# Patient Record
Sex: MALE | Race: WHITE | NOT HISPANIC OR LATINO | Employment: OTHER | URBAN - METROPOLITAN AREA
[De-identification: names, ages, dates, MRNs, and addresses within clinical notes are randomized per-mention and may not be internally consistent; named-entity substitution may affect disease eponyms.]

---

## 2017-03-21 RX ORDER — CARVEDILOL 3.12 MG/1
TABLET ORAL
Qty: 45 TABLET | Refills: 11 | Status: SHIPPED | OUTPATIENT
Start: 2017-03-21 | End: 2018-05-07 | Stop reason: SDUPTHER

## 2017-04-20 RX ORDER — HYDROCORTISONE ACETATE 25 MG/1
SUPPOSITORY RECTAL
Qty: 20 SUPPOSITORY | Refills: 0 | Status: SHIPPED | OUTPATIENT
Start: 2017-04-20 | End: 2017-10-19

## 2017-05-22 RX ORDER — AMLODIPINE BESYLATE 5 MG/1
TABLET ORAL
Qty: 90 TABLET | Refills: 0 | Status: SHIPPED | OUTPATIENT
Start: 2017-05-22 | End: 2017-08-20 | Stop reason: SDUPTHER

## 2017-08-20 DIAGNOSIS — E78.5 HLD (HYPERLIPIDEMIA): ICD-10-CM

## 2017-08-20 RX ORDER — ATORVASTATIN CALCIUM 10 MG/1
TABLET, FILM COATED ORAL
Qty: 45 TABLET | Refills: 3 | Status: SHIPPED | OUTPATIENT
Start: 2017-08-20 | End: 2018-02-28 | Stop reason: SDUPTHER

## 2017-08-22 ENCOUNTER — OFFICE VISIT (OUTPATIENT)
Dept: FAMILY MEDICINE | Facility: CLINIC | Age: 77
End: 2017-08-22
Payer: MEDICARE

## 2017-08-22 VITALS
SYSTOLIC BLOOD PRESSURE: 130 MMHG | RESPIRATION RATE: 18 BRPM | HEIGHT: 69 IN | WEIGHT: 182.75 LBS | TEMPERATURE: 98 F | HEART RATE: 54 BPM | BODY MASS INDEX: 27.07 KG/M2 | DIASTOLIC BLOOD PRESSURE: 72 MMHG | OXYGEN SATURATION: 95 %

## 2017-08-22 DIAGNOSIS — Z12.5 PROSTATE CANCER SCREENING: ICD-10-CM

## 2017-08-22 DIAGNOSIS — Z00.00 PREVENTATIVE HEALTH CARE: ICD-10-CM

## 2017-08-22 DIAGNOSIS — K21.9 GASTROESOPHAGEAL REFLUX DISEASE, ESOPHAGITIS PRESENCE NOT SPECIFIED: ICD-10-CM

## 2017-08-22 DIAGNOSIS — I10 ESSENTIAL HYPERTENSION: Chronic | ICD-10-CM

## 2017-08-22 DIAGNOSIS — E78.5 HYPERLIPIDEMIA, UNSPECIFIED HYPERLIPIDEMIA TYPE: Primary | ICD-10-CM

## 2017-08-22 DIAGNOSIS — I25.810 CORONARY ARTERY DISEASE INVOLVING CORONARY BYPASS GRAFT OF NATIVE HEART WITHOUT ANGINA PECTORIS: Chronic | ICD-10-CM

## 2017-08-22 DIAGNOSIS — S86.811A STRAIN OF CALF MUSCLE, RIGHT, INITIAL ENCOUNTER: ICD-10-CM

## 2017-08-22 PROCEDURE — G0009 ADMIN PNEUMOCOCCAL VACCINE: HCPCS | Mod: PBBFAC,PO

## 2017-08-22 PROCEDURE — 99213 OFFICE O/P EST LOW 20 MIN: CPT | Mod: PBBFAC,PO | Performed by: FAMILY MEDICINE

## 2017-08-22 PROCEDURE — 1159F MED LIST DOCD IN RCRD: CPT | Mod: ,,, | Performed by: FAMILY MEDICINE

## 2017-08-22 PROCEDURE — 3075F SYST BP GE 130 - 139MM HG: CPT | Mod: ,,, | Performed by: FAMILY MEDICINE

## 2017-08-22 PROCEDURE — 99214 OFFICE O/P EST MOD 30 MIN: CPT | Mod: S$PBB,,, | Performed by: FAMILY MEDICINE

## 2017-08-22 PROCEDURE — 90732 PPSV23 VACC 2 YRS+ SUBQ/IM: CPT | Mod: PBBFAC,PO

## 2017-08-22 PROCEDURE — 1126F AMNT PAIN NOTED NONE PRSNT: CPT | Mod: ,,, | Performed by: FAMILY MEDICINE

## 2017-08-22 PROCEDURE — 99999 PR PBB SHADOW E&M-EST. PATIENT-LVL III: CPT | Mod: PBBFAC,,, | Performed by: FAMILY MEDICINE

## 2017-08-22 PROCEDURE — 3078F DIAST BP <80 MM HG: CPT | Mod: ,,, | Performed by: FAMILY MEDICINE

## 2017-08-22 RX ORDER — ATORVASTATIN CALCIUM 10 MG/1
TABLET, FILM COATED ORAL
Qty: 45 TABLET | Refills: 3 | Status: CANCELLED | OUTPATIENT
Start: 2017-08-22

## 2017-08-22 RX ORDER — EZETIMIBE 10 MG/1
10 TABLET ORAL DAILY
Qty: 90 TABLET | Refills: 3 | Status: CANCELLED | OUTPATIENT
Start: 2017-08-22

## 2017-08-22 RX ORDER — AMLODIPINE BESYLATE 5 MG/1
TABLET ORAL
Qty: 90 TABLET | Refills: 4 | Status: SHIPPED | OUTPATIENT
Start: 2017-08-22 | End: 2018-08-27 | Stop reason: SDUPTHER

## 2017-08-22 RX ORDER — AMLODIPINE BESYLATE 5 MG/1
TABLET ORAL
Qty: 90 TABLET | Refills: 4 | Status: CANCELLED | OUTPATIENT
Start: 2017-08-22

## 2017-08-22 NOTE — PROGRESS NOTES
Subjective:       Patient ID: Sean Main is a 76 y.o. male.    Chief Complaint: right leg pain    Here for annual exam.  CAD s/p CABG - following with cardiology  HTN - tolerating amlodipine 5mg daily and Coreg 3.125mg 1/2 BID  HLD - tolerating Zetia 10, Lipitor 10mg 1/2 tablet daily  GERD/hiatal hernia - Protonix 40mg PRN    C/o some right lateral calf pain and spasms for the past 2 months. Stretching has helped some. No noted injury.    Past Medical History:    CAD (coronary artery disease)                                 GERD (gastroesophageal reflux disease)                        Hernia of unspecified site of abdominal cavity*                 Comment:hiatal hernia    Diverticulosis                                                HTN (hypertension)                                            HLD (hyperlipidemia)                                          Past Surgical History:    CORONARY ARTERY BYPASS GRAFT                     4/4/2012        Comment:6 vessels    UPPER GASTROINTESTINAL ENDOSCOPY                 2011            Comment:hiatal hernia    COLONOSCOPY                                      2008            Comment:diverticulosis    CHOLECYSTECTOMY                                                LIVER BIOPSY                                                   Allergies:   -- No Known Drug Allergies    -- Pravastatin     --  myalgias    Social History    Marital Status:                         Social History Main Topics    Smoking Status: Never Smoker                      Smokeless Status: Never Used                        Alcohol Use: No              Drug Use: No                Current Outpatient Prescriptions on File Prior to Visit:  acetaminophen (TYLENOL) 325 MG tablet, Take 325 mg by mouth every 6 (six) hours as needed.  , Disp: , Rfl:   amlodipine (NORVASC) 5 MG tablet, Take 1 tablet (5 mg total) by mouth every evening., Disp: 90 tablet, Rfl: 6  aspirin (ECOTRIN) 81 MG EC tablet, Take 81  mg by mouth once daily., Disp: , Rfl:   atorvastatin (LIPITOR) 10 MG tablet, Take 0.5 tablets (5 mg total) by mouth once daily., Disp: 90 tablet, Rfl: 6  carvedilol (COREG) 3.125 MG tablet, Take 1 tablet (3.125 mg total) by mouth 2 (two) times daily., Disp: 180 tablet, Rfl: 5  ezetimibe (ZETIA) 10 mg tablet, Take 1 tablet (10 mg total) by mouth once daily., Disp: 30 tablet, Rfl: 12  meclizine (ANTIVERT) 25 mg tablet, Take 1 tablet (25 mg total) by mouth 3 (three) times daily as needed., Disp: 30 tablet, Rfl: 5  multivitamin,tx-iron-minerals (THERAPEUTIC MULTIVITAMIN-MINERALS) Tab, Take 1 tablet by mouth once daily., Disp: , Rfl:   niacin 50 MG Tab, Take 100 mg by mouth every evening., Disp: , Rfl:   pantoprazole (PROTONIX) 40 MG tablet, TAKE ONE TABLET BY MOUTH EVERY DAY, Disp: 30 tablet, Rfl: 11  saw palmetto 500 MG capsule, Take 500 mg by mouth 2 (two) times daily., Disp: , Rfl:   hydrocortisone (ANUSOL-HC) 25 mg suppository, Place 1 suppository (25 mg total) rectally 2 (two) times daily., Disp: 20 suppository, Rfl: 5    No current facility-administered medications on file prior to visit.        Review of patient's family history indicates:    Cirrhosis                      Neg Hx                    Liver cancer                   Neg Hx                    Liver disease                  Neg Hx                    Colon cancer                   Neg Hx                        Hypertension   Pertinent negatives include no chest pain, headaches, neck pain, palpitations or shortness of breath.     Review of Systems   Constitutional: Negative for appetite change, fatigue, fever and unexpected weight change.   HENT: Negative for congestion, ear discharge, ear pain, nosebleeds, postnasal drip, rhinorrhea, sinus pressure, sore throat and trouble swallowing.    Eyes: Negative for pain, discharge, redness and visual disturbance.   Respiratory: Negative for apnea, cough, chest tightness, shortness of breath and wheezing.   "  Cardiovascular: Negative for chest pain, palpitations and leg swelling.   Gastrointestinal: Negative for abdominal distention, abdominal pain, blood in stool, constipation, diarrhea, nausea and vomiting.   Genitourinary: Negative for difficulty urinating, dysuria, flank pain, frequency, hematuria, penile swelling (genital pain or swelling), scrotal swelling, testicular pain and urgency.   Musculoskeletal: Negative for back pain, gait problem, joint swelling, myalgias and neck pain.   Skin: Negative for color change, rash and wound.   Neurological: Negative for dizziness, tremors, seizures, syncope, speech difficulty, weakness, light-headedness, numbness and headaches.   Hematological: Negative for adenopathy. Does not bruise/bleed easily.   Psychiatric/Behavioral: Negative for behavioral problems, confusion, hallucinations and suicidal ideas.       Objective:       /72 (BP Location: Left arm, Patient Position: Sitting, BP Method: Medium (Manual))   Pulse (!) 54   Temp 98.3 °F (36.8 °C)   Resp 18   Ht 5' 9" (1.753 m)   Wt 82.9 kg (182 lb 12.2 oz)   SpO2 95%   BMI 26.99 kg/m²     Physical Exam   Constitutional: He is oriented to person, place, and time. He appears well-developed and well-nourished. He is active. No distress.   HENT:   Head: Normocephalic and atraumatic.   Right Ear: External ear normal.   Left Ear: External ear normal.   Mouth/Throat: Uvula is midline, oropharynx is clear and moist and mucous membranes are normal. No oropharyngeal exudate.   Eyes: Conjunctivae, EOM and lids are normal. Pupils are equal, round, and reactive to light.   Neck: Normal range of motion, full passive range of motion without pain and phonation normal. Neck supple. No thyroid mass and no thyromegaly present.   Cardiovascular: Normal rate, regular rhythm, normal heart sounds and intact distal pulses.  Exam reveals no gallop and no friction rub.    No murmur heard.  Pulmonary/Chest: Effort normal and breath sounds " normal. No respiratory distress. He has no wheezes. He has no rales.   Abdominal: Soft. Bowel sounds are normal. He exhibits no distension and no mass. There is no tenderness. There is no rebound and no guarding.   Musculoskeletal: Normal range of motion.        Legs:  Lymphadenopathy:     He has no cervical adenopathy.   Neurological: He is alert and oriented to person, place, and time. No cranial nerve deficit. Coordination normal.   Skin: Skin is warm and dry.   Psychiatric: He has a normal mood and affect. His speech is normal and behavior is normal. Judgment and thought content normal.       Results for orders placed or performed in visit on 08/03/16   Lipid panel   Result Value Ref Range    Cholesterol 164 120 - 199 mg/dL    Triglycerides 201 (H) 30 - 150 mg/dL    HDL 48 40 - 75 mg/dL    LDL Cholesterol 75.8 63.0 - 159.0 mg/dL    HDL/Chol Ratio 29.3 20.0 - 50.0 %    Total Cholesterol/HDL Ratio 3.4 2.0 - 5.0    Non-HDL Cholesterol 116 mg/dL   Comprehensive metabolic panel   Result Value Ref Range    Sodium 139 136 - 145 mmol/L    Potassium 4.8 3.5 - 5.1 mmol/L    Chloride 106 95 - 110 mmol/L    CO2 24 23 - 29 mmol/L    Glucose 106 70 - 110 mg/dL    BUN, Bld 14 8 - 23 mg/dL    Creatinine 1.1 0.5 - 1.4 mg/dL    Calcium 9.8 8.7 - 10.5 mg/dL    Total Protein 7.3 6.0 - 8.4 g/dL    Albumin 4.0 3.5 - 5.2 g/dL    Total Bilirubin 0.9 0.1 - 1.0 mg/dL    Alkaline Phosphatase 94 55 - 135 U/L    AST 33 10 - 40 U/L    ALT 40 10 - 44 U/L    Anion Gap 9 8 - 16 mmol/L    eGFR if African American >60.0 >60 mL/min/1.73 m^2    eGFR if non African American >60.0 >60 mL/min/1.73 m^2   CK   Result Value Ref Range     20 - 200 U/L   TSH   Result Value Ref Range    TSH 1.241 0.400 - 4.000 uIU/mL   Comprehensive metabolic panel   Result Value Ref Range    Sodium 139 136 - 145 mmol/L    Potassium 4.8 3.5 - 5.1 mmol/L    Chloride 106 95 - 110 mmol/L    CO2 24 23 - 29 mmol/L    Glucose 106 70 - 110 mg/dL    BUN, Bld 14 8 - 23 mg/dL     Creatinine 1.1 0.5 - 1.4 mg/dL    Calcium 9.8 8.7 - 10.5 mg/dL    Total Protein 7.3 6.0 - 8.4 g/dL    Albumin 4.0 3.5 - 5.2 g/dL    Total Bilirubin 0.9 0.1 - 1.0 mg/dL    Alkaline Phosphatase 94 55 - 135 U/L    AST 33 10 - 40 U/L    ALT 40 10 - 44 U/L    Anion Gap 9 8 - 16 mmol/L    eGFR if African American >60.0 >60 mL/min/1.73 m^2    eGFR if non African American >60.0 >60 mL/min/1.73 m^2   Lipid panel   Result Value Ref Range    Cholesterol 164 120 - 199 mg/dL    Triglycerides 201 (H) 30 - 150 mg/dL    HDL 48 40 - 75 mg/dL    LDL Cholesterol 75.8 63.0 - 159.0 mg/dL    HDL/Chol Ratio 29.3 20.0 - 50.0 %    Total Cholesterol/HDL Ratio 3.4 2.0 - 5.0    Non-HDL Cholesterol 116 mg/dL   CBC auto differential   Result Value Ref Range    WBC 7.95 3.90 - 12.70 K/uL    RBC 5.46 4.60 - 6.20 M/uL    Hemoglobin 16.2 14.0 - 18.0 g/dL    Hematocrit 48.6 40.0 - 54.0 %    MCV 89 82 - 98 fL    MCH 29.7 27.0 - 31.0 pg    MCHC 33.3 32.0 - 36.0 %    RDW 12.7 11.5 - 14.5 %    Platelets 213 150 - 350 K/uL    MPV 11.2 9.2 - 12.9 fL    Gran # 5.2 1.8 - 7.7 K/uL    Lymph # 2.0 1.0 - 4.8 K/uL    Mono # 0.6 0.3 - 1.0 K/uL    Eos # 0.1 0.0 - 0.5 K/uL    Baso # 0.02 0.00 - 0.20 K/uL    Gran% 65.9 38.0 - 73.0 %    Lymph% 24.9 18.0 - 48.0 %    Mono% 7.4 4.0 - 15.0 %    Eosinophil% 1.4 0.0 - 8.0 %    Basophil% 0.3 0.0 - 1.9 %    Differential Method Automated      Assessment:       1. Hyperlipidemia, unspecified hyperlipidemia type    2. Prostate cancer screening    3. Preventative health care    4. Strain of calf muscle, right, initial encounter    5. Gastroesophageal reflux disease, esophagitis presence not specified    6. Essential hypertension    7. Coronary artery disease involving coronary bypass graft of native heart without angina pectoris        Plan:       Hyperlipidemia, unspecified hyperlipidemia type    Prostate cancer screening  -     PSA, Screening; Future; Expected date: 08/22/2017    Preventative health care  -     (In Office  Administered) Pneumococcal Polysaccharide Vaccine (23 Valent) (SQ/IM)    Strain of calf muscle, right, initial encounter    Gastroesophageal reflux disease, esophagitis presence not specified    Essential hypertension    Coronary artery disease involving coronary bypass graft of native heart without angina pectoris    Other orders  -     Cancel: ezetimibe (ZETIA) 10 mg tablet; Take 1 tablet (10 mg total) by mouth once daily.  Dispense: 90 tablet; Refill: 3  -     Cancel: amlodipine (NORVASC) 5 MG tablet;   Dispense: 90 tablet; Refill: 4  -     Cancel: atorvastatin (LIPITOR) 10 MG tablet;   Dispense: 45 tablet; Refill: 3          Eccentric right calf stretching discussed  Add PSA to upcoming cardiology labs  Counseled on regular exercise, maintenance of a healthy weight, balanced diet rich in fruits/vegetables and lean protein, and avoidance of unhealthy habits like smoking and excessive alcohol intake.  Continue present meds  F/u 1 year or as needed

## 2017-08-28 RX ORDER — EZETIMIBE 10 MG/1
TABLET ORAL
Qty: 30 TABLET | Refills: 11 | Status: SHIPPED | OUTPATIENT
Start: 2017-08-28 | End: 2018-09-03 | Stop reason: SDUPTHER

## 2017-09-28 ENCOUNTER — LAB VISIT (OUTPATIENT)
Dept: LAB | Facility: HOSPITAL | Age: 77
End: 2017-09-28
Attending: INTERNAL MEDICINE
Payer: MEDICARE

## 2017-09-28 DIAGNOSIS — E78.5 DYSLIPIDEMIA: Chronic | ICD-10-CM

## 2017-09-28 DIAGNOSIS — I10 ESSENTIAL HYPERTENSION: Chronic | ICD-10-CM

## 2017-09-28 DIAGNOSIS — Z95.1 S/P CABG (CORONARY ARTERY BYPASS GRAFT): ICD-10-CM

## 2017-09-28 DIAGNOSIS — Z12.5 PROSTATE CANCER SCREENING: ICD-10-CM

## 2017-09-28 DIAGNOSIS — R00.1 BRADYCARDIA: Chronic | ICD-10-CM

## 2017-09-28 DIAGNOSIS — I25.810 CORONARY ARTERY DISEASE INVOLVING CORONARY BYPASS GRAFT OF NATIVE HEART WITHOUT ANGINA PECTORIS: Chronic | ICD-10-CM

## 2017-09-28 DIAGNOSIS — R55 SYNCOPE AND COLLAPSE: ICD-10-CM

## 2017-09-28 DIAGNOSIS — I49.3 PVC (PREMATURE VENTRICULAR CONTRACTION): ICD-10-CM

## 2017-09-28 LAB
ALBUMIN SERPL BCP-MCNC: 3.9 G/DL
ALP SERPL-CCNC: 84 U/L
ALT SERPL W/O P-5'-P-CCNC: 33 U/L
ANION GAP SERPL CALC-SCNC: 7 MMOL/L
AST SERPL-CCNC: 34 U/L
BASOPHILS # BLD AUTO: 0.01 K/UL
BASOPHILS NFR BLD: 0.2 %
BILIRUB SERPL-MCNC: 0.7 MG/DL
BUN SERPL-MCNC: 17 MG/DL
CALCIUM SERPL-MCNC: 9.8 MG/DL
CHLORIDE SERPL-SCNC: 108 MMOL/L
CHOLEST SERPL-MCNC: 151 MG/DL
CHOLEST/HDLC SERPL: 3.3 {RATIO}
CO2 SERPL-SCNC: 29 MMOL/L
COMPLEXED PSA SERPL-MCNC: 1.4 NG/ML
CREAT SERPL-MCNC: 1.1 MG/DL
DIFFERENTIAL METHOD: NORMAL
EOSINOPHIL # BLD AUTO: 0.1 K/UL
EOSINOPHIL NFR BLD: 1.6 %
ERYTHROCYTE [DISTWIDTH] IN BLOOD BY AUTOMATED COUNT: 13 %
EST. GFR  (AFRICAN AMERICAN): >60 ML/MIN/1.73 M^2
EST. GFR  (NON AFRICAN AMERICAN): >60 ML/MIN/1.73 M^2
GLUCOSE SERPL-MCNC: 108 MG/DL
HCT VFR BLD AUTO: 49.1 %
HDLC SERPL-MCNC: 46 MG/DL
HDLC SERPL: 30.5 %
HGB BLD-MCNC: 16.3 G/DL
LDLC SERPL CALC-MCNC: 75 MG/DL
LYMPHOCYTES # BLD AUTO: 2 K/UL
LYMPHOCYTES NFR BLD: 35.7 %
MCH RBC QN AUTO: 30.1 PG
MCHC RBC AUTO-ENTMCNC: 33.2 G/DL
MCV RBC AUTO: 91 FL
MONOCYTES # BLD AUTO: 0.5 K/UL
MONOCYTES NFR BLD: 8.1 %
NEUTROPHILS # BLD AUTO: 3.1 K/UL
NEUTROPHILS NFR BLD: 54.2 %
NONHDLC SERPL-MCNC: 105 MG/DL
PLATELET # BLD AUTO: 166 K/UL
PMV BLD AUTO: 12.1 FL
POTASSIUM SERPL-SCNC: 5.5 MMOL/L
PROT SERPL-MCNC: 7.5 G/DL
RBC # BLD AUTO: 5.42 M/UL
SODIUM SERPL-SCNC: 144 MMOL/L
TRIGL SERPL-MCNC: 150 MG/DL
WBC # BLD AUTO: 5.68 K/UL

## 2017-09-28 PROCEDURE — 84153 ASSAY OF PSA TOTAL: CPT

## 2017-09-28 PROCEDURE — 80061 LIPID PANEL: CPT

## 2017-09-28 PROCEDURE — 80053 COMPREHEN METABOLIC PANEL: CPT

## 2017-09-28 PROCEDURE — 85025 COMPLETE CBC W/AUTO DIFF WBC: CPT

## 2017-09-28 PROCEDURE — 36415 COLL VENOUS BLD VENIPUNCTURE: CPT | Mod: PO

## 2017-09-29 ENCOUNTER — TELEPHONE (OUTPATIENT)
Dept: CARDIOLOGY | Facility: CLINIC | Age: 77
End: 2017-09-29

## 2017-09-29 DIAGNOSIS — I15.9 SECONDARY HYPERTENSION: ICD-10-CM

## 2017-09-29 DIAGNOSIS — I25.10 CORONARY ARTERY DISEASE, ANGINA PRESENCE UNSPECIFIED, UNSPECIFIED VESSEL OR LESION TYPE, UNSPECIFIED WHETHER NATIVE OR TRANSPLANTED HEART: Primary | ICD-10-CM

## 2017-09-29 NOTE — TELEPHONE ENCOUNTER
----- Message from Gregory Meyers MD sent at 9/28/2017  8:01 PM CDT -----  Please call the patient regarding his abnormal result. All labs are better except K repeat it next tues. No bananas

## 2017-09-29 NOTE — TELEPHONE ENCOUNTER
Spoke to patient, informed him of lab results and directions as per Dr. Meyers (abnormal labs, all labs are better except K repeat it next tues. No bananas). Schedule cmp labs for next Tuesday, pt. Accepted appointment and verbalized understanding.

## 2017-10-03 ENCOUNTER — LAB VISIT (OUTPATIENT)
Dept: LAB | Facility: HOSPITAL | Age: 77
End: 2017-10-03
Attending: INTERNAL MEDICINE
Payer: MEDICARE

## 2017-10-03 DIAGNOSIS — I15.9 SECONDARY HYPERTENSION: ICD-10-CM

## 2017-10-03 DIAGNOSIS — I25.10 CORONARY ARTERY DISEASE, ANGINA PRESENCE UNSPECIFIED, UNSPECIFIED VESSEL OR LESION TYPE, UNSPECIFIED WHETHER NATIVE OR TRANSPLANTED HEART: ICD-10-CM

## 2017-10-03 LAB
ALBUMIN SERPL BCP-MCNC: 4 G/DL
ALP SERPL-CCNC: 81 U/L
ALT SERPL W/O P-5'-P-CCNC: 30 U/L
ANION GAP SERPL CALC-SCNC: 7 MMOL/L
AST SERPL-CCNC: 30 U/L
BILIRUB SERPL-MCNC: 0.9 MG/DL
BUN SERPL-MCNC: 16 MG/DL
CALCIUM SERPL-MCNC: 9.7 MG/DL
CHLORIDE SERPL-SCNC: 107 MMOL/L
CO2 SERPL-SCNC: 27 MMOL/L
CREAT SERPL-MCNC: 1 MG/DL
EST. GFR  (AFRICAN AMERICAN): >60 ML/MIN/1.73 M^2
EST. GFR  (NON AFRICAN AMERICAN): >60 ML/MIN/1.73 M^2
GLUCOSE SERPL-MCNC: 101 MG/DL
POTASSIUM SERPL-SCNC: 4.9 MMOL/L
PROT SERPL-MCNC: 7.5 G/DL
SODIUM SERPL-SCNC: 141 MMOL/L

## 2017-10-03 PROCEDURE — 80053 COMPREHEN METABOLIC PANEL: CPT

## 2017-10-03 PROCEDURE — 36415 COLL VENOUS BLD VENIPUNCTURE: CPT | Mod: PO

## 2017-10-19 ENCOUNTER — TELEPHONE (OUTPATIENT)
Dept: PODIATRY | Facility: CLINIC | Age: 77
End: 2017-10-19

## 2017-10-19 ENCOUNTER — TELEPHONE (OUTPATIENT)
Dept: CARDIOLOGY | Facility: CLINIC | Age: 77
End: 2017-10-19

## 2017-10-19 ENCOUNTER — HOSPITAL ENCOUNTER (OUTPATIENT)
Dept: RADIOLOGY | Facility: HOSPITAL | Age: 77
Discharge: HOME | End: 2017-10-19
Attending: INTERNAL MEDICINE
Payer: MEDICARE

## 2017-10-19 ENCOUNTER — OFFICE VISIT (OUTPATIENT)
Dept: CARDIOLOGY | Facility: CLINIC | Age: 77
End: 2017-10-19
Payer: MEDICARE

## 2017-10-19 VITALS
SYSTOLIC BLOOD PRESSURE: 139 MMHG | DIASTOLIC BLOOD PRESSURE: 69 MMHG | HEIGHT: 69 IN | BODY MASS INDEX: 27.39 KG/M2 | HEART RATE: 58 BPM | WEIGHT: 184.94 LBS

## 2017-10-19 DIAGNOSIS — Z95.1 S/P CABG (CORONARY ARTERY BYPASS GRAFT): Primary | ICD-10-CM

## 2017-10-19 DIAGNOSIS — I10 ESSENTIAL HYPERTENSION: Chronic | ICD-10-CM

## 2017-10-19 DIAGNOSIS — Z95.1 S/P CABG (CORONARY ARTERY BYPASS GRAFT): ICD-10-CM

## 2017-10-19 DIAGNOSIS — I25.10 CORONARY ARTERY DISEASE INVOLVING NATIVE CORONARY ARTERY OF NATIVE HEART WITHOUT ANGINA PECTORIS: Chronic | ICD-10-CM

## 2017-10-19 DIAGNOSIS — I49.3 PVC (PREMATURE VENTRICULAR CONTRACTION): ICD-10-CM

## 2017-10-19 DIAGNOSIS — R55 SYNCOPE AND COLLAPSE: ICD-10-CM

## 2017-10-19 DIAGNOSIS — R00.1 BRADYCARDIA: Chronic | ICD-10-CM

## 2017-10-19 DIAGNOSIS — E78.5 DYSLIPIDEMIA: Chronic | ICD-10-CM

## 2017-10-19 PROCEDURE — 99999 PR PBB SHADOW E&M-EST. PATIENT-LVL IV: CPT | Mod: PBBFAC,,, | Performed by: INTERNAL MEDICINE

## 2017-10-19 PROCEDURE — 99214 OFFICE O/P EST MOD 30 MIN: CPT | Mod: S$PBB,,, | Performed by: INTERNAL MEDICINE

## 2017-10-19 PROCEDURE — 73630 X-RAY EXAM OF FOOT: CPT | Mod: TC,PO,LT

## 2017-10-19 PROCEDURE — 99214 OFFICE O/P EST MOD 30 MIN: CPT | Mod: PBBFAC,PO,25 | Performed by: INTERNAL MEDICINE

## 2017-10-19 PROCEDURE — 73630 X-RAY EXAM OF FOOT: CPT | Mod: 26,LT,, | Performed by: RADIOLOGY

## 2017-10-19 NOTE — PROGRESS NOTES
Subjective:    Patient ID:  Sean Main is a 76 y.o. male who presents for follow-up of Coronary Artery Disease (1 yr f/u-labs)      HPI  Here for f/u CABG/bradycardia/syncope. Patients states is doing well no chest pain, SOB or change in exertional tolerence.   Patient is exercising regularly with out symptoms.  Patient denies palpitations, syncope, presyncope, lightheadedness or dizziness.      Review of Systems   Constitution: Negative for malaise/fatigue.   Eyes: Negative for blurred vision.   Cardiovascular: Negative for chest pain, claudication, cyanosis, dyspnea on exertion, irregular heartbeat, leg swelling, near-syncope, orthopnea, palpitations, paroxysmal nocturnal dyspnea and syncope.   Respiratory: Negative for cough and shortness of breath.    Hematologic/Lymphatic: Does not bruise/bleed easily.   Musculoskeletal: Negative for back pain, falls, joint pain, muscle cramps, muscle weakness and myalgias.   Gastrointestinal: Negative for abdominal pain, change in bowel habit, nausea and vomiting.   Genitourinary: Negative for urgency.   Neurological: Negative for dizziness, focal weakness and light-headedness.        Objective:    Physical Exam   Constitutional: He is oriented to person, place, and time. He appears well-developed and well-nourished. He is cooperative.   HENT:   Head: Normocephalic and atraumatic.   Eyes: Conjunctivae are normal. Right eye exhibits no exudate. Left eye exhibits no exudate.   Neck: Normal range of motion. Neck supple. Normal carotid pulses and no JVD present. Carotid bruit is not present. No thyromegaly present.   Cardiovascular: Normal rate, regular rhythm, normal heart sounds and intact distal pulses.    Pulses:       Carotid pulses are 2+ on the right side, and 2+ on the left side.       Radial pulses are 2+ on the right side, and 2+ on the left side.        Dorsalis pedis pulses are 2+ on the right side, and 2+ on the left side.        Posterior tibial pulses are 2+  on the right side, and 2+ on the left side.   Well healed midline sternal incision.      Pulmonary/Chest: Effort normal and breath sounds normal.   Abdominal: Soft. Bowel sounds are normal.   Musculoskeletal: Normal range of motion. He exhibits no edema.   Neurological: He is alert and oriented to person, place, and time. Gait normal.   Skin: Skin is warm, dry and intact. No cyanosis. Nails show no clubbing.   Psychiatric: He has a normal mood and affect. His speech is normal and behavior is normal. Judgment and thought content normal. Cognition and memory are normal.             ..    Chemistry        Component Value Date/Time     10/03/2017 0834    K 4.9 10/03/2017 0834     10/03/2017 0834    CO2 27 10/03/2017 0834    BUN 16 10/03/2017 0834    CREATININE 1.0 10/03/2017 0834     10/03/2017 0834        Component Value Date/Time    CALCIUM 9.7 10/03/2017 0834    ALKPHOS 81 10/03/2017 0834    AST 30 10/03/2017 0834    ALT 30 10/03/2017 0834    BILITOT 0.9 10/03/2017 0834    ESTGFRAFRICA >60.0 10/03/2017 0834    EGFRNONAA >60.0 10/03/2017 0834            ..  Lab Results   Component Value Date    CHOL 151 09/28/2017    CHOL 164 08/03/2016    CHOL 164 08/03/2016     Lab Results   Component Value Date    HDL 46 09/28/2017    HDL 48 08/03/2016    HDL 48 08/03/2016     Lab Results   Component Value Date    LDLCALC 75.0 09/28/2017    LDLCALC 75.8 08/03/2016    LDLCALC 75.8 08/03/2016     Lab Results   Component Value Date    TRIG 150 09/28/2017    TRIG 201 (H) 08/03/2016    TRIG 201 (H) 08/03/2016     Lab Results   Component Value Date    CHOLHDL 30.5 09/28/2017    CHOLHDL 29.3 08/03/2016    CHOLHDL 29.3 08/03/2016     ..  Lab Results   Component Value Date    WBC 5.68 09/28/2017    HGB 16.3 09/28/2017    HCT 49.1 09/28/2017    MCV 91 09/28/2017     09/28/2017       Test(s) Reviewed  I have reviewed the following in detail:  [] Stress test   [] Angiography   [x] Echocardiogram   [x] Labs   [] Other:        Assessment:         ICD-10-CM ICD-9-CM   1. S/P CABG (coronary artery bypass graft) Z95.1 V45.81   2. PVC (premature ventricular contraction) I49.3 427.69   3. Bradycardia R00.1 427.89   4. Essential hypertension I10 401.9   5. Dyslipidemia E78.5 272.4   6. Coronary artery disease involving native coronary artery of native heart without angina pectoris I25.10 414.01   7. Syncope and collapse R55 780.2     Problem List Items Addressed This Visit     Bradycardia (Chronic)    CAD (coronary artery disease) (Chronic)    Dyslipidemia (Chronic)    Essential hypertension (Chronic)    PVC (premature ventricular contraction)    Overview     6/2015 [id number] PVC         S/P CABG (coronary artery bypass graft) - Primary    Overview     4/12 CABG x6 L-LAD, VG- D2, D3, VG OM1, VG- PDA/PLB         Syncope and collapse      Other Visit Diagnoses    None.          Plan:           Return to clinic 1 year   Low level/low impact aerobic exercise 5x's/wk. Heart healthy diet and risk factor modification.    See labs and med orders.  Refer to podiatry for left heel pain  ILR if has sx's or syncope

## 2017-10-20 ENCOUNTER — OFFICE VISIT (OUTPATIENT)
Dept: PODIATRY | Facility: CLINIC | Age: 77
End: 2017-10-20
Payer: MEDICARE

## 2017-10-20 VITALS — WEIGHT: 184.5 LBS | HEIGHT: 69 IN | BODY MASS INDEX: 27.33 KG/M2

## 2017-10-20 DIAGNOSIS — M21.6X9 EQUINUS DEFORMITY OF FOOT: ICD-10-CM

## 2017-10-20 DIAGNOSIS — M72.2 PLANTAR FASCIITIS: Primary | ICD-10-CM

## 2017-10-20 PROCEDURE — 99999 PR PBB SHADOW E&M-EST. PATIENT-LVL III: CPT | Mod: PBBFAC,,, | Performed by: PODIATRIST

## 2017-10-20 PROCEDURE — 99213 OFFICE O/P EST LOW 20 MIN: CPT | Mod: PBBFAC,PO | Performed by: PODIATRIST

## 2017-10-20 PROCEDURE — 99204 OFFICE O/P NEW MOD 45 MIN: CPT | Mod: S$PBB,,, | Performed by: PODIATRIST

## 2017-10-20 RX ORDER — METHYLPREDNISOLONE 4 MG/1
TABLET ORAL
Qty: 1 PACKAGE | Refills: 0 | Status: SHIPPED | OUTPATIENT
Start: 2017-10-20 | End: 2017-11-10

## 2017-10-20 NOTE — PATIENT INSTRUCTIONS
- Perform stretching exercises, see handout for details, to address tightness of calf muscles.      - Recommend wearing supportive shoes or orthopedic sandals only.  Avoid barefoot walking, flip flops, and Crocs, as this will exacerbate current symptoms.      - Recommend wearing a pair of OTC insoles.  Given both verbal and written information regarding this.    - Recommend icing the affected heel a minimum of 20 minutes daily.    - Recommend taking either Tylenol or Ibuprofen to address pain.     - Avoid high impact activities such as squatting, stooping, and running as these activities will exacerbate symptoms.      - May consider applying a topical analgesic (Biofreeze) to help with pain symptoms.

## 2017-10-20 NOTE — PROGRESS NOTES
"Subjective:      Patient ID: Sean Main is a 76 y.o. male.    Chief Complaint: Heel Pain (left)  Patient presents to clinic with the chief complaint of Lt. Heel pain with an onset x 2 months.  Relates previously seeing his PCP for spasms of the Rt. Calf.  Was instructed to begin performing stretching exercises, and states that one particular exercise seemed to ignite his current issue.  States symptoms are exacerbated with the first steps of the morning and are alleviated with rest.  Describes pain similar to "walking on a stone".  Currently rates pain as a 9/10.  Has not attempted to self treat nor has he experienced trauma to the involved foot.  States that his feet usually feel better while wearing his [employer] shoes.  Denies any additional pedal complaints.  Past Medical History:   Diagnosis Date    CAD (coronary artery disease)     DDD (degenerative disc disease), lumbar     Diverticulosis     GERD (gastroesophageal reflux disease)     Hemorrhoid     Hernia of unspecified site of abdominal cavity without mention of obstruction or gangrene     hiatal hernia    HLD (hyperlipidemia)     HTN (hypertension)        Past Surgical History:   Procedure Laterality Date    BASAL CELL CARCINOMA EXCISION  08/01/2016    left nasal    CHOLECYSTECTOMY      COLONOSCOPY  2008    diverticulosis    CORONARY ARTERY BYPASS GRAFT  4/4/2012    6 vessels    LIVER BIOPSY      UPPER GASTROINTESTINAL ENDOSCOPY  2011    hiatal hernia       Family History   Problem Relation Age of Onset    Cirrhosis Neg Hx     Liver cancer Neg Hx     Liver disease Neg Hx     Colon cancer Neg Hx        Social History     Social History    Marital status:      Spouse name: N/A    Number of children: N/A    Years of education: N/A     Social History Main Topics    Smoking status: Never Smoker    Smokeless tobacco: Never Used    Alcohol use No    Drug use: No    Sexual activity: Not Asked     Other Topics Concern    " None     Social History Narrative    None       Current Outpatient Prescriptions   Medication Sig Dispense Refill    amlodipine (NORVASC) 5 MG tablet TAKE ONE TABLET BY MOUTH ONCE DAILY IN THE EVENING 90 tablet 4    aspirin (ECOTRIN) 81 MG EC tablet Take 81 mg by mouth once daily.      atorvastatin (LIPITOR) 10 MG tablet TAKE ONE-HALF TABLET BY MOUTH ONCE DAILY 45 tablet 3    carvedilol (COREG) 3.125 MG tablet TAKE ONE-HALF TABLET BY MOUTH TWICE DAILY WITH MEALS 45 tablet 11    ezetimibe (ZETIA) 10 mg tablet Take one tablets by mouth once daily 30 tablet 11    multivitamin,tx-iron-minerals (THERAPEUTIC MULTIVITAMIN-MINERALS) Tab Take 1 tablet by mouth once daily.      niacin 50 MG Tab Take 100 mg by mouth every evening.      pantoprazole (PROTONIX) 40 MG tablet TAKE ONE TABLET BY MOUTH ONCE DAILY 30 tablet 11    saw palmetto 500 MG capsule Take 500 mg by mouth 2 (two) times daily.      acetaminophen (TYLENOL) 325 MG tablet Take 325 mg by mouth every 6 (six) hours as needed.        amoxicillin (AMOXIL) 500 MG capsule       hydrocodone-acetaminophen 5-325mg (NORCO) 5-325 mg per tablet Take 1 tablet by mouth every 6 (six) hours as needed for Pain.      hydrocodone-acetaminophen 7.5-325mg (NORCO) 7.5-325 mg per tablet       methylPREDNISolone (MEDROL DOSEPACK) 4 mg tablet use as directed 1 Package 0     No current facility-administered medications for this visit.        Review of patient's allergies indicates:   Allergen Reactions    Lipitor [atorvastatin]      Myalgia      Pravastatin      myalgias         Review of Systems   Constitution: Negative for chills and fever.   Cardiovascular: Negative for claudication and leg swelling.   Skin: Negative for color change, dry skin and nail changes.   Musculoskeletal: Positive for arthritis and myalgias. Negative for falls, joint swelling and muscle weakness.   Neurological: Negative for numbness and paresthesias.   Psychiatric/Behavioral: Negative for altered  mental status.           Objective:      Physical Exam   Constitutional: He is oriented to person, place, and time. He appears well-developed and well-nourished. No distress.   Cardiovascular:   Pulses:       Dorsalis pedis pulses are 2+ on the right side, and 2+ on the left side.        Posterior tibial pulses are 2+ on the right side, and 2+ on the left side.   CFT <3 seconds bilateral.  Pedal hair growth present bilateral.  Varicosities noted bilateral.  No bilateral lower extremity edema.  Toes are warm to touch bilateral.     Musculoskeletal: He exhibits tenderness. He exhibits no edema.   Muscle strength 5/5 in all muscle groups bilateral.  No tenderness nor crepitation with ROM of foot/ankle joints bilateral.  Pain with palpation of the Lt. Medial calcaneal tubercle.  (-) heel squeeze test on the Lt.  Bilateral pes planus foot type.  Bilateral gastrocnemius equinus.  Bilateral hallux abducto valgus.  Bilateral semi-reducible contracture of toes 2-5.   Neurological: He is alert and oriented to person, place, and time. He has normal strength. No sensory deficit.   Light touch intact bilateral.     Skin: Skin is warm, dry and intact. Capillary refill takes less than 2 seconds. No abrasion, no bruising, no burn, no ecchymosis, no laceration, no lesion, no petechiae and no rash noted. He is not diaphoretic. No erythema. No pallor.   Pedal skin has normal turgor, temperature, and texture bilateral.  Mycotic changes to several of the toenails.  Examination of the skin reveals no evidence of significant maceration, rashes, open lesions, suspicious appearing nevi or other concerning lesions.              Assessment:       Encounter Diagnoses   Name Primary?    Plantar fasciitis Yes    Equinus deformity of foot          Plan:       Sean was seen today for heel pain.    Diagnoses and all orders for this visit:    Plantar fasciitis  -     methylPREDNISolone (MEDROL DOSEPACK) 4 mg tablet; use as directed    Equinus  deformity of foot      I counseled the patient on his conditions, their implications and medical management.    - Advised to perform stretching exercises daily to address equinus.    - Recommend wearing supportive shoes or orthopedic sandals only.  Discussed avoidance of barefoot walking, flip flops, and Crocs, as this will exacerbate current symptoms.      - Recommend wearing a pair of OTC insoles.  Given both verbal and written information regarding this.    - Recommend icing the affected heel a minimum of 20 minutes daily.    - Discussed avoidance of high impact activities such as squatting, stooping, and running as these activities will exacerbate symptoms.      - May consider applying a topical analgesic (Aspercream or Salonpas) to help with pain symptoms.     - RTC prn or sooner if symptoms fail to resolve.    Ollie Amin DPM

## 2017-11-19 DIAGNOSIS — K21.9 GASTROESOPHAGEAL REFLUX DISEASE, ESOPHAGITIS PRESENCE NOT SPECIFIED: ICD-10-CM

## 2017-11-19 RX ORDER — PANTOPRAZOLE SODIUM 40 MG/1
TABLET, DELAYED RELEASE ORAL
Qty: 30 TABLET | Refills: 11 | Status: SHIPPED | OUTPATIENT
Start: 2017-11-19 | End: 2018-12-11 | Stop reason: SDUPTHER

## 2018-02-25 DIAGNOSIS — E78.5 HLD (HYPERLIPIDEMIA): ICD-10-CM

## 2018-02-26 RX ORDER — ATORVASTATIN CALCIUM 20 MG/1
TABLET, FILM COATED ORAL
Qty: 30 TABLET | Refills: 11 | Status: SHIPPED | OUTPATIENT
Start: 2018-02-26 | End: 2018-02-28

## 2018-02-27 ENCOUNTER — TELEPHONE (OUTPATIENT)
Dept: CARDIOLOGY | Facility: CLINIC | Age: 78
End: 2018-02-27

## 2018-02-27 NOTE — TELEPHONE ENCOUNTER
----- Message from Adelita Grady sent at 2/27/2018  1:51 PM CST -----  Contact patient concerning correct dosage of medication Atorvastatin, at [phone].    Thank you

## 2018-02-27 NOTE — TELEPHONE ENCOUNTER
Spoke to patient, informed him to contact PCP office to get directions for Lipitor change. Patient verbalized.

## 2018-02-28 ENCOUNTER — TELEPHONE (OUTPATIENT)
Dept: FAMILY MEDICINE | Facility: CLINIC | Age: 78
End: 2018-02-28

## 2018-02-28 DIAGNOSIS — E78.5 HYPERLIPIDEMIA, UNSPECIFIED HYPERLIPIDEMIA TYPE: ICD-10-CM

## 2018-02-28 RX ORDER — ATORVASTATIN CALCIUM 10 MG/1
TABLET, FILM COATED ORAL
Qty: 45 TABLET | Refills: 3 | Status: SHIPPED | OUTPATIENT
Start: 2018-02-28 | End: 2019-03-25 | Stop reason: SDUPTHER

## 2018-02-28 NOTE — TELEPHONE ENCOUNTER
Patient had renewed prescription of lipitor and the sig has changed.     atorvastatin (LIPITOR) 10 MG tablet 45 tablet 3 8/20/2017  No   Sig: TAKE ONE-HALF TABLET BY MOUTH ONCE DAILY     atorvastatin (LIPITOR) 20 MG tablet 30 tablet 11 2/26/2018  No   Sig: TAKE ONE TABLET BY MOUTH ONCE DAILY

## 2018-02-28 NOTE — TELEPHONE ENCOUNTER
----- Message from Edie Fisher sent at 2/28/2018  8:00 AM CST -----  Contact: Patient  Sean, patient [phone] calling for clarification on atorvastatin (LIPITOR) 20 MG tablet, patient states he just renewed the prescription and the directions before told him to take half a tablet, and the new directions say take one tablet. Please advise. Thanks.

## 2018-02-28 NOTE — TELEPHONE ENCOUNTER
It should be Lipior 10mg 1/2 tablet daily. I have sent in the correct Rx.  Whoever sent it to me for refill likely selected an old prescription that was errantly filled.

## 2018-05-07 RX ORDER — CARVEDILOL 3.12 MG/1
TABLET ORAL
Qty: 45 TABLET | Refills: 11 | Status: SHIPPED | OUTPATIENT
Start: 2018-05-07 | End: 2019-06-22 | Stop reason: SDUPTHER

## 2018-06-22 ENCOUNTER — TELEPHONE (OUTPATIENT)
Dept: VASCULAR SURGERY | Facility: CLINIC | Age: 78
End: 2018-06-22

## 2018-06-22 NOTE — TELEPHONE ENCOUNTER
----- Message from Fracisco Kelley sent at 6/22/2018 10:52 AM CDT -----  Contact: Sean Main            Name of Who is Calling: Sean Main      What is the request in detail: Patient has an infection in his tooth and wants to know if this will affect his upcoming surgery      Can the clinic reply by [facility]: No      What Number to Call Back if not in [facility]: [phone]

## 2018-07-16 ENCOUNTER — HOSPITAL ENCOUNTER (OUTPATIENT)
Dept: RADIOLOGY | Facility: HOSPITAL | Age: 78
Discharge: HOME | End: 2018-07-16
Attending: NURSE PRACTITIONER
Payer: MEDICARE

## 2018-07-16 ENCOUNTER — TELEPHONE (OUTPATIENT)
Dept: FAMILY MEDICINE | Facility: CLINIC | Age: 78
End: 2018-07-16

## 2018-07-16 ENCOUNTER — OFFICE VISIT (OUTPATIENT)
Dept: FAMILY MEDICINE | Facility: CLINIC | Age: 78
End: 2018-07-16
Payer: MEDICARE

## 2018-07-16 VITALS
HEIGHT: 69 IN | SYSTOLIC BLOOD PRESSURE: 118 MMHG | HEART RATE: 50 BPM | DIASTOLIC BLOOD PRESSURE: 78 MMHG | BODY MASS INDEX: 27.04 KG/M2 | OXYGEN SATURATION: 97 % | TEMPERATURE: 99 F | RESPIRATION RATE: 18 BRPM | WEIGHT: 182.56 LBS

## 2018-07-16 DIAGNOSIS — G89.29 CHRONIC BILATERAL LOW BACK PAIN WITHOUT SCIATICA: ICD-10-CM

## 2018-07-16 DIAGNOSIS — I25.10 CORONARY ARTERY DISEASE INVOLVING NATIVE CORONARY ARTERY OF NATIVE HEART WITHOUT ANGINA PECTORIS: Chronic | ICD-10-CM

## 2018-07-16 DIAGNOSIS — M79.10 MYALGIA: ICD-10-CM

## 2018-07-16 DIAGNOSIS — Z87.81 HISTORY OF COMPRESSION FRACTURE OF SPINE: ICD-10-CM

## 2018-07-16 DIAGNOSIS — I10 ESSENTIAL HYPERTENSION: Chronic | ICD-10-CM

## 2018-07-16 DIAGNOSIS — G89.29 CHRONIC BILATERAL LOW BACK PAIN WITHOUT SCIATICA: Primary | ICD-10-CM

## 2018-07-16 DIAGNOSIS — M54.50 CHRONIC BILATERAL LOW BACK PAIN WITHOUT SCIATICA: ICD-10-CM

## 2018-07-16 DIAGNOSIS — M85.80 OSTEOPENIA, UNSPECIFIED LOCATION: Primary | ICD-10-CM

## 2018-07-16 DIAGNOSIS — M89.9 DISORDER OF BONE: ICD-10-CM

## 2018-07-16 DIAGNOSIS — M54.50 CHRONIC BILATERAL LOW BACK PAIN WITHOUT SCIATICA: Primary | ICD-10-CM

## 2018-07-16 PROCEDURE — 72110 X-RAY EXAM L-2 SPINE 4/>VWS: CPT | Mod: TC,FY,PO

## 2018-07-16 PROCEDURE — 99215 OFFICE O/P EST HI 40 MIN: CPT | Mod: PBBFAC,25,PO | Performed by: NURSE PRACTITIONER

## 2018-07-16 PROCEDURE — 72070 X-RAY EXAM THORAC SPINE 2VWS: CPT | Mod: 26,,, | Performed by: RADIOLOGY

## 2018-07-16 PROCEDURE — 99999 PR PBB SHADOW E&M-EST. PATIENT-LVL V: CPT | Mod: PBBFAC,,, | Performed by: NURSE PRACTITIONER

## 2018-07-16 PROCEDURE — 72110 X-RAY EXAM L-2 SPINE 4/>VWS: CPT | Mod: 26,,, | Performed by: RADIOLOGY

## 2018-07-16 PROCEDURE — 72070 X-RAY EXAM THORAC SPINE 2VWS: CPT | Mod: TC,FY,PO

## 2018-07-16 PROCEDURE — 99214 OFFICE O/P EST MOD 30 MIN: CPT | Mod: S$PBB,,, | Performed by: NURSE PRACTITIONER

## 2018-07-16 NOTE — TELEPHONE ENCOUNTER
Please call patient and tell him that he does have osteopenia in his bones in his back and this showed a fracture of one of his vertebraes in the past - Nothing acute right now but he needs bone scan. 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

## 2018-07-16 NOTE — PROGRESS NOTES
Subjective:       Patient ID: Sean Main is a 77 y.o. male.    Chief Complaint: Back Pain (getting worse)    Here today with chronic back pain - has not seen anyone with this- he feels that it has gotten worse and has bothered him more lately   This is the first time I am seeing him in clinic       Back Pain   This is a new problem. The current episode started more than 1 month ago (says back pain since he was 19 - was rolling wheelbarrow - and saw chiropracotr ). The problem occurs daily. The problem has been gradually worsening since onset. The pain is present in the lumbar spine. The pain does not radiate. The pain is at a severity of 6/10. The symptoms are aggravated by bending and position. Stiffness is present all day. Pertinent negatives include no abdominal pain, bladder incontinence, bowel incontinence, chest pain, dysuria, fever, headaches, leg pain, numbness, paresis, paresthesias, pelvic pain, perianal numbness, tingling, weakness or weight loss. Risk factors: exercising 3 days week  He has tried nothing for the symptoms. The treatment provided no relief.     Vitals:    07/16/18 1110   BP: 118/78   Pulse: (!) 50   Resp: 18   Temp: 98.6 °F (37 °C)     Review of Systems   Constitutional: Negative for fatigue, fever and weight loss.   HENT: Negative.    Eyes: Negative.    Respiratory: Negative for cough and shortness of breath.    Cardiovascular: Negative for chest pain.   Gastrointestinal: Negative for abdominal pain, bowel incontinence, diarrhea and nausea.   Endocrine: Negative.    Genitourinary: Negative for bladder incontinence, dysuria, hematuria and pelvic pain.   Musculoskeletal: Positive for back pain.   Skin: Negative for color change and rash.   Allergic/Immunologic: Negative.    Neurological: Negative.  Negative for tingling, weakness, numbness, headaches and paresthesias.   Hematological: Negative.        Past Medical History:   Diagnosis Date    CAD (coronary artery disease)     DDD  (degenerative disc disease), lumbar     Diverticulosis     GERD (gastroesophageal reflux disease)     Hemorrhoid     Hernia of unspecified site of abdominal cavity without mention of obstruction or gangrene     hiatal hernia    HLD (hyperlipidemia)     HTN (hypertension)      Objective:      Physical Exam   Constitutional: He is oriented to person, place, and time. He appears well-developed and well-nourished.   HENT:   Head: Normocephalic and atraumatic.   Mouth/Throat: Oropharynx is clear and moist.   Eyes: Conjunctivae and EOM are normal. Pupils are equal, round, and reactive to light.   Neck: Normal range of motion. Neck supple.   Cardiovascular: Normal rate, regular rhythm, normal heart sounds and intact distal pulses.    Pulmonary/Chest: Effort normal and breath sounds normal.   Abdominal: Soft. Bowel sounds are normal.   Musculoskeletal:        Thoracic back: He exhibits tenderness, pain and spasm.        Lumbar back: He exhibits normal range of motion, no tenderness, no pain and no spasm.        Back:    Neurological: He is alert and oriented to person, place, and time.   Skin: Skin is warm and dry.   Psychiatric: He has a normal mood and affect. His behavior is normal.   Nursing note and vitals reviewed.      Assessment:       1. Chronic bilateral low back pain without sciatica    2. Myalgia    3. Essential hypertension    4. Coronary artery disease involving native coronary artery of native heart without angina pectoris        Plan:       Chronic bilateral low back pain without sciatica  -     X-Ray Lumbar Spine Complete 5 View; Future; Expected date: 07/16/2018  -     Ambulatory Referral to Physical/Occupational Therapy    Myalgia  -     X-Ray Lumbar Spine Complete 5 View; Future; Expected date: 07/16/2018  -     X-Ray Thoracic Spine AP Lateral; Future; Expected date: 07/16/2018  -     Ambulatory Referral to Physical/Occupational Therapy    Essential hypertension  Stable     Coronary artery disease  involving native coronary artery of native heart without angina pectoris  Stable - seeing cards       Fu if not better   Discussed would need therapy to help

## 2018-07-17 ENCOUNTER — TELEPHONE (OUTPATIENT)
Dept: FAMILY MEDICINE | Facility: CLINIC | Age: 78
End: 2018-07-17

## 2018-07-17 ENCOUNTER — HOSPITAL ENCOUNTER (OUTPATIENT)
Dept: RADIOLOGY | Facility: HOSPITAL | Age: 78
Discharge: HOME | End: 2018-07-17
Attending: NURSE PRACTITIONER
Payer: MEDICARE

## 2018-07-17 DIAGNOSIS — Z87.81 HISTORY OF COMPRESSION FRACTURE OF SPINE: ICD-10-CM

## 2018-07-17 DIAGNOSIS — M89.9 DISORDER OF BONE: ICD-10-CM

## 2018-07-17 DIAGNOSIS — M85.80 OSTEOPENIA, UNSPECIFIED LOCATION: ICD-10-CM

## 2018-07-17 PROCEDURE — 77080 DXA BONE DENSITY AXIAL: CPT | Mod: 26,,, | Performed by: RADIOLOGY

## 2018-07-17 PROCEDURE — 77080 DXA BONE DENSITY AXIAL: CPT | Mod: TC,PO

## 2018-07-17 RX ORDER — ERGOCALCIFEROL 1.25 MG/1
50000 CAPSULE ORAL
Qty: 8 CAPSULE | Refills: 0 | Status: SHIPPED | OUTPATIENT
Start: 2018-07-17 | End: 2018-09-05 | Stop reason: SDUPTHER

## 2018-07-17 NOTE — TELEPHONE ENCOUNTER
Please call and let him know that his bone density does show some osteopenia   I want him to start on vit d that I sent to the pharmacy - 06914 unit tablet 1 time a week for 8 weeks - then he should FU with Gill in 2-3 months which will be his normal fu anyway

## 2018-07-25 ENCOUNTER — TELEPHONE (OUTPATIENT)
Dept: FAMILY MEDICINE | Facility: CLINIC | Age: 78
End: 2018-07-25

## 2018-07-25 RX ORDER — TIZANIDINE 4 MG/1
4 TABLET ORAL EVERY 8 HOURS PRN
Qty: 30 TABLET | Refills: 0 | Status: SHIPPED | OUTPATIENT
Start: 2018-07-25 | End: 2018-08-04

## 2018-07-25 NOTE — TELEPHONE ENCOUNTER
Spoke to patient and informed him that a muscle relaxant had been sent to his pharmacy. Pt verbalized understanding.

## 2018-07-25 NOTE — TELEPHONE ENCOUNTER
----- Message from Zara Luna sent at 7/24/2018  3:58 PM CDT -----  Contact: self  Type: Needs Medical Advice    Who Called:  self  Symptoms (please be specific):  Severe lower back pain  How long has patient had these symptoms:  chronic  Pharmacy name and phone #:  [facility]  Best Call Back Number: [phone]  Additional Information: patient is in pain and would like pain medication until he can get in with Sujatha Mcleod. He can hardly walk. Please call patient. Thanks!     [facility] [address] - [address] [facility] [address]  [address]  [facility] [address]  Phone: [phone] Fax: [phone]

## 2018-07-25 NOTE — TELEPHONE ENCOUNTER
Patient calling for something for pain. Patient has  an apt with Sujatha Mcleod on 7/31/18 but pain has gotten worse saw Lesa BERNAL she offered something for pain patient states denied wasn't that bad but has gotten worse please advise.

## 2018-07-31 ENCOUNTER — OFFICE VISIT (OUTPATIENT)
Dept: SPINE | Facility: CLINIC | Age: 78
End: 2018-07-31
Payer: MEDICARE

## 2018-07-31 VITALS
HEART RATE: 54 BPM | WEIGHT: 183.19 LBS | DIASTOLIC BLOOD PRESSURE: 64 MMHG | HEIGHT: 69 IN | BODY MASS INDEX: 27.13 KG/M2 | SYSTOLIC BLOOD PRESSURE: 140 MMHG

## 2018-07-31 DIAGNOSIS — M43.16 SPONDYLOLISTHESIS OF LUMBAR REGION: Primary | ICD-10-CM

## 2018-07-31 DIAGNOSIS — M54.50 CHRONIC RIGHT-SIDED LOW BACK PAIN WITHOUT SCIATICA: ICD-10-CM

## 2018-07-31 DIAGNOSIS — M47.816 LUMBAR SPONDYLOSIS: ICD-10-CM

## 2018-07-31 DIAGNOSIS — G89.29 CHRONIC RIGHT-SIDED LOW BACK PAIN WITHOUT SCIATICA: ICD-10-CM

## 2018-07-31 PROCEDURE — 99999 PR PBB SHADOW E&M-EST. PATIENT-LVL III: CPT | Mod: PBBFAC,,, | Performed by: PHYSICIAN ASSISTANT

## 2018-07-31 PROCEDURE — 99203 OFFICE O/P NEW LOW 30 MIN: CPT | Mod: S$PBB,,, | Performed by: PHYSICIAN ASSISTANT

## 2018-07-31 PROCEDURE — 99213 OFFICE O/P EST LOW 20 MIN: CPT | Mod: PBBFAC,PN | Performed by: PHYSICIAN ASSISTANT

## 2018-07-31 NOTE — LETTER
July 31, 2018      [facility]  [address] [facility] [address]           [facility]  [address] [facility] [address]  [facility] [address]  Phone: [phone]  Fax: [phone]          Patient: Sean Main   MR Number: [id number]   YOB: 1940   Date of Visit: 7/31/2018       Dear [facility]:    Thank you for referring Sean Main to me for evaluation. Attached you will find relevant portions of my assessment and plan of care.    If you have questions, please do not hesitate to call me. I look forward to following Sean Main along with you.    Sincerely,    Sujatha Mcleod PA-C    Enclosure  CC:  No Recipients    If you would like to receive this communication electronically, please contact [email or web address] or [phone] to request more information on [employer] access.    For providers and/or their staff who would like to refer a patient to [facility], please contact us through our one-stop-shop provider referral line, [facility], at [phone].    If you feel you have received this communication in error or would no longer like to receive these types of communications, please e-mail [email or web address]

## 2018-07-31 NOTE — PROGRESS NOTES
Neurosurgery History & Physical    Patient ID: Sean Main is a 77 y.o. male.    Chief Complaint   Patient presents with    Low-back Pain     Has had back pain all his life but it would come and go. In the last 2 months has gotten worse and in the last 2 weeks he has been unable to get around due to pain. Pain is in the right side of the low back. Pain medication makes the pain tolerable. Walking makes pain worse.       Review of Systems   Constitutional: Negative for activity change, chills, fatigue and unexpected weight change.   HENT: Negative for hearing loss, tinnitus, trouble swallowing and voice change.    Eyes: Negative for visual disturbance.   Respiratory: Negative for apnea, chest tightness and shortness of breath.    Cardiovascular: Negative for chest pain and palpitations.   Gastrointestinal: Negative for abdominal pain, constipation, diarrhea, nausea and vomiting.   Genitourinary: Negative for difficulty urinating, dysuria and frequency.   Musculoskeletal: Positive for back pain. Negative for gait problem, neck pain and neck stiffness.   Skin: Negative for wound.   Neurological: Negative for dizziness, tremors, seizures, facial asymmetry, speech difficulty, weakness, light-headedness, numbness and headaches.   Psychiatric/Behavioral: Negative for confusion and decreased concentration.       Past Medical History:   Diagnosis Date    CAD (coronary artery disease)     DDD (degenerative disc disease), lumbar     Diverticulosis     GERD (gastroesophageal reflux disease)     Hemorrhoid     Hernia of unspecified site of abdominal cavity without mention of obstruction or gangrene     hiatal hernia    HLD (hyperlipidemia)     HTN (hypertension)      Social History     Social History    Marital status:      Spouse name: N/A    Number of children: N/A    Years of education: N/A     Occupational History    Not on file.     Social History Main Topics    Smoking status: Never Smoker     Smokeless tobacco: Never Used    Alcohol use No    Drug use: No    Sexual activity: Not on file     Other Topics Concern    Not on file     Social History Narrative    No narrative on file     Family History   Problem Relation Age of Onset    Cirrhosis Neg Hx     Liver cancer Neg Hx     Liver disease Neg Hx     Colon cancer Neg Hx      Review of patient's allergies indicates:   Allergen Reactions    Lipitor [atorvastatin]      Myalgia      Pravastatin      myalgias       Current Outpatient Prescriptions:     acetaminophen (TYLENOL) 325 MG tablet, Take 325 mg by mouth every 6 (six) hours as needed.  , Disp: , Rfl:     amlodipine (NORVASC) 5 MG tablet, TAKE ONE TABLET BY MOUTH ONCE DAILY IN THE EVENING, Disp: 90 tablet, Rfl: 4    aspirin (ECOTRIN) 81 MG EC tablet, Take 81 mg by mouth once daily., Disp: , Rfl:     atorvastatin (LIPITOR) 10 MG tablet, TAKE ONE-HALF TABLET BY MOUTH ONCE DAILY, Disp: 45 tablet, Rfl: 3    carvedilol (COREG) 3.125 MG tablet, TAKE ONE-HALF TABLET BY MOUTH TWICE DAILY WITH MEALS, Disp: 45 tablet, Rfl: 11    ergocalciferol (ERGOCALCIFEROL) 50,000 unit Cap, Take 1 capsule (50,000 Units total) by mouth every 7 days., Disp: 8 capsule, Rfl: 0    ezetimibe (ZETIA) 10 mg tablet, Take one tablets by mouth once daily, Disp: 30 tablet, Rfl: 11    multivitamin,tx-iron-minerals (THERAPEUTIC MULTIVITAMIN-MINERALS) Tab, Take 1 tablet by mouth once daily., Disp: , Rfl:     niacin 50 MG Tab, Take 100 mg by mouth every evening., Disp: , Rfl:     pantoprazole (PROTONIX) 40 MG tablet, TAKE ONE TABLET BY MOUTH ONCE DAILY, Disp: 30 tablet, Rfl: 11    saw palmetto 500 MG capsule, Take 500 mg by mouth 2 (two) times daily., Disp: , Rfl:     tiZANidine (ZANAFLEX) 4 MG tablet, Take 1 tablet (4 mg total) by mouth every 8 (eight) hours as needed (Back pain)., Disp: 30 tablet, Rfl: 0    Vitals:    07/31/18 1324   BP: (!) 140/64   BP Location: Left arm   Patient Position: Sitting   BP Method: Medium  "(Manual)   Pulse: (!) 54   Weight: 83.1 kg (183 lb 3.2 oz)   Height: 5' 9" (1.753 m)       Physical Exam   Constitutional: He is oriented to person, place, and time. He appears well-developed and well-nourished.   HENT:   Head: Normocephalic and atraumatic.   Eyes: Pupils are equal, round, and reactive to light.   Neck: Normal range of motion. Neck supple.   Cardiovascular: Normal rate.    Pulmonary/Chest: Effort normal.   Abdominal: He exhibits no distension.   Musculoskeletal: Normal range of motion. He exhibits no edema.   Neurological: He is alert and oriented to person, place, and time. He has a normal Finger-Nose-Finger Test, a normal Heel to Shin Test, a normal Romberg Test and a normal Tandem Gait Test. Gait normal.   Reflex Scores:       Tricep reflexes are 2+ on the right side and 2+ on the left side.       Bicep reflexes are 2+ on the right side and 2+ on the left side.       Brachioradialis reflexes are 2+ on the right side and 2+ on the left side.       Patellar reflexes are 2+ on the right side and 2+ on the left side.       Achilles reflexes are 2+ on the right side and 2+ on the left side.  Skin: Skin is warm and dry.   Psychiatric: He has a normal mood and affect. His speech is normal and behavior is normal. Judgment and thought content normal.   Nursing note and vitals reviewed.      Neurologic Exam     Mental Status   Oriented to person, place, and time.   Oriented to person.   Oriented to place.   Oriented to time.   Follows 3 step commands.   Attention: normal. Concentration: normal.   Speech: speech is normal   Level of consciousness: alert  Knowledge: consistent with education.   Able to name object. Able to read. Able to repeat. Able to write. Normal comprehension.     Cranial Nerves     CN II   Visual acuity: normal  Right visual field deficit: none  Left visual field deficit: none     CN III, IV, VI   Pupils are equal, round, and reactive to light.  Right pupil: Size: 3 mm. Shape: regular. " Reactivity: brisk. Consensual response: intact.   Left pupil: Size: 3 mm. Shape: regular. Reactivity: brisk. Consensual response: intact.   CN III: no CN III palsy  CN VI: no CN VI palsy  Nystagmus: none   Diplopia: none  Ophthalmoparesis: none  Conjugate gaze: present    CN V   Right facial sensation deficit: none  Left facial sensation deficit: none    CN VII   Right facial weakness: none  Left facial weakness: none    CN VIII   Hearing: intact    CN IX, X   CN IX normal.   CN X normal.     CN XI   Right sternocleidomastoid strength: normal  Left sternocleidomastoid strength: normal  Right trapezius strength: normal  Left trapezius strength: normal    CN XII   Fasciculations: absent  Tongue deviation: none    Motor Exam   Muscle bulk: normal  Overall muscle tone: normal  Right arm pronator drift: absent  Left arm pronator drift: absent    Strength   Right neck flexion: 5/5  Left neck flexion: 5/5  Right neck extension: 5/5  Left neck extension: 5/5  Right deltoid: 5/5  Left deltoid: 5/5  Right biceps: 5/5  Left biceps: 5/5  Right triceps: 5/5  Left triceps: 5/5  Right wrist flexion: 5/5  Left wrist flexion: 5/5  Right wrist extension: 5/5  Left wrist extension: 5/5  Right interossei: 5/5  Left interossei: 5/5  Right abdominals: 5/5  Left abdominals: 5/5  Right iliopsoas: 5/5  Left iliopsoas: 5/5  Right quadriceps: 5/5  Left quadriceps: 5/5  Right hamstrin/5  Left hamstrin/5  Right glutei: 5/5  Left glutei: 5/5  Right anterior tibial: 5/5  Left anterior tibial: 5/5  Right posterior tibial: 5/5  Left posterior tibial: 5/5  Right peroneal: 5/5  Left peroneal: 5/5  Right gastroc: 5/5  Left gastroc: 5/5    Sensory Exam   Right arm light touch: normal  Left arm light touch: normal  Right leg light touch: normal  Left leg light touch: normal  Right arm vibration: normal  Left arm vibration: normal  Right arm pinprick: normal  Left arm pinprick: normal    Gait, Coordination, and Reflexes     Gait  Gait:  normal    Coordination   Romberg: negative  Finger to nose coordination: normal  Heel to shin coordination: normal  Tandem walking coordination: normal    Tremor   Resting tremor: absent  Intention tremor: absent  Action tremor: absent    Reflexes   Right brachioradialis: 2+  Left brachioradialis: 2+  Right biceps: 2+  Left biceps: 2+  Right triceps: 2+  Left triceps: 2+  Right patellar: 2+  Left patellar: 2+  Right achilles: 2+  Left achilles: 2+  Right Lemus: absent  Left Lemus: absent  Right ankle clonus: absent  Left ankle clonus: absent      Provider dictation:  77 year old male with history of hypertension and chronic back pain is referred by Lesa Mari for evaluation of back pain.  He recalls an injury at the age of 19 that was initially treated with chiropractic care.  He has continued to have intermittent lower back pain since then.  Pain has become constant over the last 2 months and increased in severity 2 weeks ago.  He has pain in the right lower back without radicular pain/ numbness/ tingling into the lower extremities.  Pain increases with walking.  Tizanidine has been helping to control pain.  He has not had PT or RUBÉN.  Oswestry score: 31%.  PHQ:  1.    He is neurologically intact on exam.    I have reviewed an xray of the lumbar spine from 7-16-18 demonstrating a remote T12 anterior wedge compression deformity with 50% height loss and associated thoracic kyphosis.  There are multilevel degenerative changes with Grade I retrolisthesis of L3 on L4, facet arthropathy and disk space narrowing.    In light of degenerative changes, L3 retrolisthesis on L4 and long standing pain, I recommend further assessment with lumbar MRI and flexion/ extension xrays to assess stability at L3/4.  Follow up in clinic after imaging is complete.    Visit Diagnosis:  Spondylolisthesis of lumbar region    Lumbar spondylosis    Chronic right-sided low back pain without sciatica        Total time spent counseling  greater than fifty percent of total visit time.  Counseling included discussion regarding imaging findings, diagnosis possibilities, treatment options, risks and benefits.   The patient had many questions regarding the options and long-term effects.

## 2018-08-09 ENCOUNTER — HOSPITAL ENCOUNTER (OUTPATIENT)
Dept: RADIOLOGY | Facility: HOSPITAL | Age: 78
Discharge: HOME | End: 2018-08-09
Attending: PHYSICIAN ASSISTANT
Payer: MEDICARE

## 2018-08-09 DIAGNOSIS — M43.16 SPONDYLOLISTHESIS OF LUMBAR REGION: ICD-10-CM

## 2018-08-09 DIAGNOSIS — M47.816 LUMBAR SPONDYLOSIS: ICD-10-CM

## 2018-08-09 DIAGNOSIS — G89.29 CHRONIC RIGHT-SIDED LOW BACK PAIN WITHOUT SCIATICA: ICD-10-CM

## 2018-08-09 DIAGNOSIS — M54.50 CHRONIC RIGHT-SIDED LOW BACK PAIN WITHOUT SCIATICA: ICD-10-CM

## 2018-08-09 PROCEDURE — 72100 X-RAY EXAM L-S SPINE 2/3 VWS: CPT | Mod: TC,FY,PO

## 2018-08-09 PROCEDURE — 72100 X-RAY EXAM L-S SPINE 2/3 VWS: CPT | Mod: 26,,, | Performed by: RADIOLOGY

## 2018-08-09 PROCEDURE — 72148 MRI LUMBAR SPINE W/O DYE: CPT | Mod: TC,PO

## 2018-08-09 PROCEDURE — 72148 MRI LUMBAR SPINE W/O DYE: CPT | Mod: 26,,, | Performed by: RADIOLOGY

## 2018-08-10 ENCOUNTER — OFFICE VISIT (OUTPATIENT)
Dept: SPINE | Facility: CLINIC | Age: 78
End: 2018-08-10
Payer: MEDICARE

## 2018-08-10 VITALS
BODY MASS INDEX: 27.13 KG/M2 | DIASTOLIC BLOOD PRESSURE: 70 MMHG | WEIGHT: 183.19 LBS | HEART RATE: 51 BPM | HEIGHT: 69 IN | SYSTOLIC BLOOD PRESSURE: 133 MMHG

## 2018-08-10 DIAGNOSIS — M54.50 CHRONIC RIGHT-SIDED LOW BACK PAIN WITHOUT SCIATICA: ICD-10-CM

## 2018-08-10 DIAGNOSIS — M43.16 SPONDYLOLISTHESIS OF LUMBAR REGION: Primary | ICD-10-CM

## 2018-08-10 DIAGNOSIS — G89.29 CHRONIC RIGHT-SIDED LOW BACK PAIN WITHOUT SCIATICA: ICD-10-CM

## 2018-08-10 DIAGNOSIS — M47.816 LUMBAR SPONDYLOSIS: ICD-10-CM

## 2018-08-10 PROCEDURE — 99213 OFFICE O/P EST LOW 20 MIN: CPT | Mod: PBBFAC,PN | Performed by: PHYSICIAN ASSISTANT

## 2018-08-10 PROCEDURE — 99999 PR PBB SHADOW E&M-EST. PATIENT-LVL III: CPT | Mod: PBBFAC,,, | Performed by: PHYSICIAN ASSISTANT

## 2018-08-10 PROCEDURE — 99214 OFFICE O/P EST MOD 30 MIN: CPT | Mod: S$PBB,,, | Performed by: PHYSICIAN ASSISTANT

## 2018-08-10 NOTE — PROGRESS NOTES
Neurosurgery History & Physical    Patient ID: Sean Main is a 77 y.o. male.    Chief Complaint   Patient presents with    Follow-up     X-ray and MRI results       Review of Systems   Constitutional: Negative for activity change, chills, fatigue and unexpected weight change.   HENT: Negative for hearing loss, tinnitus, trouble swallowing and voice change.    Eyes: Negative for visual disturbance.   Respiratory: Negative for apnea, chest tightness and shortness of breath.    Cardiovascular: Negative for chest pain and palpitations.   Gastrointestinal: Negative for abdominal pain, constipation, diarrhea, nausea and vomiting.   Genitourinary: Negative for difficulty urinating, dysuria and frequency.   Musculoskeletal: Positive for back pain. Negative for gait problem, neck pain and neck stiffness.   Skin: Negative for wound.   Neurological: Negative for dizziness, tremors, seizures, facial asymmetry, speech difficulty, weakness, light-headedness, numbness and headaches.   Psychiatric/Behavioral: Negative for confusion and decreased concentration.       Past Medical History:   Diagnosis Date    CAD (coronary artery disease)     DDD (degenerative disc disease), lumbar     Diverticulosis     GERD (gastroesophageal reflux disease)     Hemorrhoid     Hernia of unspecified site of abdominal cavity without mention of obstruction or gangrene     hiatal hernia    HLD (hyperlipidemia)     HTN (hypertension)      Social History     Social History    Marital status:      Spouse name: N/A    Number of children: N/A    Years of education: N/A     Occupational History    Not on file.     Social History Main Topics    Smoking status: Never Smoker    Smokeless tobacco: Never Used    Alcohol use No    Drug use: No    Sexual activity: Not on file     Other Topics Concern    Not on file     Social History Narrative    No narrative on file     Family History   Problem Relation Age of Onset    Cirrhosis  "Neg Hx     Liver cancer Neg Hx     Liver disease Neg Hx     Colon cancer Neg Hx      Review of patient's allergies indicates:   Allergen Reactions    Lipitor [atorvastatin]      Myalgia      Pravastatin      myalgias       Current Outpatient Prescriptions:     acetaminophen (TYLENOL) 325 MG tablet, Take 325 mg by mouth every 6 (six) hours as needed.  , Disp: , Rfl:     amlodipine (NORVASC) 5 MG tablet, TAKE ONE TABLET BY MOUTH ONCE DAILY IN THE EVENING, Disp: 90 tablet, Rfl: 4    aspirin (ECOTRIN) 81 MG EC tablet, Take 81 mg by mouth once daily., Disp: , Rfl:     atorvastatin (LIPITOR) 10 MG tablet, TAKE ONE-HALF TABLET BY MOUTH ONCE DAILY, Disp: 45 tablet, Rfl: 3    carvedilol (COREG) 3.125 MG tablet, TAKE ONE-HALF TABLET BY MOUTH TWICE DAILY WITH MEALS, Disp: 45 tablet, Rfl: 11    ergocalciferol (ERGOCALCIFEROL) 50,000 unit Cap, Take 1 capsule (50,000 Units total) by mouth every 7 days., Disp: 8 capsule, Rfl: 0    ezetimibe (ZETIA) 10 mg tablet, Take one tablets by mouth once daily, Disp: 30 tablet, Rfl: 11    multivitamin,tx-iron-minerals (THERAPEUTIC MULTIVITAMIN-MINERALS) Tab, Take 1 tablet by mouth once daily., Disp: , Rfl:     niacin 50 MG Tab, Take 100 mg by mouth every evening., Disp: , Rfl:     pantoprazole (PROTONIX) 40 MG tablet, TAKE ONE TABLET BY MOUTH ONCE DAILY, Disp: 30 tablet, Rfl: 11    saw palmetto 500 MG capsule, Take 500 mg by mouth 2 (two) times daily., Disp: , Rfl:     Vitals:    08/10/18 0920   BP: 133/70   BP Location: Left arm   Patient Position: Sitting   BP Method: Medium (Automatic)   Pulse: (!) 51   Weight: 83.1 kg (183 lb 3.2 oz)   Height: 5' 9" (1.753 m)       Physical Exam   Constitutional: He is oriented to person, place, and time. He appears well-developed and well-nourished.   HENT:   Head: Normocephalic and atraumatic.   Eyes: Pupils are equal, round, and reactive to light.   Neck: Normal range of motion. Neck supple.   Cardiovascular: Normal rate.  "   Pulmonary/Chest: Effort normal.   Abdominal: He exhibits no distension.   Musculoskeletal: Normal range of motion. He exhibits no edema.   Neurological: He is alert and oriented to person, place, and time. He has a normal Finger-Nose-Finger Test, a normal Heel to Shin Test, a normal Romberg Test and a normal Tandem Gait Test. Gait normal.   Reflex Scores:       Tricep reflexes are 2+ on the right side and 2+ on the left side.       Bicep reflexes are 2+ on the right side and 2+ on the left side.       Brachioradialis reflexes are 2+ on the right side and 2+ on the left side.       Patellar reflexes are 2+ on the right side and 2+ on the left side.       Achilles reflexes are 2+ on the right side and 2+ on the left side.  Skin: Skin is warm and dry.   Psychiatric: He has a normal mood and affect. His speech is normal and behavior is normal. Judgment and thought content normal.   Nursing note and vitals reviewed.      Neurologic Exam     Mental Status   Oriented to person, place, and time.   Oriented to person.   Oriented to place.   Oriented to time.   Follows 3 step commands.   Attention: normal. Concentration: normal.   Speech: speech is normal   Level of consciousness: alert  Knowledge: consistent with education.   Able to name object. Able to read. Able to repeat. Able to write. Normal comprehension.     Cranial Nerves     CN II   Visual acuity: normal  Right visual field deficit: none  Left visual field deficit: none     CN III, IV, VI   Pupils are equal, round, and reactive to light.  Right pupil: Size: 3 mm. Shape: regular. Reactivity: brisk. Consensual response: intact.   Left pupil: Size: 3 mm. Shape: regular. Reactivity: brisk. Consensual response: intact.   CN III: no CN III palsy  CN VI: no CN VI palsy  Nystagmus: none   Diplopia: none  Ophthalmoparesis: none  Conjugate gaze: present    CN V   Right facial sensation deficit: none  Left facial sensation deficit: none    CN VII   Right facial weakness:  none  Left facial weakness: none    CN VIII   Hearing: intact    CN IX, X   CN IX normal.   CN X normal.     CN XI   Right sternocleidomastoid strength: normal  Left sternocleidomastoid strength: normal  Right trapezius strength: normal  Left trapezius strength: normal    CN XII   Fasciculations: absent  Tongue deviation: none    Motor Exam   Muscle bulk: normal  Overall muscle tone: normal  Right arm pronator drift: absent  Left arm pronator drift: absent    Strength   Right neck flexion: 5/5  Left neck flexion: 5/5  Right neck extension: 5/5  Left neck extension: 5/5  Right deltoid: 5/5  Left deltoid: 5/5  Right biceps: 5/5  Left biceps: 5/5  Right triceps: 5/5  Left triceps: 5/5  Right wrist flexion: 5/5  Left wrist flexion: 5/5  Right wrist extension: 5/5  Left wrist extension: 5/5  Right interossei: 5/5  Left interossei: 5/5  Right abdominals: 5/5  Left abdominals: 5/5  Right iliopsoas: 5/5  Left iliopsoas: 5/5  Right quadriceps: 5/5  Left quadriceps: 5/5  Right hamstrin/5  Left hamstrin/5  Right glutei: 5/5  Left glutei: 5/5  Right anterior tibial: 5/5  Left anterior tibial: 5/5  Right posterior tibial: 5/5  Left posterior tibial: 5/5  Right peroneal: 5/5  Left peroneal: 5/5  Right gastroc: 5/5  Left gastroc: 5/5    Sensory Exam   Right arm light touch: normal  Left arm light touch: normal  Right leg light touch: normal  Left leg light touch: normal  Right arm vibration: normal  Left arm vibration: normal  Right arm pinprick: normal  Left arm pinprick: normal    Gait, Coordination, and Reflexes     Gait  Gait: normal    Coordination   Romberg: negative  Finger to nose coordination: normal  Heel to shin coordination: normal  Tandem walking coordination: normal    Tremor   Resting tremor: absent  Intention tremor: absent  Action tremor: absent    Reflexes   Right brachioradialis: 2+  Left brachioradialis: 2+  Right biceps: 2+  Left biceps: 2+  Right triceps: 2+  Left triceps: 2+  Right patellar: 2+  Left  patellar: 2+  Right achilles: 2+  Left achilles: 2+  Right Lemus: absent  Left Lemus: absent  Right ankle clonus: absent  Left ankle clonus: absent      Provider dictation:  77 year old male with history of hypertension and chronic back pain is here for follow up of right sided lower back pain after undergoing an MRI and xrays of the lumbar spine.  He recalls an injury at the age of 19 that was initially treated with chiropractic care.  He has continued to have intermittent lower back pain since then.  At the time of his last visit, he had 2 months of right lower back without radicular pain/ numbness/ tingling into the lower extremities.  Sinde then pain has improved and he currently only feels pain with certain movements.  Tizanidine has been helping to control pain.  He has not had PT or RUBÉN.  Oswestry score: 31%.  PHQ:  1.    He is neurologically intact on exam.    I have reviewed an xray of the lumbar spine from 7-16-18 demonstrating a remote T12 anterior wedge compression deformity with 50% height loss and associated thoracic kyphosis.  There are multilevel degenerative changes with Grade I retrolisthesis of L3 on L4, facet arthropathy and disk space narrowing.  Flexion/ extension images reveal no significant movement.    I have reviewed an MRI lumbar spine demonstrating multi level degenerative changes including facet arthropathy and disk/ osteophyte complexes with mild-moderate degrees of foraminal narrowing from L3/4 to L5/S1.  There is L3 retrolisthesis on L4 with mild right greater than left facet effusions.    Lower back pain without radiculopathy is likely due to L3/4 retrolisthesis with spondylosis and facet effusions.  We discussed the results and natural progression expected in the future.  With no significant limiting pain at this time, he will proceed with home exercise.  If pain recurs and does not improve in the future, further options include PT, RUBÉN and surgical intervention.  Follow up as  needed.    Visit Diagnosis:  Spondylolisthesis of lumbar region    Lumbar spondylosis    Chronic right-sided low back pain without sciatica        Total time spent counseling greater than fifty percent of total visit time.  Counseling included discussion regarding imaging findings, diagnosis possibilities, treatment options, risks and benefits.   The patient had many questions regarding the options and long-term effects.

## 2018-08-28 RX ORDER — AMLODIPINE BESYLATE 5 MG/1
TABLET ORAL
Qty: 90 TABLET | Refills: 4 | Status: SHIPPED | OUTPATIENT
Start: 2018-08-28 | End: 2019-12-02 | Stop reason: SDUPTHER

## 2018-09-04 RX ORDER — EZETIMIBE 10 MG/1
TABLET ORAL
Qty: 30 TABLET | Refills: 11 | Status: SHIPPED | OUTPATIENT
Start: 2018-09-04 | End: 2019-09-12 | Stop reason: SDUPTHER

## 2018-09-06 RX ORDER — ERGOCALCIFEROL 1.25 MG/1
CAPSULE ORAL
Qty: 8 CAPSULE | Refills: 0 | Status: SHIPPED | OUTPATIENT
Start: 2018-09-06 | End: 2020-01-27

## 2018-09-17 ENCOUNTER — OFFICE VISIT (OUTPATIENT)
Dept: FAMILY MEDICINE | Facility: CLINIC | Age: 78
End: 2018-09-17
Payer: MEDICARE

## 2018-09-17 VITALS
SYSTOLIC BLOOD PRESSURE: 132 MMHG | OXYGEN SATURATION: 96 % | WEIGHT: 185.44 LBS | HEIGHT: 69 IN | DIASTOLIC BLOOD PRESSURE: 68 MMHG | BODY MASS INDEX: 27.46 KG/M2 | HEART RATE: 52 BPM | TEMPERATURE: 98 F

## 2018-09-17 DIAGNOSIS — I25.10 CORONARY ARTERY DISEASE INVOLVING NATIVE CORONARY ARTERY OF NATIVE HEART WITHOUT ANGINA PECTORIS: Chronic | ICD-10-CM

## 2018-09-17 DIAGNOSIS — I10 ESSENTIAL HYPERTENSION: Chronic | ICD-10-CM

## 2018-09-17 DIAGNOSIS — Z12.5 PROSTATE CANCER SCREENING: ICD-10-CM

## 2018-09-17 DIAGNOSIS — E78.5 DYSLIPIDEMIA: Chronic | ICD-10-CM

## 2018-09-17 DIAGNOSIS — E55.9 VITAMIN D DEFICIENCY: Primary | ICD-10-CM

## 2018-09-17 PROCEDURE — 99214 OFFICE O/P EST MOD 30 MIN: CPT | Mod: PBBFAC,PO | Performed by: FAMILY MEDICINE

## 2018-09-17 PROCEDURE — 99214 OFFICE O/P EST MOD 30 MIN: CPT | Mod: S$PBB,,, | Performed by: FAMILY MEDICINE

## 2018-09-17 PROCEDURE — 99999 PR PBB SHADOW E&M-EST. PATIENT-LVL IV: CPT | Mod: PBBFAC,,, | Performed by: FAMILY MEDICINE

## 2018-09-17 NOTE — PROGRESS NOTES
Subjective:       Patient ID: Sean Main is a 77 y.o. male.    Chief Complaint: Follow-up Hyperlidemia    Here for annual exam and to f/u on osteopenia.  Started on vitamin D 50,000 IU weekly 8 weeks ago  CAD s/p CABG - following with cardiology  HTN - tolerating amlodipine 5mg daily and Coreg 3.125mg 1/2 BID' occ skipped beat at night  HLD - tolerating Zetia 10, Lipitor 10mg 1/2 tablet daily  GERD/hiatal hernia - Protonix 40mg PRN    Past Medical History:    CAD (coronary artery disease)                                 GERD (gastroesophageal reflux disease)                        Hernia of unspecified site of abdominal cavity*                 Comment:hiatal hernia    Diverticulosis                                                HTN (hypertension)                                            HLD (hyperlipidemia)                                          Past Surgical History:    CORONARY ARTERY BYPASS GRAFT                     4/4/2012        Comment:6 vessels    UPPER GASTROINTESTINAL ENDOSCOPY                 2011            Comment:hiatal hernia    COLONOSCOPY                                      2008            Comment:diverticulosis    CHOLECYSTECTOMY                                                LIVER BIOPSY                                                   Allergies:   -- No Known Drug Allergies    -- Pravastatin     --  myalgias    Social History    Marital Status:                         Social History Main Topics    Smoking Status: Never Smoker                      Smokeless Status: Never Used                        Alcohol Use: No              Drug Use: No                Current Outpatient Medications on File Prior to Visit:  amLODIPine (NORVASC) 5 MG tablet, TAKE ONE TABLET BY MOUTH ONCE DAILY IN THE EVENING, Disp: 90 tablet, Rfl: 4  aspirin (ECOTRIN) 81 MG EC tablet, Take 81 mg by mouth once daily., Disp: , Rfl:   atorvastatin (LIPITOR) 10 MG tablet, TAKE ONE-HALF TABLET BY MOUTH ONCE  DAILY, Disp: 45 tablet, Rfl: 3  carvedilol (COREG) 3.125 MG tablet, TAKE ONE-HALF TABLET BY MOUTH TWICE DAILY WITH MEALS, Disp: 45 tablet, Rfl: 11  ezetimibe (ZETIA) 10 mg tablet, TAKE ONE TABLET BY MOUTH ONCE DAILY, Disp: 30 tablet, Rfl: 11  multivitamin,tx-iron-minerals (THERAPEUTIC MULTIVITAMIN-MINERALS) Tab, Take 1 tablet by mouth once daily., Disp: , Rfl:   niacin 50 MG Tab, Take 100 mg by mouth every evening., Disp: , Rfl:   pantoprazole (PROTONIX) 40 MG tablet, TAKE ONE TABLET BY MOUTH ONCE DAILY, Disp: 30 tablet, Rfl: 11  saw palmetto 500 MG capsule, Take 500 mg by mouth 2 (two) times daily., Disp: , Rfl:   VITAMIN D2 50,000 unit capsule, TAKE 1 CAPSULE BY MOUTH ONCE A WEEK, Disp: 8 capsule, Rfl: 0  (DISCONTINUED) acetaminophen (TYLENOL) 325 MG tablet, Take 325 mg by mouth every 6 (six) hours as needed.  , Disp: , Rfl:     No current facility-administered medications on file prior to visit.             Review of patient's family history indicates:    Cirrhosis                      Neg Hx                    Liver cancer                   Neg Hx                    Liver disease                  Neg Hx                    Colon cancer                   Neg Hx                        Hypertension   Pertinent negatives include no chest pain, headaches, neck pain, palpitations or shortness of breath.     Review of Systems   Constitutional: Negative for appetite change, fatigue, fever and unexpected weight change.   HENT: Negative for congestion, ear discharge, ear pain, nosebleeds, postnasal drip, rhinorrhea, sinus pressure, sore throat and trouble swallowing.    Eyes: Negative for pain, discharge, redness and visual disturbance.   Respiratory: Negative for apnea, cough, chest tightness, shortness of breath and wheezing.    Cardiovascular: Negative for chest pain, palpitations and leg swelling.   Gastrointestinal: Negative for abdominal distention, abdominal pain, blood in stool, constipation, diarrhea, nausea and  "vomiting.   Genitourinary: Negative for difficulty urinating, dysuria, flank pain, frequency, hematuria, penile swelling (genital pain or swelling), scrotal swelling, testicular pain and urgency.   Musculoskeletal: Negative for back pain, gait problem, joint swelling, myalgias and neck pain.   Skin: Negative for color change, rash and wound.   Neurological: Negative for dizziness, tremors, seizures, syncope, speech difficulty, weakness, light-headedness, numbness and headaches.   Hematological: Negative for adenopathy. Does not bruise/bleed easily.   Psychiatric/Behavioral: Negative for behavioral problems, confusion, hallucinations and suicidal ideas.       Objective:       /68   Pulse (!) 52   Temp 98.2 °F (36.8 °C) (Oral)   Ht 5' 9" (1.753 m)   Wt 84.1 kg (185 lb 6.5 oz)   SpO2 96%   BMI 27.38 kg/m²     Physical Exam   Constitutional: He is oriented to person, place, and time. He appears well-developed and well-nourished. He is active. No distress.   HENT:   Head: Normocephalic and atraumatic.   Right Ear: External ear normal.   Left Ear: External ear normal.   Mouth/Throat: Uvula is midline, oropharynx is clear and moist and mucous membranes are normal. No oropharyngeal exudate.   Eyes: Conjunctivae, EOM and lids are normal. Pupils are equal, round, and reactive to light.   Neck: Normal range of motion, full passive range of motion without pain and phonation normal. Neck supple. No thyroid mass and no thyromegaly present.   Cardiovascular: Normal rate, regular rhythm, normal heart sounds and intact distal pulses. Exam reveals no gallop and no friction rub.   No murmur heard.  Pulmonary/Chest: Effort normal and breath sounds normal. No respiratory distress. He has no wheezes. He has no rales.   Abdominal: Soft. Bowel sounds are normal. He exhibits no distension and no mass. There is no tenderness. There is no rebound and no guarding.   Musculoskeletal: Normal range of motion.   Lymphadenopathy:     He " has no cervical adenopathy.   Neurological: He is alert and oriented to person, place, and time. No cranial nerve deficit. Coordination normal.   Skin: Skin is warm and dry.   Psychiatric: He has a normal mood and affect. His speech is normal and behavior is normal. Judgment and thought content normal.       Results for orders placed or performed in visit on 10/03/17   Comprehensive metabolic panel   Result Value Ref Range    Sodium 141 136 - 145 mmol/L    Potassium 4.9 3.5 - 5.1 mmol/L    Chloride 107 95 - 110 mmol/L    CO2 27 23 - 29 mmol/L    Glucose 101 70 - 110 mg/dL    BUN, Bld 16 8 - 23 mg/dL    Creatinine 1.0 0.5 - 1.4 mg/dL    Calcium 9.7 8.7 - 10.5 mg/dL    Total Protein 7.5 6.0 - 8.4 g/dL    Albumin 4.0 3.5 - 5.2 g/dL    Total Bilirubin 0.9 0.1 - 1.0 mg/dL    Alkaline Phosphatase 81 55 - 135 U/L    AST 30 10 - 40 U/L    ALT 30 10 - 44 U/L    Anion Gap 7 (L) 8 - 16 mmol/L    eGFR if African American >60.0 >60 mL/min/1.73 m^2    eGFR if non African American >60.0 >60 mL/min/1.73 m^2     Assessment:       1. Vitamin D deficiency    2. Prostate cancer screening    3. Essential hypertension    4. Dyslipidemia    5. Coronary artery disease involving native coronary artery of native heart without angina pectoris        Plan:       Vitamin D deficiency  -     Vitamin D; Future; Expected date: 09/17/2018    Prostate cancer screening  -     PSA, Screening; Future; Expected date: 09/17/2018    Essential hypertension    Dyslipidemia    Coronary artery disease involving native coronary artery of native heart without angina pectoris            Continue vitamin D  Will call with results and advice on whether to continue vitamin D  Counseled on regular exercise, maintenance of a healthy weight, balanced diet rich in fruits/vegetables and lean protein, and avoidance of unhealthy habits like smoking and excessive alcohol intake.  Continue present meds  F/u 1 year or as needed

## 2018-10-16 ENCOUNTER — LAB VISIT (OUTPATIENT)
Dept: LAB | Facility: HOSPITAL | Age: 78
End: 2018-10-16
Attending: INTERNAL MEDICINE
Payer: MEDICARE

## 2018-10-16 ENCOUNTER — CLINICAL SUPPORT (OUTPATIENT)
Dept: CARDIOLOGY | Facility: CLINIC | Age: 78
End: 2018-10-16
Attending: INTERNAL MEDICINE
Payer: MEDICARE

## 2018-10-16 VITALS
WEIGHT: 185 LBS | DIASTOLIC BLOOD PRESSURE: 70 MMHG | HEIGHT: 69 IN | BODY MASS INDEX: 27.4 KG/M2 | HEART RATE: 60 BPM | SYSTOLIC BLOOD PRESSURE: 136 MMHG

## 2018-10-16 DIAGNOSIS — Z95.1 S/P CABG (CORONARY ARTERY BYPASS GRAFT): ICD-10-CM

## 2018-10-16 DIAGNOSIS — I10 ESSENTIAL HYPERTENSION: ICD-10-CM

## 2018-10-16 DIAGNOSIS — E55.9 VITAMIN D DEFICIENCY: ICD-10-CM

## 2018-10-16 DIAGNOSIS — Z12.5 PROSTATE CANCER SCREENING: ICD-10-CM

## 2018-10-16 DIAGNOSIS — I49.3 PVC (PREMATURE VENTRICULAR CONTRACTION): ICD-10-CM

## 2018-10-16 DIAGNOSIS — I10 ESSENTIAL HYPERTENSION: Chronic | ICD-10-CM

## 2018-10-16 LAB
25(OH)D3+25(OH)D2 SERPL-MCNC: 50 NG/ML
ALBUMIN SERPL BCP-MCNC: 4.1 G/DL
ALP SERPL-CCNC: 93 U/L
ALT SERPL W/O P-5'-P-CCNC: 34 U/L
ANION GAP SERPL CALC-SCNC: 6 MMOL/L
AST SERPL-CCNC: 31 U/L
BILIRUB SERPL-MCNC: 0.9 MG/DL
BUN SERPL-MCNC: 14 MG/DL
CALCIUM SERPL-MCNC: 9.8 MG/DL
CHLORIDE SERPL-SCNC: 107 MMOL/L
CHOLEST SERPL-MCNC: 152 MG/DL
CHOLEST/HDLC SERPL: 3 {RATIO}
CK SERPL-CCNC: 160 U/L
CO2 SERPL-SCNC: 28 MMOL/L
COMPLEXED PSA SERPL-MCNC: 1.1 NG/ML
CREAT SERPL-MCNC: 1 MG/DL
EST. GFR  (AFRICAN AMERICAN): >60 ML/MIN/1.73 M^2
EST. GFR  (NON AFRICAN AMERICAN): >60 ML/MIN/1.73 M^2
GLUCOSE SERPL-MCNC: 97 MG/DL
HDLC SERPL-MCNC: 51 MG/DL
HDLC SERPL: 33.6 %
LDLC SERPL CALC-MCNC: 74.8 MG/DL
NONHDLC SERPL-MCNC: 101 MG/DL
POTASSIUM SERPL-SCNC: 4.8 MMOL/L
PROT SERPL-MCNC: 7.6 G/DL
SODIUM SERPL-SCNC: 141 MMOL/L
TRIGL SERPL-MCNC: 131 MG/DL
TSH SERPL DL<=0.005 MIU/L-ACNC: 1.49 UIU/ML

## 2018-10-16 PROCEDURE — 80061 LIPID PANEL: CPT

## 2018-10-16 PROCEDURE — 99212 OFFICE O/P EST SF 10 MIN: CPT | Mod: PBBFAC,PO

## 2018-10-16 PROCEDURE — 99999 PR PBB SHADOW E&M-EST. PATIENT-LVL II: CPT | Mod: PBBFAC,,,

## 2018-10-16 PROCEDURE — 36415 COLL VENOUS BLD VENIPUNCTURE: CPT | Mod: PO

## 2018-10-16 PROCEDURE — 82550 ASSAY OF CK (CPK): CPT

## 2018-10-16 PROCEDURE — 84153 ASSAY OF PSA TOTAL: CPT

## 2018-10-16 PROCEDURE — 82306 VITAMIN D 25 HYDROXY: CPT

## 2018-10-16 PROCEDURE — 80053 COMPREHEN METABOLIC PANEL: CPT

## 2018-10-16 PROCEDURE — 84443 ASSAY THYROID STIM HORMONE: CPT

## 2018-10-16 PROCEDURE — 93306 TTE W/DOPPLER COMPLETE: CPT | Mod: PBBFAC,PO | Performed by: INTERNAL MEDICINE

## 2018-10-22 LAB
ASCENDING AORTA: 2.8 CM
AV MEAN GRADIENT: 5.22 MMHG
AV PEAK GRADIENT: 9.83 MMHG
AV VALVE AREA: 1.89 CM2
BSA FOR ECHO PROCEDURE: 2.02 M2
CV ECHO LV RWT: 0.55 CM
DOP CALC AO PEAK VEL: 1.57 M/S
DOP CALC AO VTI: 42.87 CM
DOP CALC LVOT AREA: 3.23 CM2
DOP CALC LVOT DIAMETER: 2.03 CM
DOP CALC LVOT STROKE VOLUME: 80.84 CM3
DOP CALCLVOT PEAK VEL VTI: 24.99 CM
E WAVE DECELERATION TIME: 178.89 MSEC
E/A RATIO: 1.34
E/E' RATIO: 9.76
ECHO LV POSTERIOR WALL: 1.01 CM (ref 0.6–1.1)
FRACTIONAL SHORTENING: 41 % (ref 28–44)
INTERVENTRICULAR SEPTUM: 1.23 CM (ref 0.6–1.1)
IVRT: 0.1 MSEC
LA MAJOR: 4.5 CM
LA MINOR: 4.75 CM
LA WIDTH: 4.01 CM
LEFT ATRIUM SIZE: 4.85 CM
LEFT ATRIUM VOLUME INDEX: 37.8 ML/M2
LEFT ATRIUM VOLUME: 76.4 CM3
LEFT INTERNAL DIMENSION IN SYSTOLE: 2.38 CM (ref 2.1–4)
LEFT VENTRICLE DIASTOLIC VOLUME INDEX: 35.82 ML/M2
LEFT VENTRICLE DIASTOLIC VOLUME: 72.36 ML
LEFT VENTRICLE MASS INDEX: 75.7 G/M2
LEFT VENTRICLE SYSTOLIC VOLUME INDEX: 9.8 ML/M2
LEFT VENTRICLE SYSTOLIC VOLUME: 19.81 ML
LEFT VENTRICULAR INTERNAL DIMENSION IN DIASTOLE: 4.06 CM (ref 3.5–6)
LEFT VENTRICULAR MASS: 152.96 G
LV LATERAL E/E' RATIO: 7.55
LV SEPTAL E/E' RATIO: 13.83
MV PEAK A VEL: 0.62 M/S
MV PEAK E VEL: 0.83 M/S
PISA TR MAX VEL: 2.6 M/S
PULM VEIN S/D RATIO: 0.99
PV PEAK D VEL: 0.7 M/S
PV PEAK S VEL: 0.69 M/S
RA MAJOR: 4.99 CM
RA PRESSURE: 3 MMHG
RA WIDTH: 3.38 CM
RETIRED EF AND QEF - SEE NOTES: 72.62 %
RIGHT VENTRICULAR END-DIASTOLIC DIMENSION: 3.72 CM
SINUS: 2.7 CM
STJ: 2.71 CM
TDI LATERAL: 0.11
TDI SEPTAL: 0.06
TDI: 0.09
TR MAX PG: 27.04 MMHG
TRICUSPID ANNULAR PLANE SYSTOLIC EXCURSION: 0.01 CM
TV REST PULMONARY ARTERY PRESSURE: 30.04 MMHG

## 2018-11-02 ENCOUNTER — NURSE TRIAGE (OUTPATIENT)
Dept: ADMINISTRATIVE | Facility: CLINIC | Age: 78
End: 2018-11-02

## 2018-11-02 RX ORDER — MECLIZINE HCL 12.5 MG 12.5 MG/1
12.5 TABLET ORAL 2 TIMES DAILY PRN
Qty: 10 TABLET | Refills: 0 | Status: SHIPPED | OUTPATIENT
Start: 2018-11-02 | End: 2021-02-26

## 2018-11-02 NOTE — TELEPHONE ENCOUNTER
Just as you directed- if any changes with dizziness, fatigue and sob, - needs to go to ER like the nurse directed him   I did give him 10 meclizine- I would suggest that he be seen again next week

## 2018-11-02 NOTE — TELEPHONE ENCOUNTER
Spoke to patient. Informed patient of instructions. Patient verbalized understanding. Scheduled appt.

## 2018-11-02 NOTE — TELEPHONE ENCOUNTER
Reason for Disposition   MODERATE neck pain (e.g., interferes with normal activities like work or school)    Protocols used: ST NECK PAIN OR [id number]    Mr. Main states he has been experiencing neck stiffness and dizziness. Patient states his blood pressure was 135/72 with a pulse of 52, then 134/67 with a pulse of 51. Patient advised to be seen today. He states he does not feel comfortable driving and he does not have anyone to bring him to the office.

## 2018-11-02 NOTE — TELEPHONE ENCOUNTER
Spoke to patient. Patient says since he spoke to the triage nurse he has taken meclizine. He says he forgot to mention to her that he has a hx of inner ear problem that causes him to have dizziness. He says the meclizine did help. He wasn't feeling completely better but better than when he called in. He says it is an old prescription and is asking for a renewed rx of the meclizine. Offered patient appt in clinic. He says he does not feel comfortable coming in but think he will be okay if he can get another prescription for the meclizine. Advised if his symptoms worsen he should go to ER for evaluation. Patient verbalized understanding.

## 2018-11-05 ENCOUNTER — OFFICE VISIT (OUTPATIENT)
Dept: FAMILY MEDICINE | Facility: CLINIC | Age: 78
End: 2018-11-05
Payer: MEDICARE

## 2018-11-05 VITALS
BODY MASS INDEX: 27.3 KG/M2 | RESPIRATION RATE: 18 BRPM | WEIGHT: 184.31 LBS | OXYGEN SATURATION: 97 % | DIASTOLIC BLOOD PRESSURE: 80 MMHG | HEART RATE: 53 BPM | SYSTOLIC BLOOD PRESSURE: 138 MMHG | TEMPERATURE: 98 F | HEIGHT: 69 IN

## 2018-11-05 DIAGNOSIS — R00.1 BRADYCARDIA: Chronic | ICD-10-CM

## 2018-11-05 DIAGNOSIS — I10 ESSENTIAL HYPERTENSION: Chronic | ICD-10-CM

## 2018-11-05 DIAGNOSIS — Z95.1 S/P CABG (CORONARY ARTERY BYPASS GRAFT): ICD-10-CM

## 2018-11-05 DIAGNOSIS — R42 VERTIGO: ICD-10-CM

## 2018-11-05 DIAGNOSIS — E78.5 DYSLIPIDEMIA: Chronic | ICD-10-CM

## 2018-11-05 DIAGNOSIS — R42 DIZZINESS: Primary | ICD-10-CM

## 2018-11-05 DIAGNOSIS — I25.10 CORONARY ARTERY DISEASE INVOLVING NATIVE CORONARY ARTERY OF NATIVE HEART WITHOUT ANGINA PECTORIS: Chronic | ICD-10-CM

## 2018-11-05 PROCEDURE — 99214 OFFICE O/P EST MOD 30 MIN: CPT | Mod: PBBFAC,PO | Performed by: NURSE PRACTITIONER

## 2018-11-05 PROCEDURE — 99999 PR PBB SHADOW E&M-EST. PATIENT-LVL IV: CPT | Mod: PBBFAC,,, | Performed by: NURSE PRACTITIONER

## 2018-11-05 PROCEDURE — 99214 OFFICE O/P EST MOD 30 MIN: CPT | Mod: S$PBB,,, | Performed by: NURSE PRACTITIONER

## 2018-11-05 NOTE — PROGRESS NOTES
Subjective:       Patient ID: Sean Main is a 77 y.o. male.    Chief Complaint: Dizziness (pt states the dizziness is improving)    Here today to FU on dizziness   Called 4 days ago with Dizzy episode - did not feel safe to drive - wanted refill on meclizine as this worked in the past   Alerted him to go to ER if any chest pain or other issues     Episodes of dizziness are fewer than 1-2 times a year. Dizziness started when getting out of bed in the morning, considers severe. Has to lay down and then lean on furniture to get around house. When the dizziness started 4 days ago he took mezlizine 3 times the first day and then daily after to prevent the dizziness from reoccurring. Had nausea with dizziness, no vomiting heart palpitations, SOB, CP. States he was told several years ago that the dizziness was due to fluid build up in his ears. No recent upper resp symptoms, no seasonal allergies. Staying hydrated, drinks 6-8 glasses of water daily.       Dizziness:   Chronicity:  Chronic with acute exacerbation  Onset:  In the past 7 days  Progression since onset:  Gradually improving  Severity:  Severe  Duration:  2-5 hours  Dizziness characteristics:  Off-balance   Associated symptoms: nausea.no hearing loss, no ear congestion, no ear pain, no fever, no headaches, no tinnitus, no vomiting, no weakness, no visual disturbances, no light-headedness, no syncope, no palpitations, no panic, no facial weakness, no slurred speech, no numbness in extremities and no chest pain.  Aggravated by:  Lying down  Treatments tried:  Meclizine  Improvements on treatment:  Significant   PMH includes: cardiac surgery and CT head.no strokes, no neurologic disease, no head trauma, no balance testing, no ear trauma, no ear surgery, no head trauma, no ear infections, no anxiety, no ear tubes, no environmental allergies and no MRI head.    Vitals:    11/05/18 1052   BP: 138/80   Pulse: (!) 53   Resp: 18   Temp: 98 °F (36.7 °C)     Review  of Systems   Constitutional: Negative for fatigue and fever.   HENT: Negative.  Negative for congestion, ear discharge, ear pain, hearing loss, postnasal drip, rhinorrhea, sinus pressure, sinus pain, sneezing, sore throat and tinnitus.    Eyes: Negative.    Respiratory: Negative for cough and shortness of breath.    Cardiovascular: Negative for chest pain, palpitations and syncope.   Gastrointestinal: Positive for nausea. Negative for abdominal pain, diarrhea and vomiting.   Endocrine: Negative.    Genitourinary: Negative for dysuria and hematuria.   Musculoskeletal: Negative.    Skin: Negative for color change and rash.   Allergic/Immunologic: Negative.  Negative for environmental allergies.   Neurological: Positive for dizziness. Negative for weakness, light-headedness, numbness and headaches.   Hematological: Negative.        Past Medical History:   Diagnosis Date    CAD (coronary artery disease)     Compression fracture of T12 vertebra     DDD (degenerative disc disease), lumbar     Diverticulosis     GERD (gastroesophageal reflux disease)     Hemorrhoid     Hernia of unspecified site of abdominal cavity without mention of obstruction or gangrene     hiatal hernia    HLD (hyperlipidemia)     HTN (hypertension)      Objective:      Physical Exam   Constitutional: He is oriented to person, place, and time. He appears well-developed and well-nourished.   HENT:   Head: Normocephalic and atraumatic.   Right Ear: Hearing, tympanic membrane, external ear and ear canal normal.   Left Ear: Hearing, tympanic membrane, external ear and ear canal normal.   Nose: Nose normal. No mucosal edema, rhinorrhea or sinus tenderness. Right sinus exhibits no maxillary sinus tenderness and no frontal sinus tenderness. Left sinus exhibits no maxillary sinus tenderness and no frontal sinus tenderness.   Mouth/Throat: Oropharynx is clear and moist.   Eyes: Conjunctivae and EOM are normal. Pupils are equal, round, and reactive to  light.   Neck: Normal range of motion. Neck supple.   Cardiovascular: Normal rate, regular rhythm, normal heart sounds and intact distal pulses.   Pulmonary/Chest: Effort normal and breath sounds normal.   Abdominal: Soft. Bowel sounds are normal.   Musculoskeletal: Normal range of motion.   Neurological: He is alert and oriented to person, place, and time. He has normal strength. No sensory deficit.   Skin: Skin is warm and dry.   Psychiatric: He has a normal mood and affect. His behavior is normal.   Nursing note and vitals reviewed.      Assessment:       1. Dizziness    2. Coronary artery disease involving native coronary artery of native heart without angina pectoris    3. Essential hypertension    4. Bradycardia    5. Dyslipidemia    6. S/P CABG (coronary artery bypass graft)        Plan:       Dizziness/vertigo   -     Ambulatory referral to ENT  Discussed if any increased dizziness needs to see ENT     Coronary artery disease involving native coronary artery of native heart without angina pectoris  Sees Dr Meyers - stable     Essential hypertension  On meds - stable     Bradycardia  On coreg 2/2 CABG - stable - no issues with low HR     Dyslipidemia  On statin     S/P CABG (coronary artery bypass graft)            Discussed good hydration   Monitoring BP   Feeling better   Fu as planned

## 2018-12-11 DIAGNOSIS — K21.9 GASTROESOPHAGEAL REFLUX DISEASE, ESOPHAGITIS PRESENCE NOT SPECIFIED: ICD-10-CM

## 2018-12-11 RX ORDER — PANTOPRAZOLE SODIUM 40 MG/1
TABLET, DELAYED RELEASE ORAL
Qty: 30 TABLET | Refills: 11 | Status: SHIPPED | OUTPATIENT
Start: 2018-12-11 | End: 2019-12-24

## 2019-03-25 DIAGNOSIS — E78.5 HYPERLIPIDEMIA, UNSPECIFIED HYPERLIPIDEMIA TYPE: ICD-10-CM

## 2019-03-26 RX ORDER — ATORVASTATIN CALCIUM 10 MG/1
TABLET, FILM COATED ORAL
Qty: 45 TABLET | Refills: 0 | Status: SHIPPED | OUTPATIENT
Start: 2019-03-26 | End: 2019-06-22 | Stop reason: SDUPTHER

## 2019-03-26 NOTE — PROGRESS NOTES
Refill Authorization Note     is requesting a refill authorization.    Brief assessment and rationale for refill: APPROVE: nonadherence  Name and strength of medication: ATORVASTATIN 10MG   TAB  Medication-related problems identified: Non-adherence (knowledge deficit) non-intentional    Medication Therapy Plan: last lipids completed 10/18; slight nonadherence; jack 3 more                   How patient will take medication: t0.5t po daily           Comments:   Lab Results   Component Value Date    CHOL 152 10/16/2018    CHOL 151 09/28/2017    CHOL 164 08/03/2016    CHOL 164 08/03/2016     Lab Results   Component Value Date    HDL 51 10/16/2018    HDL 46 09/28/2017    HDL 48 08/03/2016    HDL 48 08/03/2016     Lab Results   Component Value Date    LDLCALC 74.8 10/16/2018    LDLCALC 75.0 09/28/2017    LDLCALC 75.8 08/03/2016    LDLCALC 75.8 08/03/2016     Lab Results   Component Value Date    TRIG 131 10/16/2018    TRIG 150 09/28/2017    TRIG 201 (H) 08/03/2016    TRIG 201 (H) 08/03/2016     Lab Results   Component Value Date    CHOLHDL 33.6 10/16/2018    CHOLHDL 30.5 09/28/2017    CHOLHDL 29.3 08/03/2016    CHOLHDL 29.3 08/03/2016

## 2019-06-22 DIAGNOSIS — E78.5 HYPERLIPIDEMIA, UNSPECIFIED HYPERLIPIDEMIA TYPE: ICD-10-CM

## 2019-06-22 RX ORDER — ATORVASTATIN CALCIUM 10 MG/1
TABLET, FILM COATED ORAL
Qty: 45 TABLET | Refills: 3 | Status: SHIPPED | OUTPATIENT
Start: 2019-06-22 | End: 2020-06-24

## 2019-06-22 RX ORDER — CARVEDILOL 3.12 MG/1
TABLET ORAL
Qty: 90 TABLET | Refills: 3 | Status: SHIPPED | OUTPATIENT
Start: 2019-06-22 | End: 2020-06-23

## 2019-07-23 ENCOUNTER — OFFICE VISIT (OUTPATIENT)
Dept: FAMILY MEDICINE | Facility: CLINIC | Age: 79
End: 2019-07-23
Payer: MEDICARE

## 2019-07-23 VITALS
HEART RATE: 44 BPM | OXYGEN SATURATION: 97 % | WEIGHT: 181.88 LBS | DIASTOLIC BLOOD PRESSURE: 78 MMHG | SYSTOLIC BLOOD PRESSURE: 118 MMHG | HEIGHT: 69 IN | BODY MASS INDEX: 26.94 KG/M2

## 2019-07-23 DIAGNOSIS — S33.5XXA LUMBAR SPRAIN, INITIAL ENCOUNTER: Primary | ICD-10-CM

## 2019-07-23 PROCEDURE — 99213 PR OFFICE/OUTPT VISIT, EST, LEVL III, 20-29 MIN: ICD-10-PCS | Mod: S$PBB,,, | Performed by: FAMILY MEDICINE

## 2019-07-23 PROCEDURE — 99999 PR PBB SHADOW E&M-EST. PATIENT-LVL III: CPT | Mod: PBBFAC,,, | Performed by: FAMILY MEDICINE

## 2019-07-23 PROCEDURE — 99213 OFFICE O/P EST LOW 20 MIN: CPT | Mod: PBBFAC,PO | Performed by: FAMILY MEDICINE

## 2019-07-23 PROCEDURE — 99999 PR PBB SHADOW E&M-EST. PATIENT-LVL III: ICD-10-PCS | Mod: PBBFAC,,, | Performed by: FAMILY MEDICINE

## 2019-07-23 PROCEDURE — 99213 OFFICE O/P EST LOW 20 MIN: CPT | Mod: S$PBB,,, | Performed by: FAMILY MEDICINE

## 2019-07-23 NOTE — PROGRESS NOTES
Subjective:       Patient ID: Sean Main is a 78 y.o. male.    Chief Complaint: Back Pain    C/o some recurrent back pain about 1 month ago.  It seems to be getting worse.  It is worse with movement and transitions.  It is worse with prolonged walking.  No pain at rest.  Pain is worse on the right.  No radiation.  Some popping in that area.  Not taking any meds for this.        Past Medical History:   Diagnosis Date    CAD (coronary artery disease)     Compression fracture of T12 vertebra     DDD (degenerative disc disease), lumbar     Diverticulosis     GERD (gastroesophageal reflux disease)     Hemorrhoid     Hernia of unspecified site of abdominal cavity without mention of obstruction or gangrene     hiatal hernia    HLD (hyperlipidemia)     HTN (hypertension)        Past Surgical History:   Procedure Laterality Date    BASAL CELL CARCINOMA EXCISION  08/01/2016    left nasal    BIOPSY, LIVER N/A 4/5/2013    [facility] 2ND FLR    CHOLANGIOGRAM N/A 4/5/2013    Performed by [facility] II[facility] at [facility] OR 2ND FLR    CHOLECYSTECTOMY      CHOLECYSTECTOMY N/A 4/5/2013    Performed by [facility] II[facility] at [facility] OR 2ND FLR    COLONOSCOPY  2008    diverticulosis    CORONARY ARTERY BYPASS GRAFT  4/4/2012    6 vessels    LIVER BIOPSY      UPPER GASTROINTESTINAL ENDOSCOPY  2011    hiatal hernia       Review of patient's allergies indicates:   Allergen Reactions    Lipitor [atorvastatin]      Myalgia      Pravastatin      myalgias       Social History     Socioeconomic History    Marital status:      Spouse name: Not on file    Number of children: Not on file    Years of education: Not on file    Highest education level: Not on file   Occupational History    Not on file   Social Needs    Financial resource strain: Not on file    Food insecurity:     Worry: Not on file     Inability: Not on file    Transportation needs:     Medical: Not  on file     Non-medical: Not on file   Tobacco Use    Smoking status: Never Smoker    Smokeless tobacco: Never Used   Substance and Sexual Activity    Alcohol use: No    Drug use: No    Sexual activity: Not on file   Lifestyle    Physical activity:     Days per week: Not on file     Minutes per session: Not on file    Stress: Not on file   Relationships    Social connections:     Talks on phone: Not on file     Gets together: Not on file     Attends Yazidism service: Not on file     Active member of club or organization: Not on file     Attends meetings of clubs or organizations: Not on file     Relationship status: Not on file   Other Topics Concern    Not on file   Social History Narrative    Not on file       Current Outpatient Medications on File Prior to Visit   Medication Sig Dispense Refill    amLODIPine (NORVASC) 5 MG tablet TAKE ONE TABLET BY MOUTH ONCE DAILY IN THE EVENING 90 tablet 4    aspirin (ECOTRIN) 81 MG EC tablet Take 81 mg by mouth once daily.      atorvastatin (LIPITOR) 10 MG tablet TAKE 1/2 (ONE-HALF) TABLET BY MOUTH ONCE DAILY 45 tablet 3    carvedilol (COREG) 3.125 MG tablet TAKE 1/2 (ONE-HALF) TABLET BY MOUTH TWICE DAILY WITH FOOD 90 tablet 3    ezetimibe (ZETIA) 10 mg tablet TAKE ONE TABLET BY MOUTH ONCE DAILY 30 tablet 11    meclizine (ANTIVERT) 12.5 mg tablet Take 1 tablet (12.5 mg total) by mouth 2 (two) times daily as needed. 10 tablet 0    multivitamin,tx-iron-minerals (THERAPEUTIC MULTIVITAMIN-MINERALS) Tab Take 1 tablet by mouth once daily.      niacin 50 MG Tab Take 100 mg by mouth every evening.      pantoprazole (PROTONIX) 40 MG tablet TAKE ONE TABLET BY MOUTH ONCE DAILY 30 tablet 11    saw palmetto 500 MG capsule Take 500 mg by mouth 2 (two) times daily.      VITAMIN D2 50,000 unit capsule TAKE 1 CAPSULE BY MOUTH ONCE A WEEK 8 capsule 0     No current facility-administered medications on file prior to visit.        Family History   Problem Relation Age of  "Onset    Cirrhosis Neg Hx     Liver cancer Neg Hx     Liver disease Neg Hx     Colon cancer Neg Hx        Review of Systems   Constitutional: Negative for appetite change, chills, fever and unexpected weight change.   HENT: Negative for sore throat and trouble swallowing.    Eyes: Negative for pain and visual disturbance.   Respiratory: Negative for cough, chest tightness, shortness of breath and wheezing.    Cardiovascular: Negative for chest pain, palpitations and leg swelling.   Gastrointestinal: Negative for abdominal pain, blood in stool, constipation, diarrhea and nausea.   Genitourinary: Negative for difficulty urinating, dysuria and hematuria.   Musculoskeletal: Positive for back pain. Negative for arthralgias, gait problem and neck pain.   Skin: Negative for rash and wound.   Neurological: Negative for dizziness, weakness, light-headedness and headaches.   Hematological: Negative for adenopathy.   Psychiatric/Behavioral: Negative for dysphoric mood.       Objective:      /78   Pulse (!) 44   Ht 5' 9" (1.753 m)   Wt 82.5 kg (181 lb 14.1 oz)   SpO2 97%   BMI 26.86 kg/m²   Physical Exam   Constitutional: He is oriented to person, place, and time. He appears well-developed and well-nourished. He is active. No distress.   HENT:   Head: Normocephalic and atraumatic.   Right Ear: External ear normal.   Left Ear: External ear normal.   Mouth/Throat: Uvula is midline, oropharynx is clear and moist and mucous membranes are normal. No oropharyngeal exudate.   Eyes: Pupils are equal, round, and reactive to light. Conjunctivae, EOM and lids are normal.   Neck: Normal range of motion, full passive range of motion without pain and phonation normal. Neck supple. No thyroid mass and no thyromegaly present.   Cardiovascular: Normal rate, regular rhythm, normal heart sounds and intact distal pulses. Exam reveals no gallop and no friction rub.   No murmur heard.  Pulmonary/Chest: Effort normal and breath sounds " normal. No respiratory distress. He has no wheezes. He has no rales.   Musculoskeletal: Normal range of motion.        Lumbar back: He exhibits spasm. He exhibits normal range of motion, no bony tenderness and no deformity.        Arms:  Lymphadenopathy:     He has no cervical adenopathy.   Neurological: He is alert and oriented to person, place, and time. No cranial nerve deficit. Coordination normal.   Skin: Skin is warm and dry.   Psychiatric: He has a normal mood and affect. His speech is normal and behavior is normal. Judgment and thought content normal.       Assessment:       1. Lumbar sprain, initial encounter        Plan:       Lumbar sprain, initial encounter        Recommend home stretching and lifting precautions  Reassurance  Likely needs 2-3 more weeks to heal on its own  Consider PT if symptoms persist

## 2019-09-09 DIAGNOSIS — E78.5 DYSLIPIDEMIA: Primary | Chronic | ICD-10-CM

## 2019-09-09 NOTE — PROGRESS NOTES
Refill Authorization Note     is requesting a refill authorization.    Brief assessment and rationale for refill: DEFER: nco; needs labs(LIPIDS)  Name and strength of medication: EZETIMIBE 10MG TAB       Medication Therapy Plan: Indication not commented on recently; NTBO(lipids); DEFER to you                   How patient will take medication: t1t po daily          Comments:   Lab Results   Component Value Date    CHOL 152 10/16/2018    CHOL 151 09/28/2017    CHOL 164 08/03/2016    CHOL 164 08/03/2016     Lab Results   Component Value Date    HDL 51 10/16/2018    HDL 46 09/28/2017    HDL 48 08/03/2016    HDL 48 08/03/2016     Lab Results   Component Value Date    LDLCALC 74.8 10/16/2018    LDLCALC 75.0 09/28/2017    LDLCALC 75.8 08/03/2016    LDLCALC 75.8 08/03/2016     Lab Results   Component Value Date    TRIG 131 10/16/2018    TRIG 150 09/28/2017    TRIG 201 (H) 08/03/2016    TRIG 201 (H) 08/03/2016     Lab Results   Component Value Date    CHOLHDL 33.6 10/16/2018    CHOLHDL 30.5 09/28/2017    CHOLHDL 29.3 08/03/2016    CHOLHDL 29.3 08/03/2016

## 2019-09-09 NOTE — TELEPHONE ENCOUNTER
Please see the following assessment. This refill request still requires some action on your part. Indication not commented on recently. Last lipid panel 10/18. DEFER to you. Thank you.

## 2019-09-12 RX ORDER — EZETIMIBE 10 MG/1
10 TABLET ORAL DAILY
Qty: 30 TABLET | Refills: 11 | Status: SHIPPED | OUTPATIENT
Start: 2019-09-12 | End: 2020-01-27 | Stop reason: SDUPTHER

## 2019-09-12 RX ORDER — EZETIMIBE 10 MG/1
TABLET ORAL
Qty: 90 TABLET | Refills: 3 | Status: SHIPPED | OUTPATIENT
Start: 2019-09-12 | End: 2020-09-30

## 2019-09-12 NOTE — TELEPHONE ENCOUNTER
----- Message from Deyanira Carl sent at 9/11/2019  4:21 PM CDT -----  Contact: self  Patient requesting refill on ezetimibe (ZETIA) 10 mg tablet        [address] - [address] [facility] [address]  [address]   [facility] [address]  Phone: [phone] Fax: [phone]      Patient contact [phone] (home)

## 2019-12-02 RX ORDER — AMLODIPINE BESYLATE 5 MG/1
TABLET ORAL
Qty: 90 TABLET | Refills: 4 | Status: SHIPPED | OUTPATIENT
Start: 2019-12-02 | End: 2021-02-24

## 2019-12-23 DIAGNOSIS — K21.9 GASTROESOPHAGEAL REFLUX DISEASE, ESOPHAGITIS PRESENCE NOT SPECIFIED: ICD-10-CM

## 2019-12-24 RX ORDER — PANTOPRAZOLE SODIUM 40 MG/1
TABLET, DELAYED RELEASE ORAL
Qty: 90 TABLET | Refills: 3 | Status: SHIPPED | OUTPATIENT
Start: 2019-12-24 | End: 2021-02-18

## 2020-01-27 ENCOUNTER — OFFICE VISIT (OUTPATIENT)
Dept: FAMILY MEDICINE | Facility: CLINIC | Age: 80
End: 2020-01-27
Payer: MEDICARE

## 2020-01-27 ENCOUNTER — HOSPITAL ENCOUNTER (OUTPATIENT)
Dept: RADIOLOGY | Facility: HOSPITAL | Age: 80
Discharge: HOME | End: 2020-01-27
Attending: NURSE PRACTITIONER
Payer: MEDICARE

## 2020-01-27 VITALS
DIASTOLIC BLOOD PRESSURE: 80 MMHG | TEMPERATURE: 98 F | WEIGHT: 182.13 LBS | HEART RATE: 54 BPM | SYSTOLIC BLOOD PRESSURE: 124 MMHG | HEIGHT: 69 IN | OXYGEN SATURATION: 98 % | BODY MASS INDEX: 26.97 KG/M2

## 2020-01-27 DIAGNOSIS — R40.1 DAZED STATE: ICD-10-CM

## 2020-01-27 DIAGNOSIS — I10 ESSENTIAL HYPERTENSION: Chronic | ICD-10-CM

## 2020-01-27 DIAGNOSIS — J34.89 RHINORRHEA: ICD-10-CM

## 2020-01-27 DIAGNOSIS — Z12.5 SCREENING FOR PROSTATE CANCER: ICD-10-CM

## 2020-01-27 DIAGNOSIS — K21.9 GASTROESOPHAGEAL REFLUX DISEASE, ESOPHAGITIS PRESENCE NOT SPECIFIED: ICD-10-CM

## 2020-01-27 DIAGNOSIS — E55.9 VITAMIN D DEFICIENCY: ICD-10-CM

## 2020-01-27 DIAGNOSIS — R51.9 PRESSURE IN HEAD: ICD-10-CM

## 2020-01-27 DIAGNOSIS — I25.810 CORONARY ARTERY DISEASE INVOLVING CORONARY BYPASS GRAFT OF NATIVE HEART WITHOUT ANGINA PECTORIS: ICD-10-CM

## 2020-01-27 DIAGNOSIS — I25.810 CORONARY ARTERY DISEASE INVOLVING CORONARY BYPASS GRAFT OF NATIVE HEART WITHOUT ANGINA PECTORIS: Primary | Chronic | ICD-10-CM

## 2020-01-27 DIAGNOSIS — E78.5 DYSLIPIDEMIA: Chronic | ICD-10-CM

## 2020-01-27 DIAGNOSIS — I10 ESSENTIAL HYPERTENSION: ICD-10-CM

## 2020-01-27 PROCEDURE — 99214 OFFICE O/P EST MOD 30 MIN: CPT | Mod: S$PBB,,, | Performed by: NURSE PRACTITIONER

## 2020-01-27 PROCEDURE — 1126F PR PAIN SEVERITY QUANTIFIED, NO PAIN PRESENT: ICD-10-PCS | Mod: ,,, | Performed by: NURSE PRACTITIONER

## 2020-01-27 PROCEDURE — 1159F MED LIST DOCD IN RCRD: CPT | Mod: ,,, | Performed by: NURSE PRACTITIONER

## 2020-01-27 PROCEDURE — 70450 CT HEAD WITHOUT CONTRAST: ICD-10-PCS | Mod: 26,,, | Performed by: RADIOLOGY

## 2020-01-27 PROCEDURE — 70450 CT HEAD/BRAIN W/O DYE: CPT | Mod: 26,,, | Performed by: RADIOLOGY

## 2020-01-27 PROCEDURE — 1126F AMNT PAIN NOTED NONE PRSNT: CPT | Mod: ,,, | Performed by: NURSE PRACTITIONER

## 2020-01-27 PROCEDURE — 99999 PR PBB SHADOW E&M-EST. PATIENT-LVL IV: CPT | Mod: PBBFAC,,, | Performed by: NURSE PRACTITIONER

## 2020-01-27 PROCEDURE — 99214 PR OFFICE/OUTPT VISIT, EST, LEVL IV, 30-39 MIN: ICD-10-PCS | Mod: S$PBB,,, | Performed by: NURSE PRACTITIONER

## 2020-01-27 PROCEDURE — 99999 PR PBB SHADOW E&M-EST. PATIENT-LVL IV: ICD-10-PCS | Mod: PBBFAC,,, | Performed by: NURSE PRACTITIONER

## 2020-01-27 PROCEDURE — 99214 OFFICE O/P EST MOD 30 MIN: CPT | Mod: PBBFAC,PO | Performed by: NURSE PRACTITIONER

## 2020-01-27 PROCEDURE — 1159F PR MEDICATION LIST DOCUMENTED IN MEDICAL RECORD: ICD-10-PCS | Mod: ,,, | Performed by: NURSE PRACTITIONER

## 2020-01-27 PROCEDURE — 70450 CT HEAD/BRAIN W/O DYE: CPT | Mod: TC,PO

## 2020-01-27 NOTE — PROGRESS NOTES
Subjective:       Patient ID: Sean Main is a 79 y.o. male.    Chief Complaint: Nausea (symptoms started 2 weeks ago)    He reports not feeling well in his head - for the last week or 2.    States nothing to do with his stomach - more in his head  Band like pressure across forehead   + clear nasal drainage - but he has had this since his CABG many years ago.   + dizziness , has not taken any meds for this.     Nausea   This is a new problem. The current episode started 1 to 4 weeks ago. The problem occurs intermittently. The problem has been gradually worsening. Associated symptoms include anorexia and headaches. Pertinent negatives include no abdominal pain, arthralgias, change in bowel habit, chest pain, chills, congestion, coughing, diaphoresis, fatigue, fever, joint swelling, myalgias, nausea, neck pain, numbness, rash, sore throat, swollen glands, urinary symptoms, vertigo, visual change, vomiting or weakness. Associated symptoms comments: Nausea all day   Foggy feeling.   Headache    This is a new problem. The current episode started 1 to 4 weeks ago. The problem occurs intermittently. The problem has been gradually worsening. The pain is located in the frontal region. The pain does not radiate. The quality of the pain is described as dull. Associated symptoms include anorexia, dizziness, photophobia and rhinorrhea. Pertinent negatives include no abdominal pain, abnormal behavior, back pain, blurred vision, coughing, drainage, ear pain, eye pain, eye redness, eye watering, facial sweating, fever, hearing loss, insomnia, loss of balance, muscle aches, nausea, neck pain, numbness, phonophobia, scalp tenderness, seizures, sinus pressure, sore throat, swollen glands, tingling, tinnitus, visual change, vomiting, weakness or weight loss. Associated symptoms comments: Foggy . He has tried nothing for the symptoms.     Vitals:    01/27/20 0841   BP: 124/80   Pulse: (!) 54   Temp: 97.9 °F (36.6 °C)     Review  of Systems   Constitutional: Negative for activity change, appetite change, chills, diaphoresis, fatigue, fever and weight loss.   HENT: Positive for rhinorrhea. Negative for congestion, drooling, ear pain, hearing loss, postnasal drip, sinus pressure, sore throat, tinnitus and trouble swallowing.         Head pressure    Eyes: Positive for photophobia. Negative for blurred vision, pain, discharge and redness.   Respiratory: Negative.  Negative for apnea, cough, choking, chest tightness, shortness of breath, wheezing and stridor.    Cardiovascular: Negative.  Negative for chest pain and leg swelling.   Gastrointestinal: Positive for anorexia. Negative for abdominal pain, anal bleeding, blood in stool, change in bowel habit, constipation, diarrhea, nausea and vomiting.        Able to eat - just has not had much appetite    Endocrine: Positive for polydipsia and polyuria.        Due to BPH   Genitourinary: Negative.  Negative for decreased urine volume, difficulty urinating, dysuria, flank pain, genital sores, hematuria, penile pain, scrotal swelling, testicular pain and urgency.        Nocturia     Musculoskeletal: Negative.  Negative for arthralgias, back pain, gait problem, joint swelling, myalgias and neck pain.   Skin: Negative.  Negative for color change, pallor, rash and wound.   Allergic/Immunologic: Negative.    Neurological: Positive for dizziness and headaches. Negative for vertigo, tingling, seizures, weakness, numbness and loss of balance.        Pressure across forehead    Hematological: Negative.  Negative for adenopathy.   Psychiatric/Behavioral: Negative.  Negative for behavioral problems, confusion, hallucinations and sleep disturbance. The patient does not have insomnia and is not hyperactive.        Past Medical History:   Diagnosis Date    CAD (coronary artery disease)     Compression fracture of T12 vertebra     DDD (degenerative disc disease), lumbar     Diverticulosis     GERD  (gastroesophageal reflux disease)     Hemorrhoid     Hernia of unspecified site of abdominal cavity without mention of obstruction or gangrene     hiatal hernia    HLD (hyperlipidemia)     HTN (hypertension)        Objective:      Physical Exam   Constitutional: He is oriented to person, place, and time. He appears well-developed and well-nourished.   HENT:   Head: Normocephalic and atraumatic.   Right Ear: Hearing, tympanic membrane, external ear and ear canal normal.   Left Ear: Hearing, tympanic membrane, external ear and ear canal normal.   Nose: Nose normal. No mucosal edema or rhinorrhea. Right sinus exhibits no maxillary sinus tenderness and no frontal sinus tenderness. Left sinus exhibits no maxillary sinus tenderness and no frontal sinus tenderness.   Mouth/Throat: Uvula is midline, oropharynx is clear and moist and mucous membranes are normal.   Eyes: Pupils are equal, round, and reactive to light. Conjunctivae and EOM are normal.   Neck: Normal range of motion. Neck supple.   Cardiovascular: Normal rate, regular rhythm, normal heart sounds and intact distal pulses. Exam reveals no friction rub.   No murmur heard.  Pulmonary/Chest: Effort normal and breath sounds normal. No stridor. No respiratory distress. He has no wheezes.   Abdominal: Soft. Bowel sounds are normal.   Musculoskeletal: Normal range of motion. He exhibits no edema.   Neurological: He is alert and oriented to person, place, and time. He has normal strength. No cranial nerve deficit or sensory deficit.   Skin: Skin is warm and dry.   Psychiatric: He has a normal mood and affect. His behavior is normal. Judgment and thought content normal.   Nursing note and vitals reviewed.      Assessment:       1. Coronary artery disease involving coronary bypass graft of native heart without angina pectoris    2. Dyslipidemia    3. Essential hypertension    4. Gastroesophageal reflux disease, esophagitis presence not specified    5. Screening for  prostate cancer    6. Vitamin D deficiency    7. Pressure in head    8. Rhinorrhea    9. Dazed state        Plan:       Coronary artery disease involving coronary bypass graft of native heart without angina pectoris  -     Comprehensive metabolic panel; Future; Expected date: 01/27/2020  -     CBC auto differential; Future; Expected date: 01/27/2020  -     CT Head Without Contrast; Future; Expected date: 01/27/2020    Dyslipidemia  -     Lipid panel; Future; Expected date: 01/27/2020    Essential hypertension  -     Comprehensive metabolic panel; Future; Expected date: 01/27/2020  -     CT Head Without Contrast; Future; Expected date: 01/27/2020    Gastroesophageal reflux disease, esophagitis presence not specified  -     Comprehensive metabolic panel; Future; Expected date: 01/27/2020    Screening for prostate cancer  -     PSA, Screening; Future; Expected date: 01/27/2020    Vitamin D deficiency  -     Vitamin D; Future; Expected date: 01/27/2020    Pressure in head  -     CT Head Without Contrast; Future; Expected date: 01/27/2020    Rhinorrhea  -     CT Head Without Contrast; Future; Expected date: 01/27/2020    Dazed state            Will get labs and scan and call with results

## 2020-01-28 ENCOUNTER — TELEPHONE (OUTPATIENT)
Dept: FAMILY MEDICINE | Facility: CLINIC | Age: 80
End: 2020-01-28

## 2020-01-28 DIAGNOSIS — J32.4 CHRONIC PANSINUSITIS: Primary | ICD-10-CM

## 2020-01-28 RX ORDER — FLUTICASONE PROPIONATE 50 MCG
1 SPRAY, SUSPENSION (ML) NASAL DAILY
Qty: 16 G | Refills: 5 | Status: SHIPPED | OUTPATIENT
Start: 2020-01-28 | End: 2021-02-18

## 2020-01-28 RX ORDER — CEFDINIR 300 MG/1
300 CAPSULE ORAL 2 TIMES DAILY
Qty: 20 CAPSULE | Refills: 0 | Status: SHIPPED | OUTPATIENT
Start: 2020-01-28 | End: 2020-02-07

## 2020-01-28 NOTE — TELEPHONE ENCOUNTER
Please call him and tell him that all his labs look great - No concern for diabetes  Thyroid is normal.  Vitamin-D level is good.  Cholesterol is normal on the Lipitor that he is taking no other abnormalities.  His CT shows para nasal sinus disease. Want him to start on some Flonase.  And I am also going to give him an antibiotic to treat this.  Think this may explain some of the way he is feeling.  The antibiotic will be for 10 days complete all of it unless he is having any kind of side effects or issues.   Make sure he takes the antibiotic with food

## 2020-05-05 ENCOUNTER — PATIENT MESSAGE (OUTPATIENT)
Dept: ADMINISTRATIVE | Facility: HOSPITAL | Age: 80
End: 2020-05-05

## 2020-06-29 DIAGNOSIS — E78.5 HYPERLIPIDEMIA, UNSPECIFIED HYPERLIPIDEMIA TYPE: ICD-10-CM

## 2020-06-29 RX ORDER — ATORVASTATIN CALCIUM 10 MG/1
TABLET, FILM COATED ORAL
Qty: 45 TABLET | Refills: 3 | OUTPATIENT
Start: 2020-06-29

## 2020-06-29 NOTE — PROGRESS NOTES
Quick DC. Duplicate Request   Refill Authorization Note    is requesting a refill authorization.    Brief assessment and rationale for refill: QUICK DC: duplicate     Medication-related problems identified: Medication education needs         Medication reconciliation completed: No                          Comments:   Pended Medication(s)   Requested Prescriptions     Refused Prescriptions Disp Refills    atorvastatin (LIPITOR) 10 MG tablet 45 tablet 3     Sig: Take 1/2 tablet daily     Refused By: RAFAEL BELLE     Reason for Refusal: Duplicate        Duplicate Pended Encounter(s)/ Last Prescribed Details:    Ordering Provider: -- KIMBERLY #:  -- NPI:  --    Authorizing Provider: Kaiden Retana DO KIMBERLY #:  [id number] NPI:  [id number]    Ordering User:  Kaiden Retana DO            Diagnosis Association: Hyperlipidemia, unspecified hyperlipidemia type (E78.5)      Original Order:  atorvastatin (LIPITOR) 10 MG tablet [[id number]]      Pharmacy:  [address]   KIMBERLY #:  --   Pharmacy Comments: --          Fill quantity remaining: -- Fill quantity used: -- Next fill due: --       Outpatient Medication Detail     Disp Refills Start End    atorvastatin (LIPITOR) 10 MG tablet 45 tablet 0 6/24/2020     Sig: Take 1/2 (one-half) tablet by mouth once daily    Sent to pharmacy as: atorvastatin (LIPITOR) 10 MG tablet    E-Prescribing Status: Receipt confirmed by pharmacy (6/24/2020  5:39 PM CDT           Note composed:12:48 PM 06/29/2020

## 2020-06-29 NOTE — TELEPHONE ENCOUNTER
----- Message from Tawanda Nielsen sent at 6/29/2020 11:35 AM CDT -----  Contact: pt  Type: Needs Medical Advice    Who Called:  pt    Best Call Back Number: [phone]  Additional Information: pt needs a refill of the medication atorvastatin (LIPITOR) 10 MG tablet. Pt is not sure about the dosage tho and would like to discuss this before a refill is called in to the pharmacy. Please call to advise.

## 2020-09-14 NOTE — TELEPHONE ENCOUNTER
Care Due:                  Date            Visit Type   Department     Provider  --------------------------------------------------------------------------------                                MYCHART                              FOLLOWUP/OF  [facility] FAMILY  Last Visit: 07-      FICE VISIT   MEDICINE       JESSIE RYAN  Next Visit: None Scheduled  None         None Found                                                            Last  Test          Frequency    Reason                     Performed    Due Date  --------------------------------------------------------------------------------    Office Visit  12 months..  ezetimibe................  07- 07-    Powered by [employer]. Reference number: [id number]. 9/14/2020 10:48:49 AM   CDT

## 2020-09-16 RX ORDER — CARVEDILOL 3.12 MG/1
TABLET ORAL
Qty: 90 TABLET | Refills: 3 | Status: SHIPPED | OUTPATIENT
Start: 2020-09-16 | End: 2021-09-21

## 2020-09-16 NOTE — TELEPHONE ENCOUNTER
Provider Staff:     Please schedule patient for Annual    Thanks!  [facility]     Appointments  past 12m or future 3m with PCP    Date Provider   Last Visit   7/23/2019 Stevenson Garland MD   Next Visit   Visit date not found Stevenson Garland MD     Note composed:3:28 PM 09/16/2020

## 2020-09-16 NOTE — PROGRESS NOTES
Refill Routing Note   Medication(s) are not appropriate for processing by [facility]:       - Drug-Disease Interaction ( Bradycardia)     Will follow up with your staff to schedule appointment after your decision.    Medication-related problems identified:   Drug-disease interaction  Requires appointment  Medication Therapy Plan: CDMR. NEEDS APPT(ANNUAL); DDzi( Bradycardia), DEFER TO YOU  Medication reconciliation completed: No      Automatic Epic Generated Protocol Data:        Requested Prescriptions   Pending Prescriptions Disp Refills    carvediloL (COREG) 3.125 MG tablet [Pharmacy Med Name: Carvedilol 3.125 MG Oral Tablet] 90 tablet 0     Sig: TAKE 1/2 (ONE-HALF) TABLET BY MOUTH TWICE DAILY WITH FOOD       Cardiovascular:  Beta Blockers Passed - 9/14/2020 10:48 AM        Passed - Patient is at least 18 years old        Passed - Last BP in normal range within 360 days.     BP Readings from Last 3 Encounters:   01/27/20 124/80   07/23/19 118/78   11/05/18 138/80              Passed - Last Heart Rate in normal range within 360 days.     Pulse Readings from Last 3 Encounters:   01/27/20 54   07/23/19 44   11/05/18 53             Passed - Office visit in past 12 months or future 90 days.     Recent Outpatient Visits            7 months ago Coronary artery disease involving coronary bypass graft of native heart without angina pectoris    [facility] Lesa Mari NP    1 year ago Lumbar sprain, initial encounter    [facility] Stevenson Garland MD    1 year ago Dizziness    [facility] Lesa Mari NP    2 years ago Vitamin D deficiency    [facility] Stevenson Garland MD    2 years ago Spondylolisthesis of lumbar region    [facility] Sujatha Mcleod PA-C                          Appointments  past 12m or future 3m with PCP    Date Provider   Last Visit   7/23/2019 Stevenson Garland MD   Next Visit    Visit date not found Stevenson Garland MD   ED visits in past 90 days: 0     Note composed:7:56 AM 09/16/2020

## 2020-09-29 ENCOUNTER — PATIENT MESSAGE (OUTPATIENT)
Dept: OTHER | Facility: OTHER | Age: 80
End: 2020-09-29

## 2020-09-29 DIAGNOSIS — E78.5 DYSLIPIDEMIA: Chronic | ICD-10-CM

## 2020-09-29 NOTE — TELEPHONE ENCOUNTER
No new care gaps identified.  Powered by [employer]. Reference number: [id number]. 9/29/2020 9:06:06 AM BROOKE

## 2020-09-30 RX ORDER — EZETIMIBE 10 MG/1
TABLET ORAL
Qty: 90 TABLET | Refills: 0 | Status: SHIPPED | OUTPATIENT
Start: 2020-09-30 | End: 2020-12-29

## 2020-09-30 NOTE — PROGRESS NOTES
Refill Authorization Note   Sean Main is requesting a refill authorization.     Brief assessment and rationale for refill: APPROVE: NEED APPT(ANNUAL)     Medication-related problems identified: Requires appointment    Medication Therapy Plan: CDMR. NEEDS APPT(ANNUAL); APPROVE PER PROTOCOL    Medication reconciliation completed: No                    Comments:          Requested Prescriptions   Pending Prescriptions Disp Refills    ezetimibe (ZETIA) 10 mg tablet [Pharmacy Med Name: Ezetimibe 10 MG Oral Tablet] 90 tablet 0     Sig: Take 1 tablet by mouth once daily       Cardiovascular:  Antilipid - Sterol Transport Inhibitors Passed - 9/29/2020  9:05 AM        Passed - Patient is at least 18 years old        Passed - Office Visit within last 12 months or future 90 days.     Recent Outpatient Visits            8 months ago Coronary artery disease involving coronary bypass graft of native heart without angina pectoris    [facility] Lesa Mari NP    1 year ago Lumbar sprain, initial encounter    [facility] Stevenson Garland MD    1 year ago Dizziness    [facility] Lesa Mari NP    2 years ago Vitamin D deficiency    [facility] Stevenson Garland MD    2 years ago Spondylolisthesis of lumbar region    [facility] Sujatha Mcleod PA-C                    Passed - Total Cholesterol within 360 days     Cholesterol   Date Value Ref Range Status   01/27/2020 155 120 - 199 mg/dL Final     Comment:     The National Cholesterol Education Program (NCEP) has set the  following guidelines (reference ranges) for Cholesterol:  Optimal.....................<200 mg/dL  Borderline High.............200-239 mg/dL  High........................> or = 240 mg/dL     10/16/2018 152 120 - 199 mg/dL Final     Comment:     The National Cholesterol Education Program (NCEP) has set the  following guidelines (reference ranges) for  Cholesterol:  Optimal.....................<200 mg/dL  Borderline High.............200-239 mg/dL  High........................> or = 240 mg/dL     09/28/2017 151 120 - 199 mg/dL Final     Comment:     The National Cholesterol Education Program (NCEP) has set the  following guidelines (reference ranges) for Cholesterol:  Optimal.....................<200 mg/dL  Borderline High.............200-239 mg/dL  High........................> or = 240 mg/dL                Passed - LDL within 360 days     LDL Cholesterol   Date Value Ref Range Status   01/27/2020 73.2 63.0 - 159.0 mg/dL Final     Comment:     The National Cholesterol Education Program (NCEP) has set the  following guidelines (reference values) for LDL Cholesterol:  Optimal.......................<130 mg/dL  Borderline High...............130-159 mg/dL  High..........................160-189 mg/dL  Very High.....................>190 mg/dL              Passed - HDL within 360 days     HDL   Date Value Ref Range Status   01/27/2020 56 40 - 75 mg/dL Final     Comment:     The National Cholesterol Education Program (NCEP) has set the  following guidelines (reference values) for HDL Cholesterol:  Low...............<40 mg/dL  Optimal...........>60 mg/dL              Passed - Triglycerides within 360 days     Triglycerides   Date Value Ref Range Status   01/27/2020 129 30 - 150 mg/dL Final     Comment:     The National Cholesterol Education Program (NCEP) has set the  following guidelines (reference values) for triglycerides:  Normal......................<150 mg/dL  Borderline High.............150-199 mg/dL  High........................200-499 mg/dL     10/16/2018 131 30 - 150 mg/dL Final     Comment:     The National Cholesterol Education Program (NCEP) has set the  following guidelines (reference values) for triglycerides:  Normal......................<150 mg/dL  Borderline High.............150-199 mg/dL  High........................200-499 mg/dL     09/28/2017 150 30 -  150 mg/dL Final     Comment:     The National Cholesterol Education Program (NCEP) has set the  following guidelines (reference values) for triglycerides:  Normal......................<150 mg/dL  Borderline High.............150-199 mg/dL  High........................200-499 mg/dL                    Appointments  past 12m or future 3m with PCP    Date Provider   Last Visit   7/23/2019 Stevenson Garland MD   Next Visit   Visit date not found Stevenson Garland MD   ED visits in past 90 days: 0     Note composed:11:26 AM 09/30/2020

## 2020-12-11 ENCOUNTER — PATIENT MESSAGE (OUTPATIENT)
Dept: OTHER | Facility: OTHER | Age: 80
End: 2020-12-11

## 2021-02-16 DIAGNOSIS — K21.9 GASTROESOPHAGEAL REFLUX DISEASE: ICD-10-CM

## 2021-02-16 DIAGNOSIS — E78.5 DYSLIPIDEMIA: Primary | ICD-10-CM

## 2021-02-18 RX ORDER — PANTOPRAZOLE SODIUM 40 MG/1
TABLET, DELAYED RELEASE ORAL
Qty: 90 TABLET | Refills: 0 | Status: SHIPPED | OUTPATIENT
Start: 2021-02-18 | End: 2021-05-26

## 2021-02-24 RX ORDER — AMLODIPINE BESYLATE 5 MG/1
TABLET ORAL
Qty: 90 TABLET | Refills: 4 | Status: SHIPPED | OUTPATIENT
Start: 2021-02-24 | End: 2022-03-07 | Stop reason: SDUPTHER

## 2021-02-26 ENCOUNTER — OFFICE VISIT (OUTPATIENT)
Dept: FAMILY MEDICINE | Facility: CLINIC | Age: 81
End: 2021-02-26
Payer: MEDICARE

## 2021-02-26 ENCOUNTER — LAB VISIT (OUTPATIENT)
Dept: LAB | Facility: HOSPITAL | Age: 81
End: 2021-02-26
Attending: FAMILY MEDICINE
Payer: MEDICARE

## 2021-02-26 VITALS
BODY MASS INDEX: 24.85 KG/M2 | DIASTOLIC BLOOD PRESSURE: 70 MMHG | TEMPERATURE: 98 F | HEIGHT: 69 IN | RESPIRATION RATE: 18 BRPM | WEIGHT: 167.75 LBS | SYSTOLIC BLOOD PRESSURE: 138 MMHG | HEART RATE: 66 BPM

## 2021-02-26 DIAGNOSIS — K21.9 GASTROESOPHAGEAL REFLUX DISEASE WITHOUT ESOPHAGITIS: ICD-10-CM

## 2021-02-26 DIAGNOSIS — I25.810 CORONARY ARTERY DISEASE INVOLVING CORONARY BYPASS GRAFT OF NATIVE HEART WITHOUT ANGINA PECTORIS: ICD-10-CM

## 2021-02-26 DIAGNOSIS — I10 ESSENTIAL HYPERTENSION: Primary | ICD-10-CM

## 2021-02-26 DIAGNOSIS — E78.5 DYSLIPIDEMIA: ICD-10-CM

## 2021-02-26 PROCEDURE — 99214 OFFICE O/P EST MOD 30 MIN: CPT | Mod: S$PBB,,, | Performed by: FAMILY MEDICINE

## 2021-02-26 PROCEDURE — 99214 PR OFFICE/OUTPT VISIT, EST, LEVL IV, 30-39 MIN: ICD-10-PCS | Mod: S$PBB,,, | Performed by: FAMILY MEDICINE

## 2021-02-26 PROCEDURE — 99999 PR PBB SHADOW E&M-EST. PATIENT-LVL III: CPT | Mod: PBBFAC,,, | Performed by: FAMILY MEDICINE

## 2021-02-26 PROCEDURE — 36415 COLL VENOUS BLD VENIPUNCTURE: CPT | Mod: PO

## 2021-02-26 PROCEDURE — 80061 LIPID PANEL: CPT

## 2021-02-26 PROCEDURE — 99999 PR PBB SHADOW E&M-EST. PATIENT-LVL III: ICD-10-PCS | Mod: PBBFAC,,, | Performed by: FAMILY MEDICINE

## 2021-02-26 PROCEDURE — 99213 OFFICE O/P EST LOW 20 MIN: CPT | Mod: PBBFAC,PO | Performed by: FAMILY MEDICINE

## 2021-02-26 PROCEDURE — 80053 COMPREHEN METABOLIC PANEL: CPT

## 2021-02-27 LAB
ALBUMIN SERPL BCP-MCNC: 4.1 G/DL (ref 3.5–5.2)
ALP SERPL-CCNC: 83 U/L (ref 55–135)
ALT SERPL W/O P-5'-P-CCNC: 38 U/L (ref 10–44)
ANION GAP SERPL CALC-SCNC: 10 MMOL/L (ref 8–16)
AST SERPL-CCNC: 33 U/L (ref 10–40)
BILIRUB SERPL-MCNC: 0.9 MG/DL (ref 0.1–1)
BUN SERPL-MCNC: 12 MG/DL (ref 8–23)
CALCIUM SERPL-MCNC: 9.4 MG/DL (ref 8.7–10.5)
CHLORIDE SERPL-SCNC: 107 MMOL/L (ref 95–110)
CHOLEST SERPL-MCNC: 134 MG/DL (ref 120–199)
CHOLEST/HDLC SERPL: 2.7 {RATIO} (ref 2–5)
CO2 SERPL-SCNC: 27 MMOL/L (ref 23–29)
CREAT SERPL-MCNC: 0.9 MG/DL (ref 0.5–1.4)
EST. GFR  (AFRICAN AMERICAN): >60 ML/MIN/1.73 M^2
EST. GFR  (NON AFRICAN AMERICAN): >60 ML/MIN/1.73 M^2
GLUCOSE SERPL-MCNC: 99 MG/DL (ref 70–110)
HDLC SERPL-MCNC: 49 MG/DL (ref 40–75)
HDLC SERPL: 36.6 % (ref 20–50)
LDLC SERPL CALC-MCNC: 61 MG/DL (ref 63–159)
NONHDLC SERPL-MCNC: 85 MG/DL
POTASSIUM SERPL-SCNC: 5.2 MMOL/L (ref 3.5–5.1)
PROT SERPL-MCNC: 7.1 G/DL (ref 6–8.4)
SODIUM SERPL-SCNC: 144 MMOL/L (ref 136–145)
TRIGL SERPL-MCNC: 120 MG/DL (ref 30–150)

## 2021-03-22 DIAGNOSIS — E78.5 DYSLIPIDEMIA: Chronic | ICD-10-CM

## 2021-03-24 RX ORDER — EZETIMIBE 10 MG/1
TABLET ORAL
Qty: 90 TABLET | Refills: 3 | Status: SHIPPED | OUTPATIENT
Start: 2021-03-24 | End: 2022-03-07 | Stop reason: SDUPTHER

## 2021-04-30 ENCOUNTER — EXTERNAL CHRONIC CARE MANAGEMENT (OUTPATIENT)
Dept: PRIMARY CARE CLINIC | Facility: CLINIC | Age: 81
End: 2021-04-30
Payer: MEDICARE

## 2021-04-30 PROCEDURE — 99490 PR CHRONIC CARE MGMT, 1ST 20 MIN: ICD-10-PCS | Mod: S$PBB,,, | Performed by: FAMILY MEDICINE

## 2021-04-30 PROCEDURE — 99490 CHRNC CARE MGMT STAFF 1ST 20: CPT | Mod: S$PBB,,, | Performed by: FAMILY MEDICINE

## 2021-04-30 PROCEDURE — 99490 CHRNC CARE MGMT STAFF 1ST 20: CPT | Mod: PBBFAC,PO | Performed by: FAMILY MEDICINE

## 2021-05-22 DIAGNOSIS — K21.9 GASTROESOPHAGEAL REFLUX DISEASE: ICD-10-CM

## 2021-05-26 RX ORDER — PANTOPRAZOLE SODIUM 40 MG/1
TABLET, DELAYED RELEASE ORAL
Qty: 90 TABLET | Refills: 3 | Status: SHIPPED | OUTPATIENT
Start: 2021-05-26 | End: 2022-08-15

## 2021-05-31 ENCOUNTER — EXTERNAL CHRONIC CARE MANAGEMENT (OUTPATIENT)
Dept: PRIMARY CARE CLINIC | Facility: CLINIC | Age: 81
End: 2021-05-31
Payer: MEDICARE

## 2021-05-31 PROCEDURE — 99490 PR CHRONIC CARE MGMT, 1ST 20 MIN: ICD-10-PCS | Mod: S$PBB,,, | Performed by: FAMILY MEDICINE

## 2021-05-31 PROCEDURE — 99490 CHRNC CARE MGMT STAFF 1ST 20: CPT | Mod: PBBFAC,PO | Performed by: FAMILY MEDICINE

## 2021-05-31 PROCEDURE — 99490 CHRNC CARE MGMT STAFF 1ST 20: CPT | Mod: S$PBB,,, | Performed by: FAMILY MEDICINE

## 2021-06-30 ENCOUNTER — EXTERNAL CHRONIC CARE MANAGEMENT (OUTPATIENT)
Dept: PRIMARY CARE CLINIC | Facility: CLINIC | Age: 81
End: 2021-06-30
Payer: MEDICARE

## 2021-06-30 PROCEDURE — 99490 CHRNC CARE MGMT STAFF 1ST 20: CPT | Mod: PBBFAC,PO | Performed by: FAMILY MEDICINE

## 2021-06-30 PROCEDURE — 99490 CHRNC CARE MGMT STAFF 1ST 20: CPT | Mod: S$PBB,,, | Performed by: FAMILY MEDICINE

## 2021-06-30 PROCEDURE — 99490 PR CHRONIC CARE MGMT, 1ST 20 MIN: ICD-10-PCS | Mod: S$PBB,,, | Performed by: FAMILY MEDICINE

## 2021-07-31 ENCOUNTER — EXTERNAL CHRONIC CARE MANAGEMENT (OUTPATIENT)
Dept: PRIMARY CARE CLINIC | Facility: CLINIC | Age: 81
End: 2021-07-31
Payer: MEDICARE

## 2021-07-31 PROCEDURE — 99490 PR CHRONIC CARE MGMT, 1ST 20 MIN: ICD-10-PCS | Mod: S$PBB,,, | Performed by: FAMILY MEDICINE

## 2021-07-31 PROCEDURE — 99490 CHRNC CARE MGMT STAFF 1ST 20: CPT | Mod: PBBFAC,PO | Performed by: FAMILY MEDICINE

## 2021-07-31 PROCEDURE — 99490 CHRNC CARE MGMT STAFF 1ST 20: CPT | Mod: S$PBB,,, | Performed by: FAMILY MEDICINE

## 2021-08-31 ENCOUNTER — EXTERNAL CHRONIC CARE MANAGEMENT (OUTPATIENT)
Dept: PRIMARY CARE CLINIC | Facility: CLINIC | Age: 81
End: 2021-08-31
Payer: MEDICARE

## 2021-08-31 PROCEDURE — 99490 CHRNC CARE MGMT STAFF 1ST 20: CPT | Mod: S$PBB,,, | Performed by: FAMILY MEDICINE

## 2021-08-31 PROCEDURE — 99490 CHRNC CARE MGMT STAFF 1ST 20: CPT | Mod: PBBFAC,25,PO | Performed by: FAMILY MEDICINE

## 2021-08-31 PROCEDURE — 99439 CHRNC CARE MGMT STAF EA ADDL: CPT | Mod: S$PBB,,, | Performed by: FAMILY MEDICINE

## 2021-08-31 PROCEDURE — 99439 PR CHRONIC CARE MGMT, EA ADDTL 20 MIN: ICD-10-PCS | Mod: S$PBB,,, | Performed by: FAMILY MEDICINE

## 2021-08-31 PROCEDURE — 99439 CHRNC CARE MGMT STAF EA ADDL: CPT | Mod: PBBFAC,PO | Performed by: FAMILY MEDICINE

## 2021-08-31 PROCEDURE — 99490 PR CHRONIC CARE MGMT, 1ST 20 MIN: ICD-10-PCS | Mod: S$PBB,,, | Performed by: FAMILY MEDICINE

## 2021-09-18 DIAGNOSIS — E78.5 HYPERLIPIDEMIA, UNSPECIFIED HYPERLIPIDEMIA TYPE: ICD-10-CM

## 2021-09-21 RX ORDER — ATORVASTATIN CALCIUM 10 MG/1
TABLET, FILM COATED ORAL
Qty: 45 TABLET | Refills: 1 | Status: SHIPPED | OUTPATIENT
Start: 2021-09-21 | End: 2022-03-07 | Stop reason: SDUPTHER

## 2021-09-21 RX ORDER — CARVEDILOL 3.12 MG/1
TABLET ORAL
Qty: 90 TABLET | Refills: 1 | Status: SHIPPED | OUTPATIENT
Start: 2021-09-21 | End: 2022-03-07 | Stop reason: SDUPTHER

## 2021-09-30 ENCOUNTER — EXTERNAL CHRONIC CARE MANAGEMENT (OUTPATIENT)
Dept: PRIMARY CARE CLINIC | Facility: CLINIC | Age: 81
End: 2021-09-30
Payer: MEDICARE

## 2021-09-30 PROCEDURE — 99490 CHRNC CARE MGMT STAFF 1ST 20: CPT | Mod: S$PBB,,, | Performed by: FAMILY MEDICINE

## 2021-09-30 PROCEDURE — 99490 PR CHRONIC CARE MGMT, 1ST 20 MIN: ICD-10-PCS | Mod: S$PBB,,, | Performed by: FAMILY MEDICINE

## 2021-09-30 PROCEDURE — 99490 CHRNC CARE MGMT STAFF 1ST 20: CPT | Mod: PBBFAC,PO | Performed by: FAMILY MEDICINE

## 2021-10-31 ENCOUNTER — EXTERNAL CHRONIC CARE MANAGEMENT (OUTPATIENT)
Dept: PRIMARY CARE CLINIC | Facility: CLINIC | Age: 81
End: 2021-10-31
Payer: MEDICARE

## 2021-10-31 PROCEDURE — 99490 PR CHRONIC CARE MGMT, 1ST 20 MIN: ICD-10-PCS | Mod: S$PBB,,, | Performed by: FAMILY MEDICINE

## 2021-10-31 PROCEDURE — 99490 CHRNC CARE MGMT STAFF 1ST 20: CPT | Mod: PBBFAC,PO | Performed by: FAMILY MEDICINE

## 2021-10-31 PROCEDURE — 99490 CHRNC CARE MGMT STAFF 1ST 20: CPT | Mod: S$PBB,,, | Performed by: FAMILY MEDICINE

## 2021-11-30 ENCOUNTER — EXTERNAL CHRONIC CARE MANAGEMENT (OUTPATIENT)
Dept: PRIMARY CARE CLINIC | Facility: CLINIC | Age: 81
End: 2021-11-30
Payer: MEDICARE

## 2021-11-30 ENCOUNTER — TELEPHONE (OUTPATIENT)
Dept: FAMILY MEDICINE | Facility: CLINIC | Age: 81
End: 2021-11-30
Payer: MEDICARE

## 2021-11-30 PROCEDURE — 99439 CHRNC CARE MGMT STAF EA ADDL: CPT | Mod: PBBFAC,PO | Performed by: FAMILY MEDICINE

## 2021-11-30 PROCEDURE — 99490 CHRNC CARE MGMT STAFF 1ST 20: CPT | Mod: PBBFAC,25,PO | Performed by: FAMILY MEDICINE

## 2021-11-30 PROCEDURE — 99439 PR CHRONIC CARE MGMT, EA ADDTL 20 MIN: ICD-10-PCS | Mod: S$PBB,,, | Performed by: FAMILY MEDICINE

## 2021-11-30 PROCEDURE — 99490 CHRNC CARE MGMT STAFF 1ST 20: CPT | Mod: S$PBB,,, | Performed by: FAMILY MEDICINE

## 2021-11-30 PROCEDURE — 99490 PR CHRONIC CARE MGMT, 1ST 20 MIN: ICD-10-PCS | Mod: S$PBB,,, | Performed by: FAMILY MEDICINE

## 2021-11-30 PROCEDURE — 99439 CHRNC CARE MGMT STAF EA ADDL: CPT | Mod: S$PBB,,, | Performed by: FAMILY MEDICINE

## 2021-12-02 ENCOUNTER — LAB VISIT (OUTPATIENT)
Dept: LAB | Facility: HOSPITAL | Age: 81
End: 2021-12-02
Attending: NURSE PRACTITIONER
Payer: MEDICARE

## 2021-12-02 ENCOUNTER — OFFICE VISIT (OUTPATIENT)
Dept: FAMILY MEDICINE | Facility: CLINIC | Age: 81
End: 2021-12-02
Payer: MEDICARE

## 2021-12-02 VITALS
HEIGHT: 69 IN | TEMPERATURE: 98 F | WEIGHT: 163.13 LBS | HEART RATE: 51 BPM | OXYGEN SATURATION: 98 % | BODY MASS INDEX: 24.16 KG/M2 | DIASTOLIC BLOOD PRESSURE: 78 MMHG | SYSTOLIC BLOOD PRESSURE: 130 MMHG

## 2021-12-02 DIAGNOSIS — R63.1 INCREASED THIRST: Primary | ICD-10-CM

## 2021-12-02 DIAGNOSIS — R63.1 INCREASED THIRST: ICD-10-CM

## 2021-12-02 DIAGNOSIS — R35.1 NOCTURIA: ICD-10-CM

## 2021-12-02 LAB
ALBUMIN SERPL BCP-MCNC: 4 G/DL (ref 3.5–5.2)
ALP SERPL-CCNC: 81 U/L (ref 55–135)
ALT SERPL W/O P-5'-P-CCNC: 31 U/L (ref 10–44)
ANION GAP SERPL CALC-SCNC: 9 MMOL/L (ref 8–16)
AST SERPL-CCNC: 32 U/L (ref 10–40)
BILIRUB SERPL-MCNC: 0.8 MG/DL (ref 0.1–1)
BILIRUB UR QL STRIP: NEGATIVE
BUN SERPL-MCNC: 13 MG/DL (ref 8–23)
CALCIUM SERPL-MCNC: 10.1 MG/DL (ref 8.7–10.5)
CHLORIDE SERPL-SCNC: 106 MMOL/L (ref 95–110)
CLARITY UR: CLEAR
CO2 SERPL-SCNC: 25 MMOL/L (ref 23–29)
COLOR UR: YELLOW
CREAT SERPL-MCNC: 0.9 MG/DL (ref 0.5–1.4)
EST. GFR  (AFRICAN AMERICAN): >60 ML/MIN/1.73 M^2
EST. GFR  (NON AFRICAN AMERICAN): >60 ML/MIN/1.73 M^2
ESTIMATED AVG GLUCOSE: 111 MG/DL (ref 68–131)
GLUCOSE SERPL-MCNC: 116 MG/DL (ref 70–110)
GLUCOSE UR QL STRIP: NEGATIVE
HBA1C MFR BLD: 5.5 % (ref 4–5.6)
HGB UR QL STRIP: NEGATIVE
KETONES UR QL STRIP: NEGATIVE
LEUKOCYTE ESTERASE UR QL STRIP: NEGATIVE
NITRITE UR QL STRIP: NEGATIVE
PH UR STRIP: 6 [PH] (ref 5–8)
POTASSIUM SERPL-SCNC: 6.1 MMOL/L (ref 3.5–5.1)
PROT SERPL-MCNC: 7.1 G/DL (ref 6–8.4)
PROT UR QL STRIP: NEGATIVE
SODIUM SERPL-SCNC: 140 MMOL/L (ref 136–145)
SP GR UR STRIP: 1.01 (ref 1–1.03)
URN SPEC COLLECT METH UR: NORMAL

## 2021-12-02 PROCEDURE — 80053 COMPREHEN METABOLIC PANEL: CPT | Performed by: NURSE PRACTITIONER

## 2021-12-02 PROCEDURE — 99999 PR PBB SHADOW E&M-EST. PATIENT-LVL IV: CPT | Mod: PBBFAC,,, | Performed by: NURSE PRACTITIONER

## 2021-12-02 PROCEDURE — 99214 OFFICE O/P EST MOD 30 MIN: CPT | Mod: PBBFAC,PO | Performed by: NURSE PRACTITIONER

## 2021-12-02 PROCEDURE — 99214 PR OFFICE/OUTPT VISIT, EST, LEVL IV, 30-39 MIN: ICD-10-PCS | Mod: S$PBB,,, | Performed by: NURSE PRACTITIONER

## 2021-12-02 PROCEDURE — 99999 PR PBB SHADOW E&M-EST. PATIENT-LVL IV: ICD-10-PCS | Mod: PBBFAC,,, | Performed by: NURSE PRACTITIONER

## 2021-12-02 PROCEDURE — 83036 HEMOGLOBIN GLYCOSYLATED A1C: CPT | Mod: GA | Performed by: NURSE PRACTITIONER

## 2021-12-02 PROCEDURE — 81003 URINALYSIS AUTO W/O SCOPE: CPT | Mod: PO | Performed by: NURSE PRACTITIONER

## 2021-12-02 PROCEDURE — 99214 OFFICE O/P EST MOD 30 MIN: CPT | Mod: S$PBB,,, | Performed by: NURSE PRACTITIONER

## 2021-12-02 PROCEDURE — 36415 COLL VENOUS BLD VENIPUNCTURE: CPT | Mod: PO | Performed by: NURSE PRACTITIONER

## 2021-12-03 ENCOUNTER — TELEPHONE (OUTPATIENT)
Dept: FAMILY MEDICINE | Facility: CLINIC | Age: 81
End: 2021-12-03
Payer: MEDICARE

## 2021-12-03 DIAGNOSIS — E87.5 SERUM POTASSIUM ELEVATED: Primary | ICD-10-CM

## 2021-12-08 DIAGNOSIS — E87.5 SERUM POTASSIUM ELEVATED: Primary | ICD-10-CM

## 2021-12-13 ENCOUNTER — LAB VISIT (OUTPATIENT)
Dept: LAB | Facility: HOSPITAL | Age: 81
End: 2021-12-13
Attending: INTERNAL MEDICINE
Payer: MEDICARE

## 2021-12-13 DIAGNOSIS — E87.5 SERUM POTASSIUM ELEVATED: ICD-10-CM

## 2021-12-13 LAB — POTASSIUM SERPL-SCNC: 4.7 MMOL/L (ref 3.5–5.1)

## 2021-12-13 PROCEDURE — 36415 COLL VENOUS BLD VENIPUNCTURE: CPT | Mod: PO | Performed by: NURSE PRACTITIONER

## 2021-12-13 PROCEDURE — 84132 ASSAY OF SERUM POTASSIUM: CPT | Performed by: NURSE PRACTITIONER

## 2021-12-31 ENCOUNTER — EXTERNAL CHRONIC CARE MANAGEMENT (OUTPATIENT)
Dept: PRIMARY CARE CLINIC | Facility: CLINIC | Age: 81
End: 2021-12-31
Payer: MEDICARE

## 2021-12-31 PROCEDURE — 99439 CHRNC CARE MGMT STAF EA ADDL: CPT | Mod: S$PBB,,, | Performed by: FAMILY MEDICINE

## 2021-12-31 PROCEDURE — 99490 CHRNC CARE MGMT STAFF 1ST 20: CPT | Mod: PBBFAC,PO | Performed by: FAMILY MEDICINE

## 2021-12-31 PROCEDURE — 99490 CHRNC CARE MGMT STAFF 1ST 20: CPT | Mod: S$PBB,,, | Performed by: FAMILY MEDICINE

## 2021-12-31 PROCEDURE — 99490 PR CHRONIC CARE MGMT, 1ST 20 MIN: ICD-10-PCS | Mod: S$PBB,,, | Performed by: FAMILY MEDICINE

## 2021-12-31 PROCEDURE — 99439 PR CHRONIC CARE MGMT, EA ADDTL 20 MIN: ICD-10-PCS | Mod: S$PBB,,, | Performed by: FAMILY MEDICINE

## 2021-12-31 PROCEDURE — 99439 CHRNC CARE MGMT STAF EA ADDL: CPT | Mod: PBBFAC,27,PO | Performed by: FAMILY MEDICINE

## 2022-01-31 ENCOUNTER — EXTERNAL CHRONIC CARE MANAGEMENT (OUTPATIENT)
Dept: PRIMARY CARE CLINIC | Facility: CLINIC | Age: 82
End: 2022-01-31
Payer: MEDICARE

## 2022-01-31 PROCEDURE — 99490 CHRNC CARE MGMT STAFF 1ST 20: CPT | Mod: S$PBB,,, | Performed by: FAMILY MEDICINE

## 2022-01-31 PROCEDURE — 99490 CHRNC CARE MGMT STAFF 1ST 20: CPT | Mod: PBBFAC,PO | Performed by: FAMILY MEDICINE

## 2022-01-31 PROCEDURE — 99490 PR CHRONIC CARE MGMT, 1ST 20 MIN: ICD-10-PCS | Mod: S$PBB,,, | Performed by: FAMILY MEDICINE

## 2022-02-28 ENCOUNTER — EXTERNAL CHRONIC CARE MANAGEMENT (OUTPATIENT)
Dept: PRIMARY CARE CLINIC | Facility: CLINIC | Age: 82
End: 2022-02-28
Payer: MEDICARE

## 2022-02-28 PROCEDURE — 99490 CHRNC CARE MGMT STAFF 1ST 20: CPT | Mod: S$PBB,,, | Performed by: FAMILY MEDICINE

## 2022-02-28 PROCEDURE — 99490 PR CHRONIC CARE MGMT, 1ST 20 MIN: ICD-10-PCS | Mod: S$PBB,,, | Performed by: FAMILY MEDICINE

## 2022-02-28 PROCEDURE — 99490 CHRNC CARE MGMT STAFF 1ST 20: CPT | Mod: PBBFAC,PO | Performed by: FAMILY MEDICINE

## 2022-03-07 ENCOUNTER — LAB VISIT (OUTPATIENT)
Dept: LAB | Facility: HOSPITAL | Age: 82
End: 2022-03-07
Attending: FAMILY MEDICINE
Payer: MEDICARE

## 2022-03-07 ENCOUNTER — OFFICE VISIT (OUTPATIENT)
Dept: FAMILY MEDICINE | Facility: CLINIC | Age: 82
End: 2022-03-07
Payer: MEDICARE

## 2022-03-07 VITALS
SYSTOLIC BLOOD PRESSURE: 112 MMHG | OXYGEN SATURATION: 97 % | DIASTOLIC BLOOD PRESSURE: 70 MMHG | TEMPERATURE: 99 F | HEART RATE: 52 BPM | WEIGHT: 161.63 LBS | HEIGHT: 69 IN | RESPIRATION RATE: 18 BRPM | BODY MASS INDEX: 23.94 KG/M2

## 2022-03-07 DIAGNOSIS — I10 ESSENTIAL HYPERTENSION: Primary | ICD-10-CM

## 2022-03-07 DIAGNOSIS — Z12.5 PROSTATE CANCER SCREENING: ICD-10-CM

## 2022-03-07 DIAGNOSIS — E78.5 HYPERLIPIDEMIA, UNSPECIFIED HYPERLIPIDEMIA TYPE: ICD-10-CM

## 2022-03-07 DIAGNOSIS — E78.5 DYSLIPIDEMIA: ICD-10-CM

## 2022-03-07 DIAGNOSIS — I25.810 CORONARY ARTERY DISEASE INVOLVING CORONARY BYPASS GRAFT OF NATIVE HEART WITHOUT ANGINA PECTORIS: ICD-10-CM

## 2022-03-07 DIAGNOSIS — K21.9 GASTROESOPHAGEAL REFLUX DISEASE WITHOUT ESOPHAGITIS: ICD-10-CM

## 2022-03-07 LAB
ALBUMIN SERPL BCP-MCNC: 4.2 G/DL (ref 3.5–5.2)
ALP SERPL-CCNC: 80 U/L (ref 55–135)
ALT SERPL W/O P-5'-P-CCNC: 25 U/L (ref 10–44)
ANION GAP SERPL CALC-SCNC: 9 MMOL/L (ref 8–16)
AST SERPL-CCNC: 49 U/L (ref 10–40)
BILIRUB SERPL-MCNC: 0.7 MG/DL (ref 0.1–1)
BUN SERPL-MCNC: 13 MG/DL (ref 8–23)
CALCIUM SERPL-MCNC: 9.8 MG/DL (ref 8.7–10.5)
CHLORIDE SERPL-SCNC: 106 MMOL/L (ref 95–110)
CHOLEST SERPL-MCNC: 141 MG/DL (ref 120–199)
CHOLEST/HDLC SERPL: 2.5 {RATIO} (ref 2–5)
CO2 SERPL-SCNC: 28 MMOL/L (ref 23–29)
COMPLEXED PSA SERPL-MCNC: 2 NG/ML (ref 0–4)
CREAT SERPL-MCNC: 1 MG/DL (ref 0.5–1.4)
EST. GFR  (AFRICAN AMERICAN): >60 ML/MIN/1.73 M^2
EST. GFR  (NON AFRICAN AMERICAN): >60 ML/MIN/1.73 M^2
GLUCOSE SERPL-MCNC: 105 MG/DL (ref 70–110)
HDLC SERPL-MCNC: 56 MG/DL (ref 40–75)
HDLC SERPL: 39.7 % (ref 20–50)
LDLC SERPL CALC-MCNC: 54.6 MG/DL (ref 63–159)
NONHDLC SERPL-MCNC: 85 MG/DL
POTASSIUM SERPL-SCNC: 5.4 MMOL/L (ref 3.5–5.1)
PROT SERPL-MCNC: 7.5 G/DL (ref 6–8.4)
SODIUM SERPL-SCNC: 143 MMOL/L (ref 136–145)
TRIGL SERPL-MCNC: 152 MG/DL (ref 30–150)

## 2022-03-07 PROCEDURE — 80053 COMPREHEN METABOLIC PANEL: CPT | Performed by: FAMILY MEDICINE

## 2022-03-07 PROCEDURE — 99999 PR PBB SHADOW E&M-EST. PATIENT-LVL III: ICD-10-PCS | Mod: PBBFAC,,, | Performed by: FAMILY MEDICINE

## 2022-03-07 PROCEDURE — 80061 LIPID PANEL: CPT | Performed by: FAMILY MEDICINE

## 2022-03-07 PROCEDURE — 99999 PR PBB SHADOW E&M-EST. PATIENT-LVL III: CPT | Mod: PBBFAC,,, | Performed by: FAMILY MEDICINE

## 2022-03-07 PROCEDURE — 84153 ASSAY OF PSA TOTAL: CPT | Performed by: FAMILY MEDICINE

## 2022-03-07 PROCEDURE — 99214 PR OFFICE/OUTPT VISIT, EST, LEVL IV, 30-39 MIN: ICD-10-PCS | Mod: S$PBB,,, | Performed by: FAMILY MEDICINE

## 2022-03-07 PROCEDURE — 36415 COLL VENOUS BLD VENIPUNCTURE: CPT | Mod: PO | Performed by: FAMILY MEDICINE

## 2022-03-07 PROCEDURE — 99214 OFFICE O/P EST MOD 30 MIN: CPT | Mod: S$PBB,,, | Performed by: FAMILY MEDICINE

## 2022-03-07 PROCEDURE — 99213 OFFICE O/P EST LOW 20 MIN: CPT | Mod: PBBFAC,PO | Performed by: FAMILY MEDICINE

## 2022-03-07 RX ORDER — ATORVASTATIN CALCIUM 10 MG/1
5 TABLET, FILM COATED ORAL DAILY
Qty: 45 TABLET | Refills: 3 | Status: SHIPPED | OUTPATIENT
Start: 2022-03-07 | End: 2023-03-07

## 2022-03-07 RX ORDER — EZETIMIBE 10 MG/1
10 TABLET ORAL DAILY
Qty: 90 TABLET | Refills: 3 | Status: SHIPPED | OUTPATIENT
Start: 2022-03-07 | End: 2023-03-22

## 2022-03-07 RX ORDER — AMLODIPINE BESYLATE 5 MG/1
5 TABLET ORAL NIGHTLY
Qty: 90 TABLET | Refills: 3 | Status: SHIPPED | OUTPATIENT
Start: 2022-03-07 | End: 2022-03-22

## 2022-03-07 RX ORDER — CARVEDILOL 3.12 MG/1
TABLET ORAL
Qty: 90 TABLET | Refills: 3 | Status: SHIPPED | OUTPATIENT
Start: 2022-03-07 | End: 2023-03-07

## 2022-03-07 NOTE — PROGRESS NOTES
Subjective:       Patient ID: Sean Main is a 81 y.o. male.    Chief Complaint: Preventative Health Care (Physical)    Here for annual exam and f/u on chronic health issues    Started on vitamin D 50,000 IU weekly 8 weeks ago  CAD s/p CABG - following with cardiology' occ skipped beats at night; asa 81mg  HTN - tolerating amlodipine 5mg daily and Coreg 3.125mg 1/2 BID  HLD - tolerating Zetia 10, Lipitor 10mg daily  GERD/hiatal hernia - Protonix 40mg    Exercises 3 times a week    Past Medical History:    CAD (coronary artery disease)                                 GERD (gastroesophageal reflux disease)                        Hernia of unspecified site of abdominal cavity*                 Comment:hiatal hernia    Diverticulosis                                                HTN (hypertension)                                            HLD (hyperlipidemia)                                          Past Surgical History:    CORONARY ARTERY BYPASS GRAFT                     4/4/2012        Comment:6 vessels    UPPER GASTROINTESTINAL ENDOSCOPY                 2011            Comment:hiatal hernia    COLONOSCOPY                                      2008            Comment:diverticulosis    CHOLECYSTECTOMY                                                LIVER BIOPSY                                                   Allergies:   -- No Known Drug Allergies    -- Pravastatin     --  myalgias    Social History    Marital Status:                         Social History Main Topics    Smoking Status: Never Smoker                      Smokeless Status: Never Used                        Alcohol Use: No              Drug Use: No                Current Outpatient Medications on File Prior to Visit:  amLODIPine (NORVASC) 5 MG tablet, TAKE 1 TABLET BY MOUTH ONCE DAILY IN THE EVENING, Disp: 90 tablet, Rfl: 4  aspirin (ECOTRIN) 81 MG EC tablet, Take 81 mg by mouth once daily., Disp: , Rfl:   atorvastatin (LIPITOR) 10 MG  tablet, Take 1/2 (one-half) tablet by mouth once daily, Disp: 45 tablet, Rfl: 1  carvediloL (COREG) 3.125 MG tablet, TAKE 1/2 (ONE-HALF) TABLET BY MOUTH TWICE DAILY WITH FOOD, Disp: 90 tablet, Rfl: 1  ezetimibe (ZETIA) 10 mg tablet, Take 1 tablet by mouth once daily, Disp: 90 tablet, Rfl: 3  multivitamin,tx-iron-minerals Tab, Take 1 tablet by mouth once daily., Disp: , Rfl:   niacin 50 MG Tab, Take 100 mg by mouth every evening., Disp: , Rfl:   pantoprazole (PROTONIX) 40 MG tablet, Take 1 tablet by mouth once daily, Disp: 90 tablet, Rfl: 3  saw palmetto 500 MG capsule, Take 500 mg by mouth 2 (two) times daily., Disp: , Rfl:   fluticasone propionate (FLONASE) 50 mcg/actuation nasal spray, Use 1 spray(s) in each nostril once daily (Patient not taking: Reported on 3/7/2022), Disp: 16 g, Rfl: 11    No current facility-administered medications on file prior to visit.            Review of patient's family history indicates:    Cirrhosis                      Neg Hx                    Liver cancer                   Neg Hx                    Liver disease                  Neg Hx                    Colon cancer                   Neg Hx                      Hypertension  Pertinent negatives include no chest pain, headaches, neck pain, palpitations or shortness of breath.     Review of Systems   Constitutional: Negative for appetite change, fatigue, fever and unexpected weight change.   HENT: Negative for congestion, ear discharge, ear pain, nosebleeds, postnasal drip, rhinorrhea, sinus pressure, sore throat and trouble swallowing.    Eyes: Negative for pain, discharge, redness and visual disturbance.   Respiratory: Negative for apnea, cough, chest tightness, shortness of breath and wheezing.    Cardiovascular: Negative for chest pain, palpitations and leg swelling.   Gastrointestinal: Negative for abdominal distention, abdominal pain, blood in stool, constipation, diarrhea, nausea and vomiting.   Genitourinary: Negative for  "difficulty urinating, dysuria, flank pain, frequency, hematuria, penile swelling (genital pain or swelling), scrotal swelling, testicular pain and urgency.   Musculoskeletal: Negative for back pain, gait problem, joint swelling, myalgias and neck pain.   Skin: Negative for color change, rash and wound.   Neurological: Negative for dizziness, tremors, seizures, syncope, speech difficulty, weakness, light-headedness, numbness and headaches.   Hematological: Negative for adenopathy. Does not bruise/bleed easily.   Psychiatric/Behavioral: Negative for behavioral problems, confusion, hallucinations and suicidal ideas.       Objective:       /70   Pulse (!) 52   Temp 98.7 °F (37.1 °C) (Oral)   Resp 18   Ht 5' 9" (1.753 m)   Wt 73.3 kg (161 lb 9.6 oz)   SpO2 97%   BMI 23.86 kg/m²     Physical Exam  Constitutional:       General: He is not in acute distress.     Appearance: He is well-developed.   HENT:      Head: Normocephalic and atraumatic.      Right Ear: External ear normal.      Left Ear: External ear normal.      Mouth/Throat:      Pharynx: Uvula midline. No oropharyngeal exudate.   Eyes:      General: Lids are normal.      Conjunctiva/sclera: Conjunctivae normal.      Pupils: Pupils are equal, round, and reactive to light.   Neck:      Thyroid: No thyroid mass or thyromegaly.      Trachea: Phonation normal.   Cardiovascular:      Rate and Rhythm: Normal rate and regular rhythm.      Heart sounds: Normal heart sounds. No murmur heard.    No friction rub. No gallop.   Pulmonary:      Effort: Pulmonary effort is normal. No respiratory distress.      Breath sounds: Normal breath sounds. No wheezing or rales.   Abdominal:      General: Bowel sounds are normal. There is no distension.      Palpations: Abdomen is soft. There is no mass.      Tenderness: There is no abdominal tenderness. There is no guarding or rebound.   Musculoskeletal:         General: Normal range of motion.      Cervical back: Full passive " range of motion without pain, normal range of motion and neck supple.   Lymphadenopathy:      Cervical: No cervical adenopathy.   Skin:     General: Skin is warm and dry.   Neurological:      Mental Status: He is alert and oriented to person, place, and time.      Cranial Nerves: No cranial nerve deficit.      Coordination: Coordination normal.   Psychiatric:         Speech: Speech normal.         Behavior: Behavior normal.         Thought Content: Thought content normal.         Judgment: Judgment normal.         Results for orders placed or performed in visit on 12/13/21   POTASSIUM   Result Value Ref Range    Potassium 4.7 3.5 - 5.1 mmol/L     Assessment:       1. Essential hypertension    2. Coronary artery disease involving coronary bypass graft of native heart without angina pectoris    3. Dyslipidemia    4. Gastroesophageal reflux disease without esophagitis    5. Prostate cancer screening    6. Hyperlipidemia, unspecified hyperlipidemia type        Plan:       Essential hypertension    Coronary artery disease involving coronary bypass graft of native heart without angina pectoris    Dyslipidemia  -     Comprehensive Metabolic Panel; Future; Expected date: 03/07/2022  -     Lipid Panel; Future; Expected date: 03/07/2022  -     ezetimibe (ZETIA) 10 mg tablet; Take 1 tablet (10 mg total) by mouth once daily.  Dispense: 90 tablet; Refill: 3    Gastroesophageal reflux disease without esophagitis    Prostate cancer screening  -     PSA, Screening; Future; Expected date: 03/07/2022    Hyperlipidemia, unspecified hyperlipidemia type  -     atorvastatin (LIPITOR) 10 MG tablet; Take 0.5 tablets (5 mg total) by mouth once daily.  Dispense: 45 tablet; Refill: 3    Other orders  -     carvediloL (COREG) 3.125 MG tablet; TAKE 1/2 (ONE-HALF) TABLET BY MOUTH TWICE DAILY WITH FOOD  Dispense: 90 tablet; Refill: 3  -     amLODIPine (NORVASC) 5 MG tablet; Take 1 tablet (5 mg total) by mouth every evening.  Dispense: 90 tablet;  Refill: 3          Trial of lowering and eliminating protonix  Labs today  Counseled on regular exercise, maintenance of a healthy weight, balanced diet rich in fruits/vegetables and lean protein, and avoidance of unhealthy habits like smoking and excessive alcohol intake.  Continue present meds  F/u 1 year or as needed

## 2022-03-18 ENCOUNTER — PES CALL (OUTPATIENT)
Dept: ADMINISTRATIVE | Facility: CLINIC | Age: 82
End: 2022-03-18
Payer: MEDICARE

## 2022-03-21 ENCOUNTER — PES CALL (OUTPATIENT)
Dept: ADMINISTRATIVE | Facility: CLINIC | Age: 82
End: 2022-03-21
Payer: MEDICARE

## 2022-03-22 RX ORDER — AMLODIPINE BESYLATE 5 MG/1
TABLET ORAL
Qty: 90 TABLET | Refills: 4 | Status: SHIPPED | OUTPATIENT
Start: 2022-03-22 | End: 2023-03-21

## 2022-03-23 ENCOUNTER — OFFICE VISIT (OUTPATIENT)
Dept: FAMILY MEDICINE | Facility: CLINIC | Age: 82
End: 2022-03-23
Payer: MEDICARE

## 2022-03-23 VITALS
BODY MASS INDEX: 23.94 KG/M2 | SYSTOLIC BLOOD PRESSURE: 132 MMHG | HEART RATE: 51 BPM | DIASTOLIC BLOOD PRESSURE: 76 MMHG | OXYGEN SATURATION: 97 % | WEIGHT: 161.63 LBS | HEIGHT: 69 IN

## 2022-03-23 DIAGNOSIS — Z00.00 ENCOUNTER FOR PREVENTIVE HEALTH EXAMINATION: Primary | ICD-10-CM

## 2022-03-23 DIAGNOSIS — I25.10 CORONARY ARTERY DISEASE INVOLVING NATIVE CORONARY ARTERY OF NATIVE HEART WITHOUT ANGINA PECTORIS: Chronic | ICD-10-CM

## 2022-03-23 DIAGNOSIS — I10 ESSENTIAL HYPERTENSION: Chronic | ICD-10-CM

## 2022-03-23 DIAGNOSIS — E78.5 DYSLIPIDEMIA: Chronic | ICD-10-CM

## 2022-03-23 PROCEDURE — G0439 PR MEDICARE ANNUAL WELLNESS SUBSEQUENT VISIT: ICD-10-PCS | Mod: ,,, | Performed by: NURSE PRACTITIONER

## 2022-03-23 PROCEDURE — 99999 PR PBB SHADOW E&M-EST. PATIENT-LVL IV: ICD-10-PCS | Mod: PBBFAC,,, | Performed by: NURSE PRACTITIONER

## 2022-03-23 PROCEDURE — 99214 OFFICE O/P EST MOD 30 MIN: CPT | Mod: PBBFAC,PO | Performed by: NURSE PRACTITIONER

## 2022-03-23 PROCEDURE — G0439 PPPS, SUBSEQ VISIT: HCPCS | Mod: ,,, | Performed by: NURSE PRACTITIONER

## 2022-03-23 PROCEDURE — 99999 PR PBB SHADOW E&M-EST. PATIENT-LVL IV: CPT | Mod: PBBFAC,,, | Performed by: NURSE PRACTITIONER

## 2022-03-23 NOTE — PROGRESS NOTES
"  Sean Main presented for a  Medicare AWV and comprehensive Health Risk Assessment today. The following components were reviewed and updated:    · Medical history  · Family History  · Social history  · Allergies and Current Medications  · Health Risk Assessment  · Health Maintenance  · Care Team     ** See Completed Assessments for Annual Wellness Visit within the encounter summary.**     The following assessments were completed:  · Living Situation  · CAGE  · Depression Screening  · Timed Get Up and Go  · Whisper Test  · Cognitive Function Screening    · Nutrition Screening  · ADL Screening  · PAQ Screening    Vitals:    03/23/22 0857   BP: 132/76   BP Location: Left arm   Patient Position: Sitting   BP Method: Medium (Manual)   Pulse: (!) 51   SpO2: 97%   Weight: 73.3 kg (161 lb 9.6 oz)   Height: 5' 9" (1.753 m)     Body mass index is 23.86 kg/m².  Physical Exam  Vitals reviewed.   Constitutional:       Appearance: Normal appearance.   Cardiovascular:      Rate and Rhythm: Normal rate and regular rhythm.      Pulses: Normal pulses.      Heart sounds: Normal heart sounds.   Pulmonary:      Effort: Pulmonary effort is normal.      Breath sounds: Normal breath sounds.   Skin:     General: Skin is warm and dry.   Neurological:      Mental Status: He is alert and oriented to person, place, and time.         Diagnoses and health risks identified today and associated recommendations/orders:    1. Encounter for preventive health examination  Reviewed and discussed health maintenance.      2. Coronary artery disease involving native coronary artery of native heart without angina pectoris  Stable- continue current treatment and follow up routinely with PCP     3. Dyslipidemia  Stable- continue current treatment and follow up routinely with PCP     4. Essential hypertension  Stable- continue current treatment and follow up routinely with PCP      Provided Sean with a 5-10 year written screening schedule and personal " prevention plan. Recommendations were developed using the USPSTF age appropriate recommendations. Education, counseling, and referrals were provided as needed. After Visit Summary printed and given to patient which includes a list of additional screenings\tests needed.    I offered to discuss advanced care planning, including how to pick a person who would make decisions for you if you were unable to make them for yourself, called a health care power of , and what kind of decisions you might make such as use of life sustaining treatments such as ventilators and tube feeding when faced with a life limiting illness recorded on a living will that they will need to know. (How you want to be cared for as you near the end of your natural life)     X Patient is interested in learning more about how to make advanced directives.  I provided them paperwork and offered to discuss this with them.    Fatou Dhaliwal, NP

## 2022-03-23 NOTE — PATIENT INSTRUCTIONS
Counseling and Referral of Other Preventative  (Italic type indicates deductible and co-insurance are waived)    Patient Name: Sean Main  Today's Date: 3/23/2022    Health Maintenance       Date Due Completion Date    COVID-19 Vaccine (1) Never done ---    Shingles Vaccine (1 of 2) Never done ---    Aspirin/Antiplatelet Therapy 03/07/2023 3/7/2022    Override on 2/26/2021: Done (81mg daily)    TETANUS VACCINE 08/08/2026 8/8/2016 (Declined)    Override on 8/8/2016: Declined    Lipid Panel 03/07/2027 3/7/2022        No orders of the defined types were placed in this encounter.    The following information is provided to all patients.  This information is to help you find resources for any of the problems found today that may be affecting your health:                Living healthy guide: www.[facility].[place].gov      Understanding Diabetes: www.diabetes.org      Eating healthy: www.cdc.gov/healthyweight      CDC home safety checklist: www.cdc.gov/steadi/patient.html      Agency on Aging: www.goea.[place].[facility]      Alcoholics anonymous (AA): www.aa.org      Physical Activity: www.romelia.nih.gov/jz2fhav      Tobacco use: www.quitwithusla.org

## 2022-05-09 ENCOUNTER — PATIENT MESSAGE (OUTPATIENT)
Dept: SMOKING CESSATION | Facility: CLINIC | Age: 82
End: 2022-05-09
Payer: MEDICARE

## 2022-06-30 ENCOUNTER — EXTERNAL CHRONIC CARE MANAGEMENT (OUTPATIENT)
Dept: PRIMARY CARE CLINIC | Facility: CLINIC | Age: 82
End: 2022-06-30
Payer: MEDICARE

## 2022-06-30 PROCEDURE — 99490 PR CHRONIC CARE MGMT, 1ST 20 MIN: ICD-10-PCS | Mod: S$PBB,,, | Performed by: FAMILY MEDICINE

## 2022-06-30 PROCEDURE — 99490 CHRNC CARE MGMT STAFF 1ST 20: CPT | Mod: S$PBB,,, | Performed by: FAMILY MEDICINE

## 2022-06-30 PROCEDURE — 99490 CHRNC CARE MGMT STAFF 1ST 20: CPT | Mod: PBBFAC,PO | Performed by: FAMILY MEDICINE

## 2022-07-31 ENCOUNTER — EXTERNAL CHRONIC CARE MANAGEMENT (OUTPATIENT)
Dept: PRIMARY CARE CLINIC | Facility: CLINIC | Age: 82
End: 2022-07-31
Payer: MEDICARE

## 2022-07-31 PROCEDURE — 99490 PR CHRONIC CARE MGMT, 1ST 20 MIN: ICD-10-PCS | Mod: S$PBB,,, | Performed by: FAMILY MEDICINE

## 2022-07-31 PROCEDURE — 99490 CHRNC CARE MGMT STAFF 1ST 20: CPT | Mod: PBBFAC,PO | Performed by: FAMILY MEDICINE

## 2022-07-31 PROCEDURE — 99439 CHRNC CARE MGMT STAF EA ADDL: CPT | Mod: PBBFAC,27,PO | Performed by: FAMILY MEDICINE

## 2022-07-31 PROCEDURE — 99490 CHRNC CARE MGMT STAFF 1ST 20: CPT | Mod: S$PBB,,, | Performed by: FAMILY MEDICINE

## 2022-07-31 PROCEDURE — 99439 CHRNC CARE MGMT STAF EA ADDL: CPT | Mod: S$PBB,,, | Performed by: FAMILY MEDICINE

## 2022-07-31 PROCEDURE — 99439 PR CHRONIC CARE MGMT, EA ADDTL 20 MIN: ICD-10-PCS | Mod: S$PBB,,, | Performed by: FAMILY MEDICINE

## 2022-08-11 ENCOUNTER — TELEPHONE (OUTPATIENT)
Dept: FAMILY MEDICINE | Facility: CLINIC | Age: 82
End: 2022-08-11
Payer: MEDICARE

## 2022-08-11 NOTE — TELEPHONE ENCOUNTER
Left message on recorder that we received this medication refill request.  Called to see if he still needed it.  [place] still [place]          Agustina RAINES Staff  Phone Number: [phone]     MRN: [id number]     Pt: Sean Main     Phone: [phone]     Pharmacy: [address]       Good morning,    Pt requested refill on pantoprazole (PROTONIX) 40 MG tablet   Take 1 tablet by mouth once daily     Pt sees Stevenson Garland (Physician)     Thanks,         Agustina CAMPBELL LPN     Care Coordinator     [facility]     [phone] ext [phone]

## 2022-08-14 DIAGNOSIS — K21.9 GASTROESOPHAGEAL REFLUX DISEASE: ICD-10-CM

## 2022-08-14 NOTE — TELEPHONE ENCOUNTER
No new care gaps identified.  [facility] Embedded Care Gaps. Reference number: [id number]. 8/14/2022   4:34:44 PM CDT

## 2022-08-15 RX ORDER — PANTOPRAZOLE SODIUM 40 MG/1
TABLET, DELAYED RELEASE ORAL
Qty: 90 TABLET | Refills: 2 | Status: SHIPPED | OUTPATIENT
Start: 2022-08-15 | End: 2023-09-13

## 2022-08-15 NOTE — TELEPHONE ENCOUNTER
Refill Decision Note   Sean Jose David  is requesting a refill authorization.  Brief Assessment and Rationale for Refill:  Approve     Medication Therapy Plan:       Medication Reconciliation Completed: No   Comments:     No Care Gaps recommended.     Note composed:4:47 PM 08/15/2022

## 2022-08-30 ENCOUNTER — TELEPHONE (OUTPATIENT)
Dept: FAMILY MEDICINE | Facility: CLINIC | Age: 82
End: 2022-08-30
Payer: MEDICARE

## 2022-08-31 ENCOUNTER — EXTERNAL CHRONIC CARE MANAGEMENT (OUTPATIENT)
Dept: PRIMARY CARE CLINIC | Facility: CLINIC | Age: 82
End: 2022-08-31
Payer: MEDICARE

## 2022-08-31 PROCEDURE — 99439 CHRNC CARE MGMT STAF EA ADDL: CPT | Mod: S$PBB,,, | Performed by: FAMILY MEDICINE

## 2022-08-31 PROCEDURE — 99439 CHRNC CARE MGMT STAF EA ADDL: CPT | Mod: PBBFAC,PO | Performed by: FAMILY MEDICINE

## 2022-08-31 PROCEDURE — 99490 CHRNC CARE MGMT STAFF 1ST 20: CPT | Mod: PBBFAC,PO | Performed by: FAMILY MEDICINE

## 2022-08-31 PROCEDURE — 99439 PR CHRONIC CARE MGMT, EA ADDTL 20 MIN: ICD-10-PCS | Mod: S$PBB,,, | Performed by: FAMILY MEDICINE

## 2022-08-31 PROCEDURE — 99490 CHRNC CARE MGMT STAFF 1ST 20: CPT | Mod: S$PBB,,, | Performed by: FAMILY MEDICINE

## 2022-08-31 PROCEDURE — 99490 PR CHRONIC CARE MGMT, 1ST 20 MIN: ICD-10-PCS | Mod: S$PBB,,, | Performed by: FAMILY MEDICINE

## 2022-09-30 ENCOUNTER — EXTERNAL CHRONIC CARE MANAGEMENT (OUTPATIENT)
Dept: PRIMARY CARE CLINIC | Facility: CLINIC | Age: 82
End: 2022-09-30
Payer: MEDICARE

## 2022-09-30 PROCEDURE — 99439 CHRNC CARE MGMT STAF EA ADDL: CPT | Mod: S$PBB,,, | Performed by: FAMILY MEDICINE

## 2022-09-30 PROCEDURE — 99439 CHRNC CARE MGMT STAF EA ADDL: CPT | Mod: PBBFAC,PO | Performed by: FAMILY MEDICINE

## 2022-09-30 PROCEDURE — 99490 CHRNC CARE MGMT STAFF 1ST 20: CPT | Mod: S$PBB,,, | Performed by: FAMILY MEDICINE

## 2022-09-30 PROCEDURE — 99490 PR CHRONIC CARE MGMT, 1ST 20 MIN: ICD-10-PCS | Mod: S$PBB,,, | Performed by: FAMILY MEDICINE

## 2022-09-30 PROCEDURE — 99490 CHRNC CARE MGMT STAFF 1ST 20: CPT | Mod: PBBFAC,PO | Performed by: FAMILY MEDICINE

## 2022-09-30 PROCEDURE — 99439 PR CHRONIC CARE MGMT, EA ADDTL 20 MIN: ICD-10-PCS | Mod: S$PBB,,, | Performed by: FAMILY MEDICINE

## 2022-10-10 ENCOUNTER — OFFICE VISIT (OUTPATIENT)
Dept: FAMILY MEDICINE | Facility: CLINIC | Age: 82
End: 2022-10-10
Payer: MEDICARE

## 2022-10-10 VITALS
OXYGEN SATURATION: 96 % | BODY MASS INDEX: 23.05 KG/M2 | HEART RATE: 50 BPM | HEIGHT: 69 IN | RESPIRATION RATE: 18 BRPM | DIASTOLIC BLOOD PRESSURE: 70 MMHG | TEMPERATURE: 98 F | WEIGHT: 155.63 LBS | SYSTOLIC BLOOD PRESSURE: 136 MMHG

## 2022-10-10 DIAGNOSIS — I10 ESSENTIAL HYPERTENSION: ICD-10-CM

## 2022-10-10 DIAGNOSIS — J31.0 GUSTATORY RHINITIS: Primary | ICD-10-CM

## 2022-10-10 PROCEDURE — 99213 PR OFFICE/OUTPT VISIT, EST, LEVL III, 20-29 MIN: ICD-10-PCS | Mod: S$PBB,,, | Performed by: FAMILY MEDICINE

## 2022-10-10 PROCEDURE — 99213 OFFICE O/P EST LOW 20 MIN: CPT | Mod: S$PBB,,, | Performed by: FAMILY MEDICINE

## 2022-10-10 PROCEDURE — 99999 PR PBB SHADOW E&M-EST. PATIENT-LVL III: ICD-10-PCS | Mod: PBBFAC,,, | Performed by: FAMILY MEDICINE

## 2022-10-10 PROCEDURE — 99213 OFFICE O/P EST LOW 20 MIN: CPT | Mod: PBBFAC,PO | Performed by: FAMILY MEDICINE

## 2022-10-10 PROCEDURE — 99999 PR PBB SHADOW E&M-EST. PATIENT-LVL III: CPT | Mod: PBBFAC,,, | Performed by: FAMILY MEDICINE

## 2022-10-10 NOTE — PROGRESS NOTES
Subjective:       Patient ID: Sean Main is a 81 y.o. male.    Chief Complaint: Hypertension (Follow up)    Patient presents with:  Hypertension: Follow up    He to f/u on recent concerns regarding sinuses.  He reports some clear rhinorrhea after eating and with temperature changes.  Some sneezing  Using Flonase PRN nasal congestion      Started on vitamin D 50,000 IU weekly 8 weeks ago  CAD s/p CABG - following with cardiology' occ skipped beats at night; asa 81mg  HTN - tolerating amlodipine 5mg daily and Coreg 3.125mg 1/2 BID  HLD - tolerating Zetia 10, Lipitor 10mg daily  GERD/hiatal hernia - Protonix 40mg    Exercises 3 times a week    Past Medical History:    CAD (coronary artery disease)                                 GERD (gastroesophageal reflux disease)                        Hernia of unspecified site of abdominal cavity*                 Comment:hiatal hernia    Diverticulosis                                                HTN (hypertension)                                            HLD (hyperlipidemia)                                          Past Surgical History:    CORONARY ARTERY BYPASS GRAFT                     4/4/2012        Comment:6 vessels    UPPER GASTROINTESTINAL ENDOSCOPY                 2011            Comment:hiatal hernia    COLONOSCOPY                                      2008            Comment:diverticulosis    CHOLECYSTECTOMY                                                LIVER BIOPSY                                                   Allergies:   -- No Known Drug Allergies    -- Pravastatin     --  myalgias    Social History    Marital Status:                         Social History Main Topics    Smoking Status: Never Smoker                      Smokeless Status: Never Used                        Alcohol Use: No              Drug Use: No                Current Outpatient Medications on File Prior to Visit:  amLODIPine (NORVASC) 5 MG tablet, TAKE 1 TABLET BY MOUTH  ONCE DAILY IN THE EVENING, Disp: 90 tablet, Rfl: 4  aspirin (ECOTRIN) 81 MG EC tablet, Take 81 mg by mouth once daily., Disp: , Rfl:   atorvastatin (LIPITOR) 10 MG tablet, Take 1/2 (one-half) tablet by mouth once daily, Disp: 45 tablet, Rfl: 1  carvediloL (COREG) 3.125 MG tablet, TAKE 1/2 (ONE-HALF) TABLET BY MOUTH TWICE DAILY WITH FOOD, Disp: 90 tablet, Rfl: 1  ezetimibe (ZETIA) 10 mg tablet, Take 1 tablet by mouth once daily, Disp: 90 tablet, Rfl: 3  multivitamin,tx-iron-minerals Tab, Take 1 tablet by mouth once daily., Disp: , Rfl:   niacin 50 MG Tab, Take 100 mg by mouth every evening., Disp: , Rfl:   pantoprazole (PROTONIX) 40 MG tablet, Take 1 tablet by mouth once daily, Disp: 90 tablet, Rfl: 3  saw palmetto 500 MG capsule, Take 500 mg by mouth 2 (two) times daily., Disp: , Rfl:   fluticasone propionate (FLONASE) 50 mcg/actuation nasal spray, Use 1 spray(s) in each nostril once daily (Patient not taking: Reported on 3/7/2022), Disp: 16 g, Rfl: 11    No current facility-administered medications on file prior to visit.            Review of patient's family history indicates:    Cirrhosis                      Neg Hx                    Liver cancer                   Neg Hx                    Liver disease                  Neg Hx                    Colon cancer                   Neg Hx                      Hypertension  Pertinent negatives include no chest pain, headaches, neck pain, palpitations or shortness of breath.   Review of Systems   Constitutional:  Negative for appetite change, fatigue, fever and unexpected weight change.   HENT:  Negative for congestion, ear discharge, ear pain, nosebleeds, postnasal drip, rhinorrhea, sinus pressure, sore throat and trouble swallowing.    Eyes:  Negative for pain, discharge, redness and visual disturbance.   Respiratory:  Negative for apnea, cough, chest tightness, shortness of breath and wheezing.    Cardiovascular:  Negative for chest pain, palpitations and leg  "swelling.   Gastrointestinal:  Negative for abdominal distention, abdominal pain, blood in stool, constipation, diarrhea, nausea and vomiting.   Genitourinary:  Negative for difficulty urinating, dysuria, flank pain, frequency, hematuria, penile swelling (genital pain or swelling), scrotal swelling, testicular pain and urgency.   Musculoskeletal:  Negative for back pain, gait problem, joint swelling, myalgias and neck pain.   Skin:  Negative for color change, rash and wound.   Neurological:  Negative for dizziness, tremors, seizures, syncope, speech difficulty, weakness, light-headedness, numbness and headaches.   Hematological:  Negative for adenopathy. Does not bruise/bleed easily.   Psychiatric/Behavioral:  Negative for behavioral problems, confusion, hallucinations and suicidal ideas.      Objective:       /70   Pulse (!) 50   Temp 97.9 °F (36.6 °C) (Oral)   Resp 18   Ht 5' 9" (1.753 m)   Wt 70.6 kg (155 lb 10.3 oz)   SpO2 96%   BMI 22.98 kg/m²     Physical Exam  Constitutional:       General: He is not in acute distress.     Appearance: He is well-developed.   HENT:      Head: Normocephalic and atraumatic.      Right Ear: External ear normal.      Left Ear: External ear normal.      Mouth/Throat:      Pharynx: Uvula midline. No oropharyngeal exudate.   Eyes:      General: Lids are normal.      Conjunctiva/sclera: Conjunctivae normal.      Pupils: Pupils are equal, round, and reactive to light.   Neck:      Thyroid: No thyroid mass or thyromegaly.      Trachea: Phonation normal.   Cardiovascular:      Rate and Rhythm: Normal rate and regular rhythm.      Heart sounds: Normal heart sounds. No murmur heard.    No friction rub. No gallop.   Pulmonary:      Effort: Pulmonary effort is normal. No respiratory distress.      Breath sounds: Normal breath sounds. No wheezing or rales.   Abdominal:      General: Bowel sounds are normal. There is no distension.      Palpations: Abdomen is soft. There is no " mass.      Tenderness: There is no abdominal tenderness. There is no guarding or rebound.   Musculoskeletal:         General: Normal range of motion.      Cervical back: Full passive range of motion without pain, normal range of motion and neck supple.   Lymphadenopathy:      Cervical: No cervical adenopathy.   Skin:     General: Skin is warm and dry.   Neurological:      Mental Status: He is alert and oriented to person, place, and time.      Cranial Nerves: No cranial nerve deficit.      Coordination: Coordination normal.   Psychiatric:         Speech: Speech normal.         Behavior: Behavior normal.         Thought Content: Thought content normal.         Judgment: Judgment normal.       Results for orders placed or performed in visit on 03/07/22   Comprehensive Metabolic Panel   Result Value Ref Range    Sodium 143 136 - 145 mmol/L    Potassium 5.4 (H) 3.5 - 5.1 mmol/L    Chloride 106 95 - 110 mmol/L    CO2 28 23 - 29 mmol/L    Glucose 105 70 - 110 mg/dL    BUN 13 8 - 23 mg/dL    Creatinine 1.0 0.5 - 1.4 mg/dL    Calcium 9.8 8.7 - 10.5 mg/dL    Total Protein 7.5 6.0 - 8.4 g/dL    Albumin 4.2 3.5 - 5.2 g/dL    Total Bilirubin 0.7 0.1 - 1.0 mg/dL    Alkaline Phosphatase 80 55 - 135 U/L    AST 49 (H) 10 - 40 U/L    ALT 25 10 - 44 U/L    Anion Gap 9 8 - 16 mmol/L    eGFR if African American >60.0 >60 mL/min/1.73 m^2    eGFR if non African American >60.0 >60 mL/min/1.73 m^2   Lipid Panel   Result Value Ref Range    Cholesterol 141 120 - 199 mg/dL    Triglycerides 152 (H) 30 - 150 mg/dL    HDL 56 40 - 75 mg/dL    LDL Cholesterol 54.6 (L) 63.0 - 159.0 mg/dL    HDL/Cholesterol Ratio 39.7 20.0 - 50.0 %    Total Cholesterol/HDL Ratio 2.5 2.0 - 5.0    Non-HDL Cholesterol 85 mg/dL   PSA, Screening   Result Value Ref Range    PSA, Screen 2.0 0.00 - 4.00 ng/mL     Assessment:       1. Gustatory rhinitis    2. Essential hypertension          Plan:       Gustatory rhinitis    Essential hypertension          Trial of claritin  PRN  Continue present BP medication  Counseled on regular exercise, maintenance of a healthy weight, balanced diet rich in fruits/vegetables and lean protein, and avoidance of unhealthy habits like smoking and excessive alcohol intake.  Continue present meds  F/u as previously advised

## 2022-10-31 ENCOUNTER — EXTERNAL CHRONIC CARE MANAGEMENT (OUTPATIENT)
Dept: PRIMARY CARE CLINIC | Facility: CLINIC | Age: 82
End: 2022-10-31
Payer: MEDICARE

## 2022-10-31 PROCEDURE — 99490 CHRNC CARE MGMT STAFF 1ST 20: CPT | Mod: PBBFAC,PO | Performed by: FAMILY MEDICINE

## 2022-10-31 PROCEDURE — 99490 PR CHRONIC CARE MGMT, 1ST 20 MIN: ICD-10-PCS | Mod: S$PBB,,, | Performed by: FAMILY MEDICINE

## 2022-10-31 PROCEDURE — 99490 CHRNC CARE MGMT STAFF 1ST 20: CPT | Mod: S$PBB,,, | Performed by: FAMILY MEDICINE

## 2022-11-02 ENCOUNTER — TELEPHONE (OUTPATIENT)
Dept: FAMILY MEDICINE | Facility: CLINIC | Age: 82
End: 2022-11-02
Payer: MEDICARE

## 2022-11-30 ENCOUNTER — EXTERNAL CHRONIC CARE MANAGEMENT (OUTPATIENT)
Dept: PRIMARY CARE CLINIC | Facility: CLINIC | Age: 82
End: 2022-11-30
Payer: MEDICARE

## 2022-11-30 PROCEDURE — 99439 CHRNC CARE MGMT STAF EA ADDL: CPT | Mod: PBBFAC,PO | Performed by: FAMILY MEDICINE

## 2022-11-30 PROCEDURE — 99490 PR CHRONIC CARE MGMT, 1ST 20 MIN: ICD-10-PCS | Mod: S$PBB,,, | Performed by: FAMILY MEDICINE

## 2022-11-30 PROCEDURE — 99490 CHRNC CARE MGMT STAFF 1ST 20: CPT | Mod: PBBFAC,PO | Performed by: FAMILY MEDICINE

## 2022-11-30 PROCEDURE — 99439 CHRNC CARE MGMT STAF EA ADDL: CPT | Mod: S$PBB,,, | Performed by: FAMILY MEDICINE

## 2022-11-30 PROCEDURE — 99490 CHRNC CARE MGMT STAFF 1ST 20: CPT | Mod: S$PBB,,, | Performed by: FAMILY MEDICINE

## 2022-11-30 PROCEDURE — 99439 PR CHRONIC CARE MGMT, EA ADDTL 20 MIN: ICD-10-PCS | Mod: S$PBB,,, | Performed by: FAMILY MEDICINE

## 2022-12-31 ENCOUNTER — EXTERNAL CHRONIC CARE MANAGEMENT (OUTPATIENT)
Dept: PRIMARY CARE CLINIC | Facility: CLINIC | Age: 82
End: 2022-12-31
Payer: MEDICARE

## 2022-12-31 PROCEDURE — 99439 PR CHRONIC CARE MGMT, EA ADDTL 20 MIN: ICD-10-PCS | Mod: S$PBB,,, | Performed by: FAMILY MEDICINE

## 2022-12-31 PROCEDURE — 99490 PR CHRONIC CARE MGMT, 1ST 20 MIN: ICD-10-PCS | Mod: S$PBB,,, | Performed by: FAMILY MEDICINE

## 2022-12-31 PROCEDURE — 99490 CHRNC CARE MGMT STAFF 1ST 20: CPT | Mod: PBBFAC,PO,27 | Performed by: FAMILY MEDICINE

## 2022-12-31 PROCEDURE — 99439 CHRNC CARE MGMT STAF EA ADDL: CPT | Mod: PBBFAC,PO,27 | Performed by: FAMILY MEDICINE

## 2022-12-31 PROCEDURE — 99490 CHRNC CARE MGMT STAFF 1ST 20: CPT | Mod: S$PBB,,, | Performed by: FAMILY MEDICINE

## 2022-12-31 PROCEDURE — 99439 CHRNC CARE MGMT STAF EA ADDL: CPT | Mod: S$PBB,,, | Performed by: FAMILY MEDICINE

## 2023-01-31 ENCOUNTER — EXTERNAL CHRONIC CARE MANAGEMENT (OUTPATIENT)
Dept: PRIMARY CARE CLINIC | Facility: CLINIC | Age: 83
End: 2023-01-31
Payer: MEDICARE

## 2023-01-31 PROCEDURE — 99490 CHRNC CARE MGMT STAFF 1ST 20: CPT | Mod: PBBFAC,PO | Performed by: FAMILY MEDICINE

## 2023-01-31 PROCEDURE — 99490 CHRNC CARE MGMT STAFF 1ST 20: CPT | Mod: S$PBB,,, | Performed by: FAMILY MEDICINE

## 2023-01-31 PROCEDURE — 99490 PR CHRONIC CARE MGMT, 1ST 20 MIN: ICD-10-PCS | Mod: S$PBB,,, | Performed by: FAMILY MEDICINE

## 2023-02-23 ENCOUNTER — TELEPHONE (OUTPATIENT)
Dept: FAMILY MEDICINE | Facility: CLINIC | Age: 83
End: 2023-02-23
Payer: MEDICARE

## 2023-02-28 ENCOUNTER — EXTERNAL CHRONIC CARE MANAGEMENT (OUTPATIENT)
Dept: PRIMARY CARE CLINIC | Facility: CLINIC | Age: 83
End: 2023-02-28
Payer: MEDICARE

## 2023-02-28 PROCEDURE — 99490 PR CHRONIC CARE MGMT, 1ST 20 MIN: ICD-10-PCS | Mod: S$PBB,,, | Performed by: FAMILY MEDICINE

## 2023-02-28 PROCEDURE — 99490 CHRNC CARE MGMT STAFF 1ST 20: CPT | Mod: PBBFAC,PO | Performed by: FAMILY MEDICINE

## 2023-02-28 PROCEDURE — 99490 CHRNC CARE MGMT STAFF 1ST 20: CPT | Mod: S$PBB,,, | Performed by: FAMILY MEDICINE

## 2023-03-01 ENCOUNTER — OFFICE VISIT (OUTPATIENT)
Dept: FAMILY MEDICINE | Facility: CLINIC | Age: 83
End: 2023-03-01
Payer: MEDICARE

## 2023-03-01 VITALS
RESPIRATION RATE: 18 BRPM | BODY MASS INDEX: 22.83 KG/M2 | WEIGHT: 154.13 LBS | HEIGHT: 69 IN | OXYGEN SATURATION: 95 % | TEMPERATURE: 98 F | HEART RATE: 52 BPM | DIASTOLIC BLOOD PRESSURE: 60 MMHG | SYSTOLIC BLOOD PRESSURE: 126 MMHG

## 2023-03-01 DIAGNOSIS — S76.311A HAMSTRING STRAIN, RIGHT, INITIAL ENCOUNTER: Primary | ICD-10-CM

## 2023-03-01 PROCEDURE — 99213 OFFICE O/P EST LOW 20 MIN: CPT | Mod: S$PBB,,, | Performed by: FAMILY MEDICINE

## 2023-03-01 PROCEDURE — 99213 OFFICE O/P EST LOW 20 MIN: CPT | Mod: PBBFAC,PO | Performed by: FAMILY MEDICINE

## 2023-03-01 PROCEDURE — 99213 PR OFFICE/OUTPT VISIT, EST, LEVL III, 20-29 MIN: ICD-10-PCS | Mod: S$PBB,,, | Performed by: FAMILY MEDICINE

## 2023-03-01 PROCEDURE — 99999 PR PBB SHADOW E&M-EST. PATIENT-LVL III: CPT | Mod: PBBFAC,,, | Performed by: FAMILY MEDICINE

## 2023-03-01 PROCEDURE — 99999 PR PBB SHADOW E&M-EST. PATIENT-LVL III: ICD-10-PCS | Mod: PBBFAC,,, | Performed by: FAMILY MEDICINE

## 2023-03-01 NOTE — PROGRESS NOTES
Subjective:       Patient ID: Sean Main is a 82 y.o. male.    Chief Complaint: Pain in back of RT leg getting worse    C/o 3 months of posterior right upper leg pain.  It is aching. Worse with prolonged sitting. Worse in AM.  No change with activity.  8/10 at worse.  No radiation.  Tried ASA with no relief.  No injury.    Walking 3 times a week.      Past Medical History:   Diagnosis Date    CAD (coronary artery disease)     Compression fracture of T12 vertebra     DDD (degenerative disc disease), lumbar     Diverticulosis     GERD (gastroesophageal reflux disease)     Hemorrhoid     Hernia of unspecified site of abdominal cavity without mention of obstruction or gangrene     hiatal hernia    HLD (hyperlipidemia)     HTN (hypertension)        Past Surgical History:   Procedure Laterality Date    BASAL CELL CARCINOMA EXCISION  08/01/2016    left nasal    CHOLECYSTECTOMY      COLONOSCOPY  2008    diverticulosis    CORONARY ARTERY BYPASS GRAFT  4/4/2012    6 vessels    LIVER BIOPSY      UPPER GASTROINTESTINAL ENDOSCOPY  2011    hiatal hernia       Review of patient's allergies indicates:   Allergen Reactions    Lipitor [atorvastatin]      Myalgia      Pravastatin      myalgias       Social History     Socioeconomic History    Marital status:    Tobacco Use    Smoking status: Never    Smokeless tobacco: Never   Substance and Sexual Activity    Alcohol use: No    Drug use: No     Social Determinants of Health     Financial Resource Strain: Low Risk     Difficulty of Paying Living Expenses: Not hard at all   Food Insecurity: No Food Insecurity    Worried About Running Out of Food in the Last Year: Never true    Ran Out of Food in the Last Year: Never true   Transportation Needs: No Transportation Needs    Lack of Transportation (Medical): No    Lack of Transportation (Non-Medical): No   Physical Activity: Insufficiently Active    Days of Exercise per Week: 3 days    Minutes of Exercise per Session: 40 min    Stress: No Stress Concern Present    Feeling of Stress : Not at all   Social Connections: Moderately Isolated    Frequency of Communication with Friends and Family: Twice a week    Frequency of Social Gatherings with Friends and Family: Twice a week    Attends L[employer] day Services: More than 4 times per year    Active Member of Clubs or Organizations: No    Attends Club or Organization Meetings: Never    Marital Status:    Housing Stability: Unknown    Unable to Pay for Housing in the Last Year: No    Number of Places Lived in the Last Year: 1       Current Outpatient Medications on File Prior to Visit   Medication Sig Dispense Refill    amLODIPine (NORVASC) 5 MG tablet TAKE 1 TABLET BY MOUTH ONCE DAILY IN THE EVENING 90 tablet 4    aspirin (ECOTRIN) 81 MG EC tablet Take 81 mg by mouth once daily.      atorvastatin (LIPITOR) 10 MG tablet Take 0.5 tablets (5 mg total) by mouth once daily. 45 tablet 3    carvediloL (COREG) 3.125 MG tablet TAKE 1/2 (ONE-HALF) TABLET BY MOUTH TWICE DAILY WITH FOOD 90 tablet 3    ezetimibe (ZETIA) 10 mg tablet Take 1 tablet (10 mg total) by mouth once daily. 90 tablet 3    fluticasone propionate (FLONASE) 50 mcg/actuation nasal spray Use 1 spray(s) in each nostril once daily 16 g 11    multivitamin,tx-iron-minerals Tab Take 1 tablet by mouth once daily.      niacin 50 MG Tab Take 100 mg by mouth every evening.      pantoprazole (PROTONIX) 40 MG tablet Take 1 tablet by mouth once daily 90 tablet 2    saw palmetto 500 MG capsule Take 500 mg by mouth 2 (two) times daily.       No current facility-administered medications on file prior to visit.       Family History   Problem Relation Age of Onset    Cirrhosis Neg Hx     Liver cancer Neg Hx     Liver disease Neg Hx     Colon cancer Neg Hx        Review of Systems   Constitutional:  Negative for appetite change, chills, fever and unexpected weight change.   HENT:  Negative for sore throat and trouble swallowing.    Eyes:  Negative for  "pain and visual disturbance.   Respiratory:  Negative for cough, chest tightness, shortness of breath and wheezing.    Cardiovascular:  Negative for chest pain, palpitations and leg swelling.   Gastrointestinal:  Negative for abdominal pain, blood in stool, constipation, diarrhea and nausea.   Genitourinary:  Negative for difficulty urinating, dysuria and hematuria.   Musculoskeletal:  Positive for myalgias. Negative for arthralgias, gait problem and neck pain.   Skin:  Negative for rash and wound.   Neurological:  Negative for dizziness, weakness, light-headedness and headaches.   Hematological:  Negative for adenopathy.   Psychiatric/Behavioral:  Negative for dysphoric mood.      Objective:      /60   Pulse (!) 52   Temp 97.9 °F (36.6 °C) (Oral)   Resp 18   Ht 5' 9" (1.753 m)   Wt 69.9 kg (154 lb 1.6 oz)   SpO2 95%   BMI 22.76 kg/m²   Physical Exam  Constitutional:       General: He is not in acute distress.     Appearance: He is well-developed.   HENT:      Head: Normocephalic and atraumatic.      Right Ear: External ear normal.      Left Ear: External ear normal.      Mouth/Throat:      Pharynx: Uvula midline. No oropharyngeal exudate.   Eyes:      General: Lids are normal.      Conjunctiva/sclera: Conjunctivae normal.      Pupils: Pupils are equal, round, and reactive to light.   Neck:      Thyroid: No thyroid mass or thyromegaly.      Trachea: Phonation normal.   Cardiovascular:      Rate and Rhythm: Normal rate and regular rhythm.      Heart sounds: Normal heart sounds. No murmur heard.    No friction rub. No gallop.   Pulmonary:      Effort: Pulmonary effort is normal. No respiratory distress.      Breath sounds: Normal breath sounds. No wheezing or rales.   Musculoskeletal:         General: Normal range of motion.      Cervical back: Full passive range of motion without pain, normal range of motion and neck supple.        Legs:    Lymphadenopathy:      Cervical: No cervical adenopathy. "   Skin:     General: Skin is warm and dry.   Neurological:      Mental Status: He is alert and oriented to person, place, and time.      Cranial Nerves: No cranial nerve deficit.      Coordination: Coordination normal.   Psychiatric:         Speech: Speech normal.         Behavior: Behavior normal.         Thought Content: Thought content normal.         Judgment: Judgment normal.       Assessment:       1. Hamstring strain, right, initial encounter        Plan:       Hamstring strain, right, initial encounter  -     Ambulatory referral/consult to Physical/Occupational Therapy; Future; Expected date: 03/08/2023      Basic stretching of area and activity modification  PT referral  Consider ortho consult if pain persists.  Counseled on regular exercise, maintenance of a healthy weight, balanced diet rich in fruits/vegetables and lean protein, and avoidance of unhealthy habits like smoking and excessive alcohol intake.

## 2023-03-13 ENCOUNTER — CLINICAL SUPPORT (OUTPATIENT)
Dept: REHABILITATION | Facility: HOSPITAL | Age: 83
End: 2023-03-13
Attending: FAMILY MEDICINE
Payer: MEDICARE

## 2023-03-13 DIAGNOSIS — R29.898 WEAKNESS OF BOTH LOWER EXTREMITIES: ICD-10-CM

## 2023-03-13 DIAGNOSIS — Z91.81 PERSONAL HISTORY OF FALL: ICD-10-CM

## 2023-03-13 DIAGNOSIS — S76.311A HAMSTRING STRAIN, RIGHT, INITIAL ENCOUNTER: ICD-10-CM

## 2023-03-13 DIAGNOSIS — M79.651 PAIN OF RIGHT THIGH: ICD-10-CM

## 2023-03-13 DIAGNOSIS — R26.9 ABNORMAL GAIT: ICD-10-CM

## 2023-03-13 PROCEDURE — 97162 PT EVAL MOD COMPLEX 30 MIN: CPT | Mod: PO | Performed by: PHYSICAL THERAPIST
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
## 2023-03-14 ENCOUNTER — TELEPHONE (OUTPATIENT)
Dept: FAMILY MEDICINE | Facility: CLINIC | Age: 83
End: 2023-03-14
Payer: MEDICARE

## 2023-03-14 DIAGNOSIS — M54.16 LUMBAR RADICULOPATHY, CHRONIC: ICD-10-CM

## 2023-03-14 DIAGNOSIS — M51.06 LUMBAR DISC DISORDER WITH MYELOPATHY: Primary | ICD-10-CM

## 2023-03-14 PROBLEM — Z91.81 PERSONAL HISTORY OF FALL: Status: ACTIVE | Noted: 2023-03-14

## 2023-03-14 PROBLEM — R29.898 WEAKNESS OF BOTH LOWER EXTREMITIES: Status: ACTIVE | Noted: 2023-03-14

## 2023-03-14 PROBLEM — M79.651 PAIN OF RIGHT THIGH: Status: ACTIVE | Noted: 2023-03-14

## 2023-03-14 PROBLEM — R26.9 ABNORMAL GAIT: Status: ACTIVE | Noted: 2023-03-14
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
## 2023-03-14 NOTE — TELEPHONE ENCOUNTER
Left message on recorder that I was calling because Dr. Garland ordered a MRI Lumbar, per recommendations of PT.  Please call back to schedule.

## 2023-03-15 ENCOUNTER — TELEPHONE (OUTPATIENT)
Dept: FAMILY MEDICINE | Facility: CLINIC | Age: 83
End: 2023-03-15
Payer: MEDICARE

## 2023-03-15 NOTE — TELEPHONE ENCOUNTER
----- Message from Ascencion Rendon sent at 3/15/2023 11:39 AM CDT -----  Regarding: pt call bk  Name of Who is Calling:BERTA HWANG [[id number]]        What is the request in detail: pt needs a call bk concerning miss call          Can the clinic reply by [facility]: no        What Number to Call Back if not [facility]: [phone] [facility]

## 2023-03-16 ENCOUNTER — HOSPITAL ENCOUNTER (OUTPATIENT)
Dept: RADIOLOGY | Facility: HOSPITAL | Age: 83
Discharge: HOME | End: 2023-03-16
Attending: FAMILY MEDICINE
Payer: MEDICARE

## 2023-03-16 DIAGNOSIS — M51.06 LUMBAR DISC DISORDER WITH MYELOPATHY: ICD-10-CM

## 2023-03-16 DIAGNOSIS — M54.16 LUMBAR RADICULOPATHY, CHRONIC: ICD-10-CM

## 2023-03-16 PROCEDURE — 72148 MRI LUMBAR SPINE WITHOUT CONTRAST: ICD-10-PCS | Mod: 26,,, | Performed by: RADIOLOGY

## 2023-03-16 PROCEDURE — 72148 MRI LUMBAR SPINE W/O DYE: CPT | Mod: 26,,, | Performed by: RADIOLOGY

## 2023-03-16 PROCEDURE — 72148 MRI LUMBAR SPINE W/O DYE: CPT | Mod: TC,PO

## 2023-03-21 ENCOUNTER — CLINICAL SUPPORT (OUTPATIENT)
Dept: REHABILITATION | Facility: HOSPITAL | Age: 83
End: 2023-03-21
Attending: FAMILY MEDICINE
Payer: MEDICARE

## 2023-03-21 DIAGNOSIS — M79.651 PAIN OF RIGHT THIGH: ICD-10-CM

## 2023-03-21 DIAGNOSIS — Z91.81 PERSONAL HISTORY OF FALL: ICD-10-CM

## 2023-03-21 DIAGNOSIS — R26.9 ABNORMAL GAIT: ICD-10-CM

## 2023-03-21 DIAGNOSIS — R29.898 WEAKNESS OF BOTH LOWER EXTREMITIES: Primary | ICD-10-CM

## 2023-03-21 PROCEDURE — 97112 NEUROMUSCULAR REEDUCATION: CPT | Mod: PO,CQ

## 2023-03-21 PROCEDURE — 97110 THERAPEUTIC EXERCISES: CPT | Mod: PO,CQ

## 2023-03-21 NOTE — PROGRESS NOTES
[facility]   Physical Therapy Treatment Note     Name: Sean Main  [facility] Number: [id number]    Therapy Diagnosis:   Encounter Diagnoses   Name Primary?    Weakness of both lower extremities Yes    Personal history of fall     Pain of right thigh     Abnormal gait      Physician: Stevenson Garland MD    Visit Date: 3/21/2023  Physician Orders: PT Eval and Treat   Post Surgical? No Eval and Treat Yes Type of Therapy Outpatient Therapy   Medical Diagnosis from Referral: S76.311A (ICD-10-CM) - Hamstring strain, right, initial encounter   Evaluation Date: 3/13/2023  Authorization Period Expiration: 12/31/2023  Plan of Care Expiration: 5/12/2023  Visit # / Visits authorized: 1/ 20     Time In: 11:30  Time Out: 12:15  Total Billable Time: 45 minutes     Precautions: Standard, Fall, and cancer, He has passed out at home once. He fell prior to his beginning to walk. His last fall was 2 yrs ago.  SUBJECTIVE     Pt reports: having R HS pain with sitting. He feels the EIS is helping decrease his pain. No pain at the present moment.   He was compliant with home exercise program.  Response to previous treatment: well with no increased pain.  Functional change: same    Pain: 0/10  Location: right Hamstring area      OBJECTIVE     Objective Measures updated at progress report unless specified.       TREATMENT     Sean received the treatments listed below:      received therapeutic exercises to develop strength and ROM for 35  minutes including:  Prone lying 2 mins   EIS at counter x 10, every hour (minimal 50-60/day)  Slouch over correct 10x  Thoracic stretch over chair    Pt will benefit from hip and LE strengthening as indicated from weaknesses above. LONG ARC QUAD,leg raises,ant tib strengthening. Repeated lumbar extension in standing and prone lying may be beneficial as ROM was improved today.  received the following manual therapy techniques: Soft tissue Mobilization were applied to the:  n/a for 00 minutes, including:      neuromuscular re-education activities to improve: Coordination, Proprioception, and Posture for 10 minutes. The following activities were included:  Bridging with TA 10x  Clamshell S/L 2 x 10      therapeutic activities to improve functional performance for 00  minutes, including:      PATIENT EDUCATION AND HOME EXERCISES     Home Exercises Provided and Patient Education Provided     Education provided:   - Educated pt on the importance of daily stretch to increase the benefit of therapy and positive results.      Written Home Exercises Provided: Patient instructed to cont prior HEP. Exercises were reviewed and Sean was able to demonstrate them prior to the end of the session.  Sean demonstrated good  understanding of the education provided. See EMR under Patient Instructions for exercises provided during therapy sessions    ASSESSMENT     Pt with no c/o pain with session. Added pelvic/core strengthening with no pain. Encouraged EIS with prolonged sitting. Educated pt on the importance of daily stretch to increase the benefit of therapy and positive results.  Also discussed importance for upright posture and to stand every hour to decrease back/Posterior thigh pain.      Sean Is progressing well towards his goals.   Pt prognosis is Good.     Pt will continue to benefit from skilled outpatient physical therapy to address the deficits listed in the problem list box on initial evaluation, provide pt/family education and to maximize pt's level of independence in the home and community environment.     Pt's spiritual, cultural and educational needs considered and pt agreeable to plan of care and goals.     Anticipated barriers to physical therapy: none    Goals:  Short Term Goals: 4 weeks   -Improve lumbar spine AROM extension to 50% for improving mobility.  -Bilateral hip strength grossly 4-/5 or better.  -Pt will cont without falls.  -Improve FOTO impairment score to 35% for  improving QOL.     Long Term Goals: 8 weeks   -Pt able to sit for 30 mins (with/without infreq standing breaks) with leg pain 3/10 or less.  -Bilateral hip strength grossly 4-/5 or better.  -Improved/decreased urinary incontinence by 25% for improved QOL.  -right ankle strength 2/5 or better to dec fall risks.  -Left ankle strength 3-/5 or better to dec fall risks.  -Pt will ambulate without steppage gt.  -Improve FOTO impairment score to 30% for improving QOL    PLAN   Plan of care Certification: 3/13/2023 to 5/12/2023.     Outpatient Physical Therapy 2 times weekly for 8 weeks to include the following interventions: Cervical/Lumbar Traction, Electrical Stimulation IFC, Gait Training, Manual Therapy, Moist Heat/ Ice, Neuromuscular Re-ed, Patient Education, Self Care, Therapeutic Activities, Therapeutic Exercise, Ultrasound, and dry needling .       Nisha Banegas, PTA

## 2023-03-24 ENCOUNTER — CLINICAL SUPPORT (OUTPATIENT)
Dept: REHABILITATION | Facility: HOSPITAL | Age: 83
End: 2023-03-24
Attending: FAMILY MEDICINE
Payer: MEDICARE

## 2023-03-24 DIAGNOSIS — R29.898 WEAKNESS OF BOTH LOWER EXTREMITIES: Primary | ICD-10-CM

## 2023-03-24 DIAGNOSIS — R26.9 ABNORMAL GAIT: ICD-10-CM

## 2023-03-24 DIAGNOSIS — Z91.81 PERSONAL HISTORY OF FALL: ICD-10-CM

## 2023-03-24 DIAGNOSIS — M79.651 PAIN OF RIGHT THIGH: ICD-10-CM

## 2023-03-24 PROCEDURE — 97110 THERAPEUTIC EXERCISES: CPT | Mod: PO | Performed by: PHYSICAL THERAPIST

## 2023-03-24 PROCEDURE — 97112 NEUROMUSCULAR REEDUCATION: CPT | Mod: PO | Performed by: PHYSICAL THERAPIST

## 2023-03-24 NOTE — PROGRESS NOTES
[facility]   Physical Therapy Treatment Note     Name: Sean IRWIN Jose David  [facility] Number: [id number]    Therapy Diagnosis:   Encounter Diagnoses   Name Primary?    Weakness of both lower extremities Yes    Personal history of fall     Pain of right thigh     Abnormal gait      Physician: Stevenson Garland MD    Visit Date: 3/24/2023  Physician Orders: PT Eval and Treat   Post Surgical? No Eval and Treat Yes Type of Therapy Outpatient Therapy   Medical Diagnosis from Referral: S76.311A (ICD-10-CM) - Hamstring strain, right, initial encounter   Evaluation Date: 3/13/2023  Authorization Period Expiration: 12/31/2023  Plan of Care Expiration: 5/12/2023  Visit # / Visits authorized: 1/ 20     Time In: 755  Time Out: 900  Total Billable Time: 58 minutes     Precautions: Standard, Fall, and cancer, He has passed out at home once. He fell prior to his beginning to walk. His last fall was 2 yrs ago.  SUBJECTIVE     Pt reports: having R HS pain with sitting. He feels the EIS is helping decrease his pain. No pain at the present moment. He is about the same. He does report difficulty remembering names most and other things. He has less difficulty with remembering numbers. This has been over the last 3 yrs. Began at [age] yo. Difficulty buttoning shirt Left>right. Decreasing post thigh pain.  He was compliant with home exercise program.  Response to previous treatment: well with no increased pain.  Functional change: same    Pain: 0/10  Location: right Hamstring area      OBJECTIVE     Objective Measures updated at progress report unless specified.     Babinski: NEGATIVE BILATERAL   Clonus: NEGATIVE BILATERAL    BICEP REFLEX: 2+ BILATERAL   BRACHIORADIALIS REFLEX: 2+ BILATERAL  TRICEP REFLEX: 2+ BILATERAL     strength: 4/5 bilateral     Unable to heel walk.  Toe walk very difficult.    TREATMENT     Sean received the treatments listed below:      received therapeutic exercises to develop  "strength and ROM for 22 minutes including:    LONG ARC QUAD 4 x 10  SLR 2 x 10 each  (Side lying hip Abduction 2 x 10 each next visit)  (Side lying clams 2x10 next visit)  Standing hip extension 2 x 10  Standing HSC 2 x 10  (HS stretching 3 x 30" next visit)  Hook lying piriformis stretch 3 x 30"  Fig 4 piriformis stretch 3 x 30"    received the following manual therapy techniques: Soft tissue Mobilization were applied to the: n/a for 00 minutes, including:    neuromuscular re-education activities to improve: Coordination, Proprioception, and Posture for 38 minutes. The following activities were included:  Bridging with TA 10x  Prone lying on elbows x 2 mins   (Prone press up next visit)  Toe raises in sitting x 30 (twitch response noted only and other m. Compensation likely)  EIS at counter 4x10  Slouch over correct 4x10  Thoracic stretch over chair 5 x 20"    (Scapular retract, no money, shoulder extension next visit)    See objective for additional neuro testing.    therapeutic activities to improve functional performance for 00  minutes, including:    PATIENT EDUCATION AND HOME EXERCISES     Home Exercises Provided and Patient Education Provided     Education provided:   - Educated pt on the importance of daily stretch to increase the benefit of therapy and positive results.    - Avoid trunk lean as compensation for leg weakness.    Written Home Exercises Provided: Patient instructed to cont prior HEP. Exercises were reviewed and Sean was able to demonstrate them prior to the end of the session.  Sean demonstrated good  understanding of the education provided. See EMR under Patient Instructions for exercises provided during therapy sessions    ASSESSMENT     Pt with no c/o pain with session. Progressed additional postural and LE strengthening today. Babinski, Clonus, reflex testing neg. Findings do not indicate a neuro disease or UMN concern at this time. LE weakness is apparent. CK values are WNL per chart " review of lab values. His ant tib atrophy and steppage gait is significant. Steppage gait appears mildly improved. Pt encouraged to monitor sx for worsening and hand guttering as he has some atrophy there and reports trouble holding things, not dropping though. Pt may benefit from mechanical traction when appropriate. Pt will benefit from gait training as strength improves for gt safety and eliminate learned compensation techniques.    Sean Is progressing well towards his goals.   Pt prognosis is Good.     Pt will continue to benefit from skilled outpatient physical therapy to address the deficits listed in the problem list box on initial evaluation, provide pt/family education and to maximize pt's level of independence in the home and community environment.     Pt's spiritual, cultural and educational needs considered and pt agreeable to plan of care and goals.     Anticipated barriers to physical therapy: none    Goals:  Short Term Goals: 4 weeks   -Improve lumbar spine AROM extension to 50% for improving mobility.  -Bilateral hip strength grossly 4-/5 or better.  -Pt will cont without falls.  -Improve FOTO impairment score to 35% for improving QOL.     Long Term Goals: 8 weeks   -Pt able to sit for 30 mins (with/without infreq standing breaks) with leg pain 3/10 or less.  -Bilateral hip strength grossly 4-/5 or better.  -Improved/decreased urinary incontinence by 25% for improved QOL.  -right ankle strength 2/5 or better to dec fall risks.  -Left ankle strength 3-/5 or better to dec fall risks.  -Pt will ambulate without steppage gt.  -Improve FOTO impairment score to 30% for improving QOL    PLAN   Plan of care Certification: 3/13/2023 to 5/12/2023.     Outpatient Physical Therapy 2 times weekly for 8 weeks to include the following interventions: Cervical/Lumbar Traction, Electrical Stimulation IFC, Gait Training, Manual Therapy, Moist Heat/ Ice, Neuromuscular Re-ed, Patient Education, Self Care, Therapeutic  Activities, Therapeutic Exercise, Ultrasound, and dry needling .       Cori Garcia, PT

## 2023-03-27 NOTE — PROGRESS NOTES
[facility]   Physical Therapy Treatment Note     Name: Sean Main  [facility] Number: [id number]    Therapy Diagnosis:   Encounter Diagnoses   Name Primary?    Weakness of both lower extremities Yes    Personal history of fall     Pain of right thigh     Abnormal gait      Physician: Stevenson Garland MD    Visit Date: 3/28/2023  Physician Orders: PT Eval and Treat   Post Surgical? No Eval and Treat Yes Type of Therapy Outpatient Therapy   Medical Diagnosis from Referral: S76.311A (ICD-10-CM) - Hamstring strain, right, initial encounter   Evaluation Date: 3/13/2023  Authorization Period Expiration: 12/31/2023  Plan of Care Expiration: 5/12/2023  Visit # / Visits authorized: 3/ 20     Time In: 1100  Time Out: 1200  Total Billable Time: 58 minutes     Precautions: Standard, Fall, and cancer, He has passed out at home once. He fell prior to his beginning to walk. His last fall was 2 yrs ago.  SUBJECTIVE     Pt reports: having R HS pain with sitting. He feels the EIS is helping decrease his pain. No pain at the present moment. He believes that he is having less freq intense pain with sitting. He does report difficulty remembering names most and other things. He has less difficulty with remembering numbers. This has been over the last 3 yrs. Began at 78-81 yo. Difficulty buttoning shirt Left>right. Decreasing post thigh pain. He was able to recall 3 numbers told to him at the end of session. Dec taste reported.  He was compliant with home exercise program.  Response to previous treatment: well with no increased pain.  Functional change: same    Pain: 0/10  Location: right Hamstring area      OBJECTIVE     Objective Measures updated at progress report unless specified.     His tongue does move in strange patterns while talking. But he demos control with tongue protrusion, deviation, and elevation. He demos good hand to thigh slap, opposite foot-shin glides, and finger to nose coordination.  "Pt w/ weakness and cannot lift toes, but heel raises with speed and alternating are good.     TREATMENT     Sean received the treatments listed below:      received therapeutic exercises to develop strength and ROM for 30 minutes including:    LONG ARC QUAD 4 x 10  SLR 2 x 10 each  Side lying hip Abduction x 10 each  Side lying clams 2x10   Standing hip extension 2 x 10-np  Standing HSC 2 x 10-np  (HS stretching 3 x 30" next visit)-np  Hook lying piriformis stretch 3 x 30"  Fig 4 piriformis stretch 3 x 30"    received the following manual therapy techniques: Soft tissue Mobilization were applied to the: n/a for 00 minutes, including:    neuromuscular re-education activities to improve: Coordination, Proprioception, and Posture for 25 minutes. The following activities were included:  Bridging with TA 10x  Prone lying on elbows x 2 mins   Prone press up x 10    Not performed:  Toe raises in sitting x 30 (twitch response noted only and other m. Compensation likely)  EIS at counter 4x10  Slouch over correct 4x10  Thoracic stretch over chair 5 x 20"    (Scapular retract, no money, shoulder extension next visit)    See objective for additional neuro testing.    therapeutic activities to improve functional performance for 00  minutes, including:    PATIENT EDUCATION AND HOME EXERCISES     Home Exercises Provided and Patient Education Provided     Education provided:   - Educated pt on the importance of daily stretch to increase the benefit of therapy and positive results.    - Avoid trunk lean as compensation for leg weakness.    Written Home Exercises Provided: Patient instructed to cont prior HEP. Exercises were reviewed and Sean was able to demonstrate them prior to the end of the session.  Sean demonstrated good  understanding of the education provided. See EMR under Patient Instructions for exercises provided during therapy sessions    ASSESSMENT     Pt with no c/o pain with session. Progressed additional " postural and LE strengthening today. LE weakness is apparent. His ant tib atrophy and steppage gait is significant. Steppage gait appears mildly improved. He reports that his taste has decreased causing a change in appetite. His tongue appears with difficulty coordinating during speech. Pt began talking to therapist. Therapist with difficulty understanding him and pt forgetting what he was going to state. Pt may benefit from mechanical traction when appropriate. Pt will benefit from gait training as strength improves for gt safety and eliminate learned compensation techniques. Will monitor and observe for more clear direction. Continue strengthening.    Sean Is progressing well towards his goals.   Pt prognosis is Good.     Pt will continue to benefit from skilled outpatient physical therapy to address the deficits listed in the problem list box on initial evaluation, provide pt/family education and to maximize pt's level of independence in the home and community environment.     Pt's spiritual, cultural and educational needs considered and pt agreeable to plan of care and goals.     Anticipated barriers to physical therapy: none    Goals: progressing to all  Short Term Goals: 4 weeks   -Improve lumbar spine AROM extension to 50% for improving mobility.  -Bilateral hip strength grossly 4-/5 or better.  -Pt will cont without falls.  -Improve FOTO impairment score to 35% for improving QOL.     Long Term Goals: 8 weeks   -Pt able to sit for 30 mins (with/without infreq standing breaks) with leg pain 3/10 or less.  -Bilateral hip strength grossly 4-/5 or better.  -Improved/decreased urinary incontinence by 25% for improved QOL.  -right ankle strength 2/5 or better to dec fall risks.  -Left ankle strength 3-/5 or better to dec fall risks.  -Pt will ambulate without steppage gt.  -Improve FOTO impairment score to 30% for improving QOL    PLAN   Plan of care Certification: 3/13/2023 to 5/12/2023.     Outpatient Physical  Therapy 2 times weekly for 8 weeks to include the following interventions: Cervical/Lumbar Traction, Electrical Stimulation IFC, Gait Training, Manual Therapy, Moist Heat/ Ice, Neuromuscular Re-ed, Patient Education, Self Care, Therapeutic Activities, Therapeutic Exercise, Ultrasound, and dry needling .       Cori Garcia, PT

## 2023-03-28 ENCOUNTER — CLINICAL SUPPORT (OUTPATIENT)
Dept: REHABILITATION | Facility: HOSPITAL | Age: 83
End: 2023-03-28
Attending: FAMILY MEDICINE
Payer: MEDICARE

## 2023-03-28 DIAGNOSIS — Z91.81 PERSONAL HISTORY OF FALL: ICD-10-CM

## 2023-03-28 DIAGNOSIS — M79.651 PAIN OF RIGHT THIGH: ICD-10-CM

## 2023-03-28 DIAGNOSIS — R29.898 WEAKNESS OF BOTH LOWER EXTREMITIES: Primary | ICD-10-CM

## 2023-03-28 DIAGNOSIS — R26.9 ABNORMAL GAIT: ICD-10-CM

## 2023-03-28 PROCEDURE — 97110 THERAPEUTIC EXERCISES: CPT | Mod: PO | Performed by: PHYSICAL THERAPIST

## 2023-03-28 PROCEDURE — 97112 NEUROMUSCULAR REEDUCATION: CPT | Mod: PO | Performed by: PHYSICAL THERAPIST

## 2023-03-30 ENCOUNTER — CLINICAL SUPPORT (OUTPATIENT)
Dept: REHABILITATION | Facility: HOSPITAL | Age: 83
End: 2023-03-30
Attending: FAMILY MEDICINE
Payer: MEDICARE

## 2023-03-30 DIAGNOSIS — R26.9 ABNORMAL GAIT: ICD-10-CM

## 2023-03-30 DIAGNOSIS — M79.651 PAIN OF RIGHT THIGH: ICD-10-CM

## 2023-03-30 DIAGNOSIS — Z91.81 PERSONAL HISTORY OF FALL: ICD-10-CM

## 2023-03-30 DIAGNOSIS — R29.898 WEAKNESS OF BOTH LOWER EXTREMITIES: Primary | ICD-10-CM

## 2023-03-30 PROCEDURE — 97110 THERAPEUTIC EXERCISES: CPT | Mod: PO,CQ

## 2023-03-30 PROCEDURE — 97112 NEUROMUSCULAR REEDUCATION: CPT | Mod: PO,CQ

## 2023-03-30 NOTE — PROGRESS NOTES
[facility]   Physical Therapy Treatment Note     Name: Sean Main  [facility] Number: [id number]    Therapy Diagnosis:   Encounter Diagnoses   Name Primary?    Weakness of both lower extremities Yes    Personal history of fall     Pain of right thigh     Abnormal gait        Physician: Stevenson Garland MD    Visit Date: 3/30/2023  Physician Orders: PT Eval and Treat   Post Surgical? No Eval and Treat Yes Type of Therapy Outpatient Therapy   Medical Diagnosis from Referral: S76.311A (ICD-10-CM) - Hamstring strain, right, initial encounter   Evaluation Date: 3/13/2023  Authorization Period Expiration: 12/31/2023  Plan of Care Expiration: 5/12/2023  Visit # / Visits authorized: 4/ 20     Time In: 8:30  Time Out: 9:15  Total Billable Time: 45 minutes     Precautions: Standard, Fall, and cancer, He has passed out at home once. He fell prior to his beginning to walk. His last fall was 2 yrs ago.  SUBJECTIVE     Pt reports: having R HS pain with sitting. He feels the EIS is helping decrease his pain. No pain at the present moment. He believes that he is having less freq intense pain with sitting.  He was compliant with home exercise program.  Response to previous treatment: well with no increased pain.  Functional change: same    Pain: 0/10  Location: right Hamstring area      OBJECTIVE     Objective Measures updated at progress report unless specified.     His tongue does move in strange patterns while talking. But he demos control with tongue protrusion, deviation, and elevation. He demos good hand to thigh slap, opposite foot-shin glides, and finger to nose coordination. Pt w/ weakness and cannot lift toes, but heel raises with speed and alternating are good.     TREATMENT     Sean received the treatments listed below:      received therapeutic exercises to develop strength and ROM for 30 minutes including:    LONG ARC QUAD 4 x 10  SLR 2 x 10 each  Side lying hip Abduction x 10  "each  Side lying clams 2x10   Standing hip extension 2 x 10-np  Standing HSC 2 x 10-np  (HS stretching 3 x 30" next visit)-np  Hook lying piriformis stretch 3 x 30"  Fig 4 piriformis stretch 3 x 30"  Toe raises in sitting x 30 (twitch response noted only and other m. Compensation likely)  received the following manual therapy techniques: Soft tissue Mobilization were applied to the: n/a for 00 minutes, including:    neuromuscular re-education activities to improve: Coordination, Proprioception, and Posture for 25 minutes. The following activities were included:  Bridging with TA 10x  Prone lying on elbows x 2 mins   Prone press up x 10    Not performed:  EIS at counter 4x10  Slouch over correct 4x10  Thoracic stretch over chair 5 x 20"    (Scapular retract, no money, shoulder extension next visit)    See objective for additional neuro testing.    therapeutic activities to improve functional performance for 00  minutes, including:    PATIENT EDUCATION AND HOME EXERCISES     Home Exercises Provided and Patient Education Provided     Education provided:   - Educated pt on the importance of daily stretch to increase the benefit of therapy and positive results.    - Avoid trunk lean as compensation for leg weakness.    Written Home Exercises Provided: Patient instructed to cont prior HEP. Exercises were reviewed and Sean was able to demonstrate them prior to the end of the session.  Sean demonstrated good  understanding of the education provided. See EMR under Patient Instructions for exercises provided during therapy sessions    ASSESSMENT     Pt with no c/o pain with session.He did demo less high knee steppage gait, but continues to have B foot drop. Encouraged him to cont with prone lying and EIS post prolonged sitting. Will monitor and observe foot drop over time and plan tx in a more clear direction. Continue strengthening.    Sean Is progressing well towards his goals.   Pt prognosis is Good.     Pt will " continue to benefit from skilled outpatient physical therapy to address the deficits listed in the problem list box on initial evaluation, provide pt/family education and to maximize pt's level of independence in the home and community environment.     Pt's spiritual, cultural and educational needs considered and pt agreeable to plan of care and goals.     Anticipated barriers to physical therapy: none    Goals: progressing to all  Short Term Goals: 4 weeks   -Improve lumbar spine AROM extension to 50% for improving mobility.  -Bilateral hip strength grossly 4-/5 or better.  -Pt will cont without falls.  -Improve FOTO impairment score to 35% for improving QOL.     Long Term Goals: 8 weeks   -Pt able to sit for 30 mins (with/without infreq standing breaks) with leg pain 3/10 or less.  -Bilateral hip strength grossly 4-/5 or better.  -Improved/decreased urinary incontinence by 25% for improved QOL.  -right ankle strength 2/5 or better to dec fall risks.  -Left ankle strength 3-/5 or better to dec fall risks.  -Pt will ambulate without steppage gt.  -Improve FOTO impairment score to 30% for improving QOL    PLAN   Plan of care Certification: 3/13/2023 to 5/12/2023.     Outpatient Physical Therapy 2 times weekly for 8 weeks to include the following interventions: Cervical/Lumbar Traction, Electrical Stimulation IFC, Gait Training, Manual Therapy, Moist Heat/ Ice, Neuromuscular Re-ed, Patient Education, Self Care, Therapeutic Activities, Therapeutic Exercise, Ultrasound, and dry needling .       Nisha Banegas, PTA

## 2023-03-31 ENCOUNTER — EXTERNAL CHRONIC CARE MANAGEMENT (OUTPATIENT)
Dept: PRIMARY CARE CLINIC | Facility: CLINIC | Age: 83
End: 2023-03-31
Payer: MEDICARE

## 2023-03-31 PROCEDURE — 99490 CHRNC CARE MGMT STAFF 1ST 20: CPT | Mod: PBBFAC,PO | Performed by: FAMILY MEDICINE

## 2023-03-31 PROCEDURE — 99439 PR CHRONIC CARE MGMT, EA ADDTL 20 MIN: ICD-10-PCS | Mod: S$PBB,,, | Performed by: FAMILY MEDICINE

## 2023-03-31 PROCEDURE — 99439 CHRNC CARE MGMT STAF EA ADDL: CPT | Mod: PBBFAC,27,PO | Performed by: FAMILY MEDICINE

## 2023-03-31 PROCEDURE — 99490 PR CHRONIC CARE MGMT, 1ST 20 MIN: ICD-10-PCS | Mod: S$PBB,,, | Performed by: FAMILY MEDICINE

## 2023-03-31 PROCEDURE — 99490 CHRNC CARE MGMT STAFF 1ST 20: CPT | Mod: S$PBB,,, | Performed by: FAMILY MEDICINE

## 2023-03-31 PROCEDURE — 99439 CHRNC CARE MGMT STAF EA ADDL: CPT | Mod: S$PBB,,, | Performed by: FAMILY MEDICINE

## 2023-04-03 ENCOUNTER — TELEPHONE (OUTPATIENT)
Dept: ADMINISTRATIVE | Facility: CLINIC | Age: 83
End: 2023-04-03
Payer: MEDICARE

## 2023-04-10 ENCOUNTER — CLINICAL SUPPORT (OUTPATIENT)
Dept: REHABILITATION | Facility: HOSPITAL | Age: 83
End: 2023-04-10
Attending: FAMILY MEDICINE
Payer: MEDICARE

## 2023-04-10 DIAGNOSIS — R26.9 ABNORMAL GAIT: ICD-10-CM

## 2023-04-10 DIAGNOSIS — Z91.81 PERSONAL HISTORY OF FALL: ICD-10-CM

## 2023-04-10 DIAGNOSIS — M79.651 PAIN OF RIGHT THIGH: ICD-10-CM

## 2023-04-10 DIAGNOSIS — R29.898 WEAKNESS OF BOTH LOWER EXTREMITIES: Primary | ICD-10-CM

## 2023-04-10 PROCEDURE — 97112 NEUROMUSCULAR REEDUCATION: CPT | Mod: PO | Performed by: PHYSICAL THERAPIST

## 2023-04-10 NOTE — PROGRESS NOTES
"  [facility]   Physical Therapy Treatment Note     Name: Sean Main  [facility] Number: [id number]    Therapy Diagnosis:   Encounter Diagnoses   Name Primary?    Weakness of both lower extremities Yes    Personal history of fall     Pain of right thigh     Abnormal gait        Physician: Stevenson Garland MD    Visit Date: 4/10/2023  Physician Orders: PT Eval and Treat   Post Surgical? No Eval and Treat Yes Type of Therapy Outpatient Therapy   Medical Diagnosis from Referral: S76.311A (ICD-10-CM) - Hamstring strain, right, initial encounter   Evaluation Date: 3/13/2023  Authorization Period Expiration: 12/31/2023  Plan of Care Expiration: 5/12/2023  Visit # / Visits authorized: 5/ 20     Time In: 9:00  Time Out: 10:00  Total Billable Time: 30 minutes     Precautions: Standard, Fall, and cancer, He has passed out at home once. He fell prior to his beginning to walk. His last fall was 2 yrs ago.    SUBJECTIVE     Pt reports: less posterior thigh pain.  He was compliant with home exercise program.  Response to previous treatment: well with no increased pain.  Functional change: same    Pain: 0/10  Location: right Hamstring area      OBJECTIVE     Objective Measures updated at progress report unless specified.     TREATMENT     Sean received the treatments listed below:      received therapeutic exercises to develop strength and ROM for 30 minutes including:    LONG ARC QUAD 4 x 10  SLR 2 x 10 each-np  Side lying hip Abduction x 10 each  Side lying clams 2x10  Standing hip extension 2 x 10  Standing HSC 2 x 10  HS stretching 3 x 30"  Hook lying piriformis stretch 3 x 30"  Fig 4 piriformis stretch 3 x 30"  Toe raises in sitting x 30 (twitch response noted only and other m. Compensation likely)  Heel raises in sitting 2x10 with self resistance    received the following manual therapy techniques: Soft tissue Mobilization were applied to the: n/a for 00 minutes, including:    neuromuscular " "re-education activities to improve: Coordination, Proprioception, and Posture for 25 minutes. The following activities were included:  Bridging with TA 2 x 10  Prone lying on elbows x 2 mins   Prone press up 2 x 10  Slouch over correct 2x10  Sciatic nerve glides 90/90 x 20    Not performed:  EIS at counter 4x10  Thoracic stretch over chair 5 x 20"    (Scapular retract, no money, shoulder extension next visit)    See objective for additional neuro testing.    therapeutic activities to improve functional performance for 00  minutes, including:    PATIENT EDUCATION AND HOME EXERCISES     Home Exercises Provided and Patient Education Provided     Education provided:   - Educated pt on the importance of daily stretch to increase the benefit of therapy and positive results.    - Avoid trunk lean as compensation for leg weakness.    Written Home Exercises Provided: Patient instructed to cont prior HEP. Exercises were reviewed and Sean was able to demonstrate them prior to the end of the session.  Sean demonstrated good  understanding of the education provided. See EMR under Patient Instructions for exercises provided during therapy sessions    ASSESSMENT     Pt with no c/o pain with session.He did demo less high knee steppage gait, but continues to have B foot drop. Encouraged him to cont with prone lying and EIS post prolonged sitting. Will monitor and observe foot drop over time and plan tx in a more clear direction. Continue strengthening.    Sean Is progressing well towards his goals.   Pt prognosis is Good.     Pt will continue to benefit from skilled outpatient physical therapy to address the deficits listed in the problem list box on initial evaluation, provide pt/family education and to maximize pt's level of independence in the home and community environment.     Pt's spiritual, cultural and educational needs considered and pt agreeable to plan of care and goals.     Anticipated barriers to physical therapy: " none    Goals: progressing to all  Short Term Goals: 4 weeks   -Improve lumbar spine AROM extension to 50% for improving mobility.  -Bilateral hip strength grossly 4-/5 or better.  -Pt will cont without falls.  -Improve FOTO impairment score to 35% for improving QOL.     Long Term Goals: 8 weeks   -Pt able to sit for 30 mins (with/without infreq standing breaks) with leg pain 3/10 or less.  -Bilateral hip strength grossly 4-/5 or better.  -Improved/decreased urinary incontinence by 25% for improved QOL.  -right ankle strength 2/5 or better to dec fall risks.  -Left ankle strength 3-/5 or better to dec fall risks.  -Pt will ambulate without steppage gt.  -Improve FOTO impairment score to 30% for improving QOL    PLAN   Plan of care Certification: 3/13/2023 to 5/12/2023.     Outpatient Physical Therapy 2 times weekly for 8 weeks to include the following interventions: Cervical/Lumbar Traction, Electrical Stimulation IFC, Gait Training, Manual Therapy, Moist Heat/ Ice, Neuromuscular Re-ed, Patient Education, Self Care, Therapeutic Activities, Therapeutic Exercise, Ultrasound, and dry needling .       Cori Garcia, PT

## 2023-04-13 ENCOUNTER — CLINICAL SUPPORT (OUTPATIENT)
Dept: REHABILITATION | Facility: HOSPITAL | Age: 83
End: 2023-04-13
Attending: FAMILY MEDICINE
Payer: MEDICARE

## 2023-04-13 DIAGNOSIS — R29.898 WEAKNESS OF BOTH LOWER EXTREMITIES: Primary | ICD-10-CM

## 2023-04-13 DIAGNOSIS — Z91.81 PERSONAL HISTORY OF FALL: ICD-10-CM

## 2023-04-13 DIAGNOSIS — R26.9 ABNORMAL GAIT: ICD-10-CM

## 2023-04-13 DIAGNOSIS — M79.651 PAIN OF RIGHT THIGH: ICD-10-CM

## 2023-04-13 PROCEDURE — 97110 THERAPEUTIC EXERCISES: CPT | Mod: PO | Performed by: PHYSICAL THERAPIST

## 2023-04-13 NOTE — PROGRESS NOTES
[facility]   Reassessment: Physical Therapy Treatment Note     Name: Sean Main  [facility] Number: [id number]    Therapy Diagnosis:   Encounter Diagnoses   Name Primary?    Weakness of both lower extremities Yes    Personal history of fall     Pain of right thigh     Abnormal gait        Physician: Stevenson Garland MD    Visit Date: 4/13/2023  Physician Orders: PT Eval and Treat   Post Surgical? No Eval and Treat Yes Type of Therapy Outpatient Therapy   Medical Diagnosis from Referral: S76.311A (ICD-10-CM) - Hamstring strain, right, initial encounter   Evaluation Date: 3/13/2023  Authorization Period Expiration: 12/31/2023  Plan of Care Expiration: 5/12/2023  Visit # / Visits authorized: 6/ 20     Time In: 11:00  Time Out: 12:00  Total Billable Time: 55 minutes     Precautions: Standard, Fall, and cancer, He has passed out at home once. He fell prior to his beginning to walk. His last fall was 2 yrs ago.    SUBJECTIVE     Pt reports: less posterior thigh pain and he is able to see some twitch when he attempts ankle DF.  He was compliant with home exercise program.  Response to previous treatment: well with no increased pain.  Functional change: same    Pain: 0/10  Location: right Hamstring area      OBJECTIVE     Objective Measures updated at progress report unless specified.   Observation: Dec activation of tibia ant and knee extension is noted.-steppage gt.     Posture: impaired    Lumbar spine AROM:  Flexion: 50%  Extension: 25%  Lat flexion right: 75%  Lat flexion Left: 75%     Lower Extremity Strength  Right LE   Left LE     Knee extension: 5/5 Knee extension: 4+/5   Knee flexion: 4+/5 Knee flexion: 4/5   Hip flexion: 4/5 Hip flexion: 4-/5   Hip Internal Rotation:  4/5    Hip Internal Rotation: 4/5      Hip External Rotation: 4+/5    Hip External Rotation: 4+/5      Hip extension:  4-/5 Hip extension: 3+/5   Hip abduction: 4/5 Hip abduction: 4/5   Hip adduction: 3+/5 Hip  "adduction 4-/5   Ankle dorsiflexion: trace to no Ankle dorsiflexion: trace   Ankle plantarflexion: 4/5 Ankle plantarflexion: 4/5      HS length: -same  Right: 60 deg   Left: 70 deg     CMS Impairment/Limitation/Restriction for FOTO hip Survey     Therapist reviewed FOTO scores for Sean Main on 3/13/2023.   FOTO documents entered into [employer] - see Media section.     Limitation Score: 40%  Goal: 28%  4/13/2023: 33%        TREATMENT     Sean received the treatments listed below:      received therapeutic exercises to develop strength and ROM for 30 minutes including:    LONG ARC QUAD 4 x 10  SLR 3 x 10 each  Side lying hip Abduction x 10 each  Reassessment.     Not performed d/t reassessment:  Side lying clams 2x10  Standing hip extension 2 x 10  Standing HSC 2 x 10  HS stretching 3 x 30"  Hook lying piriformis stretch 3 x 30"  Fig 4 piriformis stretch 3 x 30"  Toe raises in sitting x 30 (twitch response noted only and other m. Compensation likely)  Heel raises in sitting 2x10 with self resistance    received the following manual therapy techniques: Soft tissue Mobilization were applied to the: n/a for 00 minutes, including:    neuromuscular re-education activities to improve: Coordination, Proprioception, and Posture for 25 minutes. The following activities were included:  Bridging with TA 2 x 10  EIS at counter 4x10  Prone lying on elbows x 2 mins   Prone press up 2 x 10  Slouch over correct 2 x10  Sciatic nerve glides 90/90 x 20  Thoracic stretch over chair 5 x 20"  Sitting central and peripheral nerve flossing x 20  Scapular retract 3 x 10  No money 3 x 10   Shoulder extension 3 x 10    therapeutic activities to improve functional performance for 00  minutes, including:  Sit to stand    PATIENT EDUCATION AND HOME EXERCISES     Home Exercises Provided and Patient Education Provided     Education provided:   - Educated pt on the importance of daily stretch to increase the benefit of therapy and positive " results.    - Avoid trunk lean as compensation for leg weakness.    Written Home Exercises Provided: Patient instructed to cont prior HEP. Exercises were reviewed and Sean was able to demonstrate them prior to the end of the session.  Sean demonstrated good  understanding of the education provided. See EMR under Patient Instructions for exercises provided during therapy sessions    ASSESSMENT     Pt with no c/o pain with session. He demos less hip flexion during gt. Strength improving right>Left. He continues with atrophy about the tib ant bilateral and unable to DF feet. Lumbar AROM is mildly improved. 2/4 STGs and 2/7 LTGs met. Cont to goals, improved gt, mobility, and safety.    Sean Is progressing well towards his goals.   Pt prognosis is Good.     Pt will continue to benefit from skilled outpatient physical therapy to address the deficits listed in the problem list box on initial evaluation, provide pt/family education and to maximize pt's level of independence in the home and community environment.     Pt's spiritual, cultural and educational needs considered and pt agreeable to plan of care and goals.     Anticipated barriers to physical therapy: none    Goals: progressing to all  Short Term Goals: 4 weeks   -Improve lumbar spine AROM extension to 50% for improving mobility.   -ongoing  -Bilateral hip strength grossly 4-/5 or better.  -ongoing  -Pt will cont without falls.   -MET  -Improve FOTO impairment score to 35% for improving QOL.    -MET    Long Term Goals: 8 weeks  -ongoing  -Pt able to sit for 30 mins (with/without infreq standing breaks) with leg pain 3/10 or less.   -MET, NO PAIN  -Bilateral hip strength grossly 4/5 or better.   -ongoing  -Improved/decreased urinary urgency by 25% for improved QOL.   -MET, 40%  -right ankle strength 2/5 or better to dec fall risks.  -Left ankle strength 3-/5 or better to dec fall risks.  -Pt will ambulate without steppage gt.  -Improve FOTO impairment score  to 30% for improving QOL    PLAN   Plan of care Certification: 3/13/2023 to 5/12/2023.     Outpatient Physical Therapy 2 times weekly for 8 weeks to include the following interventions: Cervical/Lumbar Traction, Electrical Stimulation IFC, Gait Training, Manual Therapy, Moist Heat/ Ice, Neuromuscular Re-ed, Patient Education, Self Care, Therapeutic Activities, Therapeutic Exercise, Ultrasound, and dry needling .       Cori Garcia, PT

## 2023-04-18 ENCOUNTER — CLINICAL SUPPORT (OUTPATIENT)
Dept: REHABILITATION | Facility: HOSPITAL | Age: 83
End: 2023-04-18
Attending: FAMILY MEDICINE
Payer: MEDICARE

## 2023-04-18 DIAGNOSIS — R26.9 ABNORMAL GAIT: ICD-10-CM

## 2023-04-18 DIAGNOSIS — M79.651 PAIN OF RIGHT THIGH: ICD-10-CM

## 2023-04-18 DIAGNOSIS — Z91.81 PERSONAL HISTORY OF FALL: ICD-10-CM

## 2023-04-18 DIAGNOSIS — R29.898 WEAKNESS OF BOTH LOWER EXTREMITIES: Primary | ICD-10-CM

## 2023-04-18 PROCEDURE — 97110 THERAPEUTIC EXERCISES: CPT | Mod: PO,CQ

## 2023-04-18 PROCEDURE — 97112 NEUROMUSCULAR REEDUCATION: CPT | Mod: PO,CQ

## 2023-04-18 NOTE — PROGRESS NOTES
[facility]   Physical Therapy Treatment Note     Name: Sean Main  [facility] Number: [id number]    Therapy Diagnosis:   Encounter Diagnoses   Name Primary?    Weakness of both lower extremities Yes    Personal history of fall     Pain of right thigh     Abnormal gait          Physician: Stevenson Garland MD    Visit Date: 4/18/2023  Physician Orders: PT Eval and Treat   Post Surgical? No Eval and Treat Yes Type of Therapy Outpatient Therapy   Medical Diagnosis from Referral: S76.311A (ICD-10-CM) - Hamstring strain, right, initial encounter   Evaluation Date: 3/13/2023  Authorization Period Expiration: 12/31/2023  Plan of Care Expiration: 5/12/2023  Visit # / Visits authorized: 7/ 20     Time In: 10:45  Time Out: 11:30  Total Billable Time: 45 minutes     Precautions: Standard, Fall, and cancer, He has passed out at home once. He fell prior to his beginning to walk. His last fall was 2 yrs ago.    SUBJECTIVE     Pt reports: less posterior thigh pain when sitting. He feels his LE's are functioning better and more stable.   He was compliant with home exercise program.  Response to previous treatment: well with no increased pain.  Functional change: Less LE pain with sitting    Pain: 0/10  Location: right Hamstring area      OBJECTIVE     Objective Measures updated at progress report unless specified.   Observation: Dec activation of tibia ant and knee extension is noted.-steppage gt.     Posture: impaired    Lumbar spine AROM:  Flexion: 50%  Extension: 25%  Lat flexion right: 75%  Lat flexion Left: 75%     Lower Extremity Strength  Right LE   Left LE     Knee extension: 5/5 Knee extension: 4+/5   Knee flexion: 4+/5 Knee flexion: 4/5   Hip flexion: 4/5 Hip flexion: 4-/5   Hip Internal Rotation:  4/5    Hip Internal Rotation: 4/5      Hip External Rotation: 4+/5    Hip External Rotation: 4+/5      Hip extension:  4-/5 Hip extension: 3+/5   Hip abduction: 4/5 Hip abduction: 4/5   Hip  "adduction: 3+/5 Hip adduction 4-/5   Ankle dorsiflexion: trace to no Ankle dorsiflexion: trace   Ankle plantarflexion: 4/5 Ankle plantarflexion: 4/5      HS length: -same  Right: 60 deg   Left: 70 deg     CMS Impairment/Limitation/Restriction for FOTO hip Survey     Therapist reviewed FOTO scores for Sean Main on 3/13/2023.   FOTO documents entered into [employer] - see Media section.     Limitation Score: 40%  Goal: 28%  4/13/2023: 33%        TREATMENT     Sean received the treatments listed below:      received therapeutic exercises to develop strength and ROM for 15 minutes including:    LONG ARC QUAD 4 x 10 (NP)  SLR 3 x 10 each  Side lying hip Abduction 2 x 10 each      Not performed:  Side lying clams 2x10  Standing hip extension 2 x 10  Standing HSC 2 x 10  HS stretching 3 x 30"  Hook lying piriformis stretch 3 x 30"  Fig 4 piriformis stretch 3 x 30"  Heel raises in sitting 2x10 with self resistance    received the following manual therapy techniques: Soft tissue Mobilization were applied to the: n/a for 00 minutes, including:    neuromuscular re-education activities to improve: Coordination, Proprioception, and Posture for 30 minutes. The following activities were included:  Bridging with TA 2 x 10 5"  EIS at counter 4x10  Prone lying on elbows x 2 mins   Prone press up 2 x 10  Slouch over correct 2 x10  Sciatic nerve glides 90/90 x 20  Thoracic stretch over chair 5 x 20"  Sitting central and peripheral nerve flossing x 20 (NP)  Scapular retract 3 x 10  No money 3 x 10 R TB  Shoulder extension 3 x 10 (NP)  Toe raises in sitting x 10 (twitch response noted only and other m. Compensation likely)  Lumbar extension machine 40# 2 x 10     therapeutic activities to improve functional performance for 00  minutes, including:  Sit to stand    PATIENT EDUCATION AND HOME EXERCISES     Home Exercises Provided and Patient Education Provided     Education provided:   - Educated pt on the importance of daily stretch " to increase the benefit of therapy and positive results.    - Avoid trunk lean as compensation for leg weakness.    Written Home Exercises Provided: Patient instructed to cont prior HEP. Exercises were reviewed and Sean was able to demonstrate them prior to the end of the session.  Sean demonstrated good  understanding of the education provided. See EMR under Patient Instructions for exercises provided during therapy sessions    ASSESSMENT   Pt tolerated session well with without complaints. He required correct tech with all pelvic strengthening. Pt demo weakness with hip abd and unable to engage compliant glute contraction even with vc and tactile cueing given.  He continues with atrophy with  tib ant bilateral and unable to DF feet. Cont to goals, improved gt, mobility, and safety. Overall, pt demo less hip flexion and pt feeling more stable with gait.     Sean Is progressing well towards his goals.   Pt prognosis is Good.     Pt will continue to benefit from skilled outpatient physical therapy to address the deficits listed in the problem list box on initial evaluation, provide pt/family education and to maximize pt's level of independence in the home and community environment.     Pt's spiritual, cultural and educational needs considered and pt agreeable to plan of care and goals.     Anticipated barriers to physical therapy: none    Goals: progressing to all  Short Term Goals: 4 weeks   -Improve lumbar spine AROM extension to 50% for improving mobility.   -ongoing  -Bilateral hip strength grossly 4-/5 or better.  -ongoing  -Pt will cont without falls.   -MET  -Improve FOTO impairment score to 35% for improving QOL.    -MET    Long Term Goals: 8 weeks  -ongoing  -Pt able to sit for 30 mins (with/without infreq standing breaks) with leg pain 3/10 or less.   -MET, NO PAIN  -Bilateral hip strength grossly 4/5 or better.   -ongoing  -Improved/decreased urinary urgency by 25% for improved QOL.   -MET,  40%  -right ankle strength 2/5 or better to dec fall risks.  -Left ankle strength 3-/5 or better to dec fall risks.  -Pt will ambulate without steppage gt.  -Improve FOTO impairment score to 30% for improving QOL    PLAN   Plan of care Certification: 3/13/2023 to 5/12/2023.     Outpatient Physical Therapy 2 times weekly for 8 weeks to include the following interventions: Cervical/Lumbar Traction, Electrical Stimulation IFC, Gait Training, Manual Therapy, Moist Heat/ Ice, Neuromuscular Re-ed, Patient Education, Self Care, Therapeutic Activities, Therapeutic Exercise, Ultrasound, and dry needling .       Nisha Banegas, PTA

## 2023-04-21 ENCOUNTER — CLINICAL SUPPORT (OUTPATIENT)
Dept: REHABILITATION | Facility: HOSPITAL | Age: 83
End: 2023-04-21
Attending: FAMILY MEDICINE
Payer: MEDICARE

## 2023-04-21 DIAGNOSIS — R29.898 WEAKNESS OF BOTH LOWER EXTREMITIES: Primary | ICD-10-CM

## 2023-04-21 DIAGNOSIS — Z91.81 PERSONAL HISTORY OF FALL: ICD-10-CM

## 2023-04-21 DIAGNOSIS — M79.651 PAIN OF RIGHT THIGH: ICD-10-CM

## 2023-04-21 DIAGNOSIS — R26.9 ABNORMAL GAIT: ICD-10-CM

## 2023-04-21 PROCEDURE — 97112 NEUROMUSCULAR REEDUCATION: CPT | Mod: PO,CQ

## 2023-04-21 PROCEDURE — 97110 THERAPEUTIC EXERCISES: CPT | Mod: PO,CQ

## 2023-04-21 NOTE — PROGRESS NOTES
[facility]   Physical Therapy Treatment Note     Name: Sean Main  [facility] Number: [id number]    Therapy Diagnosis:   Encounter Diagnoses   Name Primary?    Weakness of both lower extremities Yes    Personal history of fall     Pain of right thigh     Abnormal gait            Physician: Stevenson Garland MD    Visit Date: 4/21/2023  Physician Orders: PT Eval and Treat   Post Surgical? No Eval and Treat Yes Type of Therapy Outpatient Therapy   Medical Diagnosis from Referral: S76.311A (ICD-10-CM) - Hamstring strain, right, initial encounter   Evaluation Date: 3/13/2023  Authorization Period Expiration: 12/31/2023  Plan of Care Expiration: 5/12/2023  Visit # / Visits authorized: 8/ 20     Time In: 1:45  Time Out: 2:40  Total Billable Time: 45 minutes     Precautions: Standard, Fall, and cancer, He has passed out at home once. He fell prior to his beginning to walk. His last fall was 2 yrs ago.    SUBJECTIVE     Pt reports: less posterior thigh pain when sitting. He feels his LE are more stable. He was compliant with home exercise program.  Response to previous treatment: well with no increased pain.  Functional change: Less LE pain with sitting    Pain: 0/10  Location: right Hamstring area      OBJECTIVE     Objective Measures updated at progress report unless specified.   Observation: Dec activation of tibia ant and knee extension is noted.-steppage gt.     Posture: impaired    Lumbar spine AROM:  Flexion: 50%  Extension: 25%  Lat flexion right: 75%  Lat flexion Left: 75%     Lower Extremity Strength  Right LE   Left LE     Knee extension: 5/5 Knee extension: 4+/5   Knee flexion: 4+/5 Knee flexion: 4/5   Hip flexion: 4/5 Hip flexion: 4-/5   Hip Internal Rotation:  4/5    Hip Internal Rotation: 4/5      Hip External Rotation: 4+/5    Hip External Rotation: 4+/5      Hip extension:  4-/5 Hip extension: 3+/5   Hip abduction: 4/5 Hip abduction: 4/5   Hip adduction: 3+/5 Hip  "adduction 4-/5   Ankle dorsiflexion: trace to no Ankle dorsiflexion: trace   Ankle plantarflexion: 4/5 Ankle plantarflexion: 4/5      HS length: -same  Right: 60 deg   Left: 70 deg     CMS Impairment/Limitation/Restriction for FOTO hip Survey     Therapist reviewed FOTO scores for Sean Main on 3/13/2023.   FOTO documents entered into [employer] - see Media section.     Limitation Score: 40%  Goal: 28%  4/13/2023: 33%        TREATMENT     Sean received the treatments listed below:      received therapeutic exercises to develop strength and ROM for 15 minutes including:  Biked 10 mins  Calf stretch on wedge 3x 30 sec  LONG ARC QUAD 4 x 10 (NP)  SLR 3 x 10 each      Not performed:  Standing hip extension 2 x 10  Standing HSC 2 x 10  HS stretching 3 x 30"  Hook lying piriformis stretch 3 x 30"  Fig 4 piriformis stretch 3 x 30"  Heel raises in sitting 2x10 with self resistance  No money 3 x 10 R TB (NP)  Shoulder extension 3 x 10 (NP)  Thoracic stretch over chair 5 x 20" (NP)  Sitting central and peripheral nerve flossing x 20 (NP)  Scapular retract 3 x 10    received the following manual therapy techniques: Soft tissue Mobilization were applied to the: n/a for 00 minutes, including:    neuromuscular re-education activities to improve: Coordination, Proprioception, and Posture for 30 minutes. The following activities were included:  Bridging with TA 2 x 10 5"  Side lying hip Abduction 2 x 10 each  Side lying clams 2x10 R TB   EIS at counter 4x10  Prone lying on elbows x 2 mins   Prone press up 2 x 10  Slouch over correct 2 x10  Sciatic nerve glides 90/90 x 20 (NP)  Toe raises in sitting x 10 (twitch response noted only and other m. Compensation likely)  Lumbar extension machine 46# 2 x 10   Leg press 2 x 10x  20#  Step ups/down  10x B encouraging DF of ankle     therapeutic activities to improve functional performance for 00  minutes, including:  Sit to stand    PATIENT EDUCATION AND HOME EXERCISES     Home " Exercises Provided and Patient Education Provided     Education provided:   - Educated pt on the importance of daily stretch to increase the benefit of therapy and positive results.    - Avoid trunk lean as compensation for leg weakness.    Written Home Exercises Provided: Patient instructed to cont prior HEP. Exercises were reviewed and Sean was able to demonstrate them prior to the end of the session.  Sean demonstrated good  understanding of the education provided. See EMR under Patient Instructions for exercises provided during therapy sessions    ASSESSMENT   Pt tolerated session well with without complaints. He required correct tech with all pelvic strengthening. Added TB to clamshell and d/c hip abd SL at this time due to pt unable to demo with correct muscle recruitment. He continues with atrophy with  tib ant bilateral and unable to DF feet. Cont to goals, improved gt, mobility, and safety. Overall, pt demo less hip flexion and pt feeling more stable with gait. Did discuss the use of B AFO and the importance to use in the future if he conts to have weak foot DF. Pt understood.     Sean Is progressing well towards his goals.   Pt prognosis is Good.     Pt will continue to benefit from skilled outpatient physical therapy to address the deficits listed in the problem list box on initial evaluation, provide pt/family education and to maximize pt's level of independence in the home and community environment.     Pt's spiritual, cultural and educational needs considered and pt agreeable to plan of care and goals.     Anticipated barriers to physical therapy: none    Goals: progressing to all  Short Term Goals: 4 weeks   -Improve lumbar spine AROM extension to 50% for improving mobility.   -ongoing  -Bilateral hip strength grossly 4-/5 or better.  -ongoing  -Pt will cont without falls.   -MET  -Improve FOTO impairment score to 35% for improving QOL.    -MET    Long Term Goals: 8 weeks  -ongoing  -Pt able to  sit for 30 mins (with/without infreq standing breaks) with leg pain 3/10 or less.   -MET, NO PAIN  -Bilateral hip strength grossly 4/5 or better.   -ongoing  -Improved/decreased urinary urgency by 25% for improved QOL.   -MET, 40%  -right ankle strength 2/5 or better to dec fall risks.  -Left ankle strength 3-/5 or better to dec fall risks.  -Pt will ambulate without steppage gt.  -Improve FOTO impairment score to 30% for improving QOL    PLAN   Plan of care Certification: 3/13/2023 to 5/12/2023.     Outpatient Physical Therapy 2 times weekly for 8 weeks to include the following interventions: Cervical/Lumbar Traction, Electrical Stimulation IFC, Gait Training, Manual Therapy, Moist Heat/ Ice, Neuromuscular Re-ed, Patient Education, Self Care, Therapeutic Activities, Therapeutic Exercise, Ultrasound, and dry needling .       Nisha Banegas, PTA

## 2023-04-25 ENCOUNTER — CLINICAL SUPPORT (OUTPATIENT)
Dept: REHABILITATION | Facility: HOSPITAL | Age: 83
End: 2023-04-25
Attending: FAMILY MEDICINE
Payer: MEDICARE

## 2023-04-25 DIAGNOSIS — Z91.81 PERSONAL HISTORY OF FALL: ICD-10-CM

## 2023-04-25 DIAGNOSIS — R29.898 WEAKNESS OF BOTH LOWER EXTREMITIES: Primary | ICD-10-CM

## 2023-04-25 DIAGNOSIS — R26.9 ABNORMAL GAIT: ICD-10-CM

## 2023-04-25 DIAGNOSIS — M79.651 PAIN OF RIGHT THIGH: ICD-10-CM

## 2023-04-25 PROCEDURE — 97112 NEUROMUSCULAR REEDUCATION: CPT | Mod: PO,CQ

## 2023-04-25 NOTE — PROGRESS NOTES
[facility]   Physical Therapy Treatment Note     Name: Sean Main  [facility] Number: [id number]    Therapy Diagnosis:   Encounter Diagnoses   Name Primary?    Weakness of both lower extremities Yes    Personal history of fall     Pain of right thigh     Abnormal gait              Physician: Stevenson Garland MD    Visit Date: 4/25/2023  Physician Orders: PT Eval and Treat   Post Surgical? No Eval and Treat Yes Type of Therapy Outpatient Therapy   Medical Diagnosis from Referral: S76.311A (ICD-10-CM) - Hamstring strain, right, initial encounter   Evaluation Date: 3/13/2023  Authorization Period Expiration: 12/31/2023  Plan of Care Expiration: 5/12/2023  Visit # / Visits authorized: 9/ 20     Time In: 11:32  Time Out: 12:30  Total Billable Time: 45 minutes     Precautions: Standard, Fall, and cancer, He has passed out at home once. He fell prior to his beginning to walk. His last fall was 2 yrs ago.    SUBJECTIVE     Pt reports: thigh stiffness in the morning when waking up. As he goes on with his day it gets better. He feels his feet up motion is getting better.  He was compliant with home exercise program.  Response to previous treatment: well with no increased pain.  Functional change: Less LE pain with sitting    Pain: 0/10  Location: right Hamstring area      OBJECTIVE     Objective Measures updated at progress report unless specified.   Observation: Dec activation of tibia ant and knee extension is noted.-steppage gt.     Posture: impaired    Lumbar spine AROM:  Flexion: 50%  Extension: 25%  Lat flexion right: 75%  Lat flexion Left: 75%     Lower Extremity Strength  Right LE   Left LE     Knee extension: 5/5 Knee extension: 4+/5   Knee flexion: 4+/5 Knee flexion: 4/5   Hip flexion: 4/5 Hip flexion: 4-/5   Hip Internal Rotation:  4/5    Hip Internal Rotation: 4/5      Hip External Rotation: 4+/5    Hip External Rotation: 4+/5      Hip extension:  4-/5 Hip extension: 3+/5  "  Hip abduction: 4/5 Hip abduction: 4/5   Hip adduction: 3+/5 Hip adduction 4-/5   Ankle dorsiflexion: trace to no Ankle dorsiflexion: trace   Ankle plantarflexion: 4/5 Ankle plantarflexion: 4/5      HS length: -same  Right: 60 deg   Left: 70 deg     CMS Impairment/Limitation/Restriction for FOTO hip Survey     Therapist reviewed FOTO scores for Sean Main on 3/13/2023.   FOTO documents entered into [facility] - see Media section.     Limitation Score: 40%  Goal: 28%  4/13/2023: 33%        TREATMENT     Sean received the treatments listed below:      received therapeutic exercises to develop strength and ROM for 5 minutes including:  Biked 10 mins (NP)  Calf stretch on wedge 3x 30 sec  LONG ARC QUAD 4 x 10 (NP)    Not performed:  Standing hip extension 2 x 10  Standing HSC 2 x 10  HS stretching 3 x 30"  Hook lying piriformis stretch 3 x 30"  Fig 4 piriformis stretch 3 x 30"  Heel raises in sitting 2x10 with self resistance  No money 3 x 10 R TB (NP)  Shoulder extension 3 x 10 (NP)  Thoracic stretch over chair 5 x 20" (NP)  Sitting central and peripheral nerve flossing x 20 (NP)  Scapular retract 3 x 10    received the following manual therapy techniques: Soft tissue Mobilization were applied to the: n/a for 00 minutes, including:    neuromuscular re-education activities to improve: Coordination, Proprioception, and Posture for 53 minutes. The following activities were included:  SLR 3 x 10 each encouraging DF of ankle   Bridging with TA 2 x 10 5"  Side lying hip Abduction 2 x 10 each  Side lying clams 2x10 R TB (NP)  EIS at counter 2x10  Prone lying on elbows x 2 mins   Prone press up 2 x 10  Slouch over correct 2 x10(NP)  Sciatic nerve glides 90/90 x 20 (NP)  Essentric lowering of foot in sitting w/ PTA putting foot into DF x 10 B(twitch response noted only and other m.   Lumbar extension machine 46# 2 x 10   Leg press 2 x 10x  30#  Step ups/down  10x B encouraging DF of ankle   Pt amb with ace wrap tied to " foot and leg to put foot into neutral. Giving pt a sense of normal DF with gait.   therapeutic activities to improve functional performance for 00  minutes, including:  Sit to stand     PATIENT EDUCATION AND HOME EXERCISES     Home Exercises Provided and Patient Education Provided     Education provided:   - Educated pt on the importance of daily stretch to increase the benefit of therapy and positive results.    - Avoid trunk lean as compensation for leg weakness.    Written Home Exercises Provided: Patient instructed to cont prior HEP. Exercises were reviewed and Sean was able to demonstrate them prior to the end of the session.  Sean demonstrated good  understanding of the education provided. See EMR under Patient Instructions for exercises provided during therapy sessions    ASSESSMENT   Pt tolerated session well with without complaints. He continues with atrophy with tib ant bilateral and unable to DF feet with any TE, gait of standing activities. Cont to goals, improved gt, mobility, and safety. Pt liked amb with ace wrap to assist foot into neutral and he was able to sense what it should feel like if his foot would DF with swing phase.  Did discuss the use of B AFO and the importance to use in the future if he conts to have weak foot DF. Pt understood.     Sean Is progressing well towards his goals.   Pt prognosis is Good.     Pt will continue to benefit from skilled outpatient physical therapy to address the deficits listed in the problem list box on initial evaluation, provide pt/family education and to maximize pt's level of independence in the home and community environment.     Pt's spiritual, cultural and educational needs considered and pt agreeable to plan of care and goals.     Anticipated barriers to physical therapy: none    Goals: progressing to all  Short Term Goals: 4 weeks   -Improve lumbar spine AROM extension to 50% for improving mobility.   -ongoing  -Bilateral hip strength grossly  4-/5 or better.  -ongoing  -Pt will cont without falls.   -MET  -Improve FOTO impairment score to 35% for improving QOL.    -MET    Long Term Goals: 8 weeks  -ongoing  -Pt able to sit for 30 mins (with/without infreq standing breaks) with leg pain 3/10 or less.   -MET, NO PAIN  -Bilateral hip strength grossly 4/5 or better.   -ongoing  -Improved/decreased urinary urgency by 25% for improved QOL.   -MET, 40%  -right ankle strength 2/5 or better to dec fall risks.  -Left ankle strength 3-/5 or better to dec fall risks.  -Pt will ambulate without steppage gt.  -Improve FOTO impairment score to 30% for improving QOL    PLAN   Plan of care Certification: 3/13/2023 to 5/12/2023.     Outpatient Physical Therapy 2 times weekly for 8 weeks to include the following interventions: Cervical/Lumbar Traction, Electrical Stimulation IFC, Gait Training, Manual Therapy, Moist Heat/ Ice, Neuromuscular Re-ed, Patient Education, Self Care, Therapeutic Activities, Therapeutic Exercise, Ultrasound, and dry needling .       Nisha Banegas, PTA

## 2023-04-28 ENCOUNTER — CLINICAL SUPPORT (OUTPATIENT)
Dept: REHABILITATION | Facility: HOSPITAL | Age: 83
End: 2023-04-28
Attending: FAMILY MEDICINE
Payer: MEDICARE

## 2023-04-28 DIAGNOSIS — R29.898 WEAKNESS OF BOTH LOWER EXTREMITIES: Primary | ICD-10-CM

## 2023-04-28 DIAGNOSIS — M79.651 PAIN OF RIGHT THIGH: ICD-10-CM

## 2023-04-28 DIAGNOSIS — Z91.81 PERSONAL HISTORY OF FALL: ICD-10-CM

## 2023-04-28 DIAGNOSIS — R26.9 ABNORMAL GAIT: ICD-10-CM

## 2023-04-28 PROCEDURE — 97112 NEUROMUSCULAR REEDUCATION: CPT | Mod: PO,CQ

## 2023-04-28 NOTE — PROGRESS NOTES
[facility]   Physical Therapy Treatment Note     Name: Sean Main  [facility] Number: [id number]    Therapy Diagnosis:   Encounter Diagnoses   Name Primary?    Weakness of both lower extremities Yes    Personal history of fall     Pain of right thigh     Abnormal gait                Physician: Stevenson Garland MD    Visit Date: 4/28/2023  Physician Orders: PT Eval and Treat   Post Surgical? No Eval and Treat Yes Type of Therapy Outpatient Therapy   Medical Diagnosis from Referral: S76.311A (ICD-10-CM) - Hamstring strain, right, initial encounter   Evaluation Date: 3/13/2023  Authorization Period Expiration: 12/31/2023  Plan of Care Expiration: 5/12/2023  Visit # / Visits authorized: 10/ 20     Time In: 11:32  Time Out: 12:15  Total Billable Time: 45 minutes     Precautions: Standard, Fall, and cancer, He has passed out at home once. He fell prior to his beginning to walk. His last fall was 2 yrs ago.    SUBJECTIVE     Pt reports: he still is unable to dorsiflex his B ankles. No pain in Hamstring area.   Response to previous treatment: well with no increased pain.  Functional change: Less LE pain with sitting    Pain: 0/10  Location: right Hamstring area      OBJECTIVE     Objective Measures updated at progress report unless specified.   Observation: Dec activation of tibia ant and knee extension is noted.-steppage gt.     Posture: impaired    Lumbar spine AROM:  Flexion: 50%  Extension: 25%  Lat flexion right: 75%  Lat flexion Left: 75%     Lower Extremity Strength  Right LE   Left LE     Knee extension: 5/5 Knee extension: 4+/5   Knee flexion: 4+/5 Knee flexion: 4/5   Hip flexion: 4/5 Hip flexion: 4-/5   Hip Internal Rotation:  4/5    Hip Internal Rotation: 4/5      Hip External Rotation: 4+/5    Hip External Rotation: 4+/5      Hip extension:  4-/5 Hip extension: 3+/5   Hip abduction: 4/5 Hip abduction: 4/5   Hip adduction: 3+/5 Hip adduction 4-/5   Ankle dorsiflexion:  "trace to no Ankle dorsiflexion: trace   Ankle plantarflexion: 4/5 Ankle plantarflexion: 4/5      HS length: -same  Right: 60 deg   Left: 70 deg     CMS Impairment/Limitation/Restriction for FOTO hip Survey     Therapist reviewed FOTO scores for Sean Main on 3/13/2023.   FOTO documents entered into [facility] - see Media section.     Limitation Score: 40%  Goal: 28%  4/13/2023: 33%        TREATMENT     Sean received the treatments listed below:      received therapeutic exercises to develop strength and ROM for 5 minutes including:  Biked 10 mins (NP)  Calf stretch on wedge 3x 30 sec  LONG ARC QUAD 4 x 10 (NP)    Not performed:  Standing hip extension 2 x 10  Standing HSC 2 x 10  HS stretching 3 x 30"  Hook lying piriformis stretch 3 x 30"  Fig 4 piriformis stretch 3 x 30"  Heel raises in sitting 2x10 with self resistance  No money 3 x 10 R TB (NP)  Shoulder extension 3 x 10 (NP)  Thoracic stretch over chair 5 x 20" (NP)  Sitting central and peripheral nerve flossing x 20 (NP)  Scapular retract 3 x 10    received the following manual therapy techniques: Soft tissue Mobilization were applied to the: n/a for 00 minutes, including:    neuromuscular re-education activities to improve: Coordination, Proprioception, and Posture for 40 minutes. The following activities were included:    NP:  SLR 3 x 10 each encouraging DF of ankle   Bridging with TA 2 x 10 5"  Side lying hip Abduction 2 x 10 ea  Side lying clams 2x10 R TB (NP)  EIS at counter 2x10  Prone lying on elbows x 2 mins   Prone press up 2 x 10  Slouch over correct 2 x10(NP)  Sciatic nerve glides 90/90 x 20 (NP)  Essentric lowering of foot in sitting w/ PTA putting foot into DF x 10 B(twitch response noted only and other m.   Lumbar extension machine 46# 2 x 10   Leg press 2 x 10x  30#  Step ups/down  10x B encouraging DF of ankle   Pt amb with ace wrap tied to foot and leg to put foot into neutral. Giving pt a sense of normal DF with gait.   therapeutic " activities to improve functional performance for 00  minutes, including:  Sit to stand     Performed:  NMES :10 mins 50pps 53mA 10/10 with strap for L foot Dorsiflexion   Contract relax:with plantarflexion and slow release and trying dorsiflexion.  Pt amb around clinic to assess if any improved DF with B ankles    PATIENT EDUCATION AND HOME EXERCISES     Home Exercises Provided and Patient Education Provided     Education provided:   - Educated pt on the importance of daily stretch to increase the benefit of therapy and positive results.    - Avoid trunk lean as compensation for leg weakness.    Written Home Exercises Provided: Patient instructed to cont prior HEP. Exercises were reviewed and Sean was able to demonstrate them prior to the end of the session.  Sean demonstrated good  understanding of the education provided. See EMR under Patient Instructions for exercises provided during therapy sessions    ASSESSMENT   Pt tolerated session well with without complaints. Pt had trace movement at anterior tibialis but, no significant change in dorsiflexion. Important with  improved gt, mobility, and safety. Pt would benefit  from seeing a neurologist for further assessment.     Sean Is progressing well towards his goals.   Pt prognosis is Good.     Pt will continue to benefit from skilled outpatient physical therapy to address the deficits listed in the problem list box on initial evaluation, provide pt/family education and to maximize pt's level of independence in the home and community environment.     Pt's spiritual, cultural and educational needs considered and pt agreeable to plan of care and goals.     Anticipated barriers to physical therapy: none    Goals: progressing to all  Short Term Goals: 4 weeks   -Improve lumbar spine AROM extension to 50% for improving mobility.   -ongoing  -Bilateral hip strength grossly 4-/5 or better.  -ongoing  -Pt will cont without falls.   -MET  -Improve FOTO impairment  score to 35% for improving QOL.    -MET    Long Term Goals: 8 weeks  -ongoing  -Pt able to sit for 30 mins (with/without infreq standing breaks) with leg pain 3/10 or less.   -MET, NO PAIN  -Bilateral hip strength grossly 4/5 or better.   -ongoing  -Improved/decreased urinary urgency by 25% for improved QOL.   -MET, 40%  -right ankle strength 2/5 or better to dec fall risks.  -Left ankle strength 3-/5 or better to dec fall risks.  -Pt will ambulate without steppage gt.  -Improve FOTO impairment score to 30% for improving QOL    PLAN   Plan of care Certification: 3/13/2023 to 5/12/2023.     Outpatient Physical Therapy 2 times weekly for 8 weeks to include the following interventions: Cervical/Lumbar Traction, Electrical Stimulation IFC, Gait Training, Manual Therapy, Moist Heat/ Ice, Neuromuscular Re-ed, Patient Education, Self Care, Therapeutic Activities, Therapeutic Exercise, Ultrasound, and dry needling .       Nisha Banegas, PTA

## 2023-04-30 ENCOUNTER — EXTERNAL CHRONIC CARE MANAGEMENT (OUTPATIENT)
Dept: PRIMARY CARE CLINIC | Facility: CLINIC | Age: 83
End: 2023-04-30
Payer: MEDICARE

## 2023-04-30 PROCEDURE — 99490 CHRNC CARE MGMT STAFF 1ST 20: CPT | Mod: PBBFAC,PO | Performed by: FAMILY MEDICINE

## 2023-04-30 PROCEDURE — 99490 PR CHRONIC CARE MGMT, 1ST 20 MIN: ICD-10-PCS | Mod: S$PBB,,, | Performed by: FAMILY MEDICINE

## 2023-04-30 PROCEDURE — 99490 CHRNC CARE MGMT STAFF 1ST 20: CPT | Mod: S$PBB,,, | Performed by: FAMILY MEDICINE

## 2023-05-01 ENCOUNTER — TELEPHONE (OUTPATIENT)
Dept: FAMILY MEDICINE | Facility: CLINIC | Age: 83
End: 2023-05-01
Payer: MEDICARE

## 2023-05-01 ENCOUNTER — TELEPHONE (OUTPATIENT)
Dept: NEUROSURGERY | Facility: CLINIC | Age: 83
End: 2023-05-01
Payer: MEDICARE

## 2023-05-01 DIAGNOSIS — M51.36 DDD (DEGENERATIVE DISC DISEASE), LUMBAR: Primary | ICD-10-CM

## 2023-05-01 DIAGNOSIS — G95.9 MYELOPATHY: ICD-10-CM

## 2023-05-01 NOTE — TELEPHONE ENCOUNTER
Called patient to schedule a NP appointment with [facility] per referral from Stevenson Garland MD.  No answer.  LVM.  Patient has current imaging in the chart.

## 2023-05-01 NOTE — TELEPHONE ENCOUNTER
----- Message from Cori Garcia, PT sent at 5/1/2023 12:06 PM CDT -----  Regarding: PT Recommendations  Good morning,    Mr. Estrada was originally referred for right hamstring strain. He has been in PT x ~6 weeks and attended 10 session. He was walking with a steppage gait at [facility]. This is mildly improved since beginning PT with less hip flexion. Significant foot drop continues bilaterally. Tib ant strength has not improved. He had/still has significant ant atrophy bilateral and no->trace m. Activation. Nothing is seen with neuro re-ed estim. I believe based on his reports that he began falling, started walking to build strength, developed the steppage gait, and indirectly stopped his falling. This is what I gather from him and no known TAWANNA. You may remember that I recommended additional imaging. The MRI report did not show cord compression. 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 However, the above gait deviations are compensations for lack of tib ant activation. I am recommending a referral to Sp[facility] or neuro surgery. He is pain resolved/managed. So he may prefer Sp[facility] Clinic. At times, I am not certain that a neuro disease is not at play, but his labs, and PT testing are not indicative of anything other than spine relationship that I can tell. Will continue treating weakness for the time. Please advise on any actions.    Thank you,  Cori

## 2023-05-02 ENCOUNTER — CLINICAL SUPPORT (OUTPATIENT)
Dept: REHABILITATION | Facility: HOSPITAL | Age: 83
End: 2023-05-02
Attending: FAMILY MEDICINE
Payer: MEDICARE

## 2023-05-02 DIAGNOSIS — M79.651 PAIN OF RIGHT THIGH: ICD-10-CM

## 2023-05-02 DIAGNOSIS — R26.9 ABNORMAL GAIT: ICD-10-CM

## 2023-05-02 DIAGNOSIS — R29.898 WEAKNESS OF BOTH LOWER EXTREMITIES: Primary | ICD-10-CM

## 2023-05-02 DIAGNOSIS — Z91.81 PERSONAL HISTORY OF FALL: ICD-10-CM

## 2023-05-02 PROCEDURE — 97112 NEUROMUSCULAR REEDUCATION: CPT | Mod: PO | Performed by: PHYSICAL THERAPIST

## 2023-05-02 NOTE — PROGRESS NOTES
"  [facility]   Physical Therapy Treatment Note     Name: Sean Main  [facility] Number: [id number]    Therapy Diagnosis:   Encounter Diagnoses   Name Primary?    Weakness of both lower extremities Yes    Personal history of fall     Pain of right thigh     Abnormal gait      Physician: Stevenson Garland MD    Visit Date: 5/2/2023  Physician Orders: PT Eval and Treat   Post Surgical? No Eval and Treat Yes Type of Therapy Outpatient Therapy   Medical Diagnosis from Referral: S76.311A (ICD-10-CM) - Hamstring strain, right, initial encounter   Evaluation Date: 3/13/2023  Authorization Period Expiration: 12/31/2023  Plan of Care Expiration: 5/12/2023  Visit # / Visits authorized: 11/ 20     Time In: 1200  Time Out: 1300  Total Billable Time: 58 minutes     Precautions: Standard, Fall, and cancer, He has passed out at home once. He fell prior to his beginning to walk. His last fall was 2 yrs ago.    SUBJECTIVE     Pt reports: he still is unable to dorsiflex his B ankles. No pain in Hamstring area. He reports reason for referral is much improved and almost not existent. He is not interested in surgery, but will f/u as directed to see if neuro can explain the unexplained level of tib ant weakness bilaterally.  Response to previous treatment: well with no increased pain.  Functional change: Less LE pain with sitting    Pain: 0/10  Location: right Hamstring area      OBJECTIVE     Objective Measures updated at progress report unless specified.     Complete FOTO next visit.    TREATMENT     Sean received the treatments listed below:      received therapeutic exercises to develop strength and ROM for 0 minutes including:    Not performed:  Biked 10 mins (NP)  Calf stretch on wedge 3x 30 sec  LONG ARC QUAD 4 x 10 (NP)  Standing hip extension 2 x 10  Standing HSC 2 x 10  HS stretching 3 x 30"  Hook lying piriformis stretch 3 x 30"  Fig 4 piriformis stretch 3 x 30"  Heel raises in sitting 2x10 " "with self resistance  No money 3 x 10 R TB (NP)  Shoulder extension 3 x 10 (NP)  Thoracic stretch over chair 5 x 20" (NP)  Sitting central and peripheral nerve flossing x 20 (NP)  Scapular retract 3 x 10    received the following manual therapy techniques: Soft tissue Mobilization were applied to the: n/a for 00 minutes, including:    neuromuscular re-education activities to improve: Coordination, Proprioception, and Posture for 60 minutes. The following activities were included:    -Gt training with and without ace wrap application into ankle DF and neutral-EV for proprioceptive feedback x 15 mins using mirror/verbal, visual, and tactile cues   -Standing hip Abduction with RTB each using mirror with verbal, visual, and tactile cues 2 x 10 each  -SINGLE LEG STANCE 6 x 30 s using HHA assistance with focus on level pelvis, proper glut med activation, using mirror/verbal, visual, and tactile cues  -Education on recommendations made to Dr. Garland with findings discussed. Encouraged pt to at least go through process for additional testing Neurosurgery may offer. Will cont LE gt training, NM, and strengthening to improve safety and fall prevention. X 15 mins  -Lateral step up 4" 2 x 10 each    NP:  SLR 3 x 10 each encouraging DF of ankle   Bridging with TA 2 x 10 5"  Side lying hip Abduction 2 x 10 ea  Side lying clams 2x10 R TB (NP)  EIS at counter 2x10  Prone lying on elbows x 2 mins   Prone press up 2 x 10  Slouch over correct 2 x10(NP)  Sciatic nerve glides 90/90 x 20 (NP)  Essentric lowering of foot in sitting w/ PTA putting foot into DF x 10 B(twitch response noted only and other m.   Lumbar extension machine 46# 2 x 10   Leg press 2 x 10x  30#  Step ups/down  10x B encouraging DF of ankle   Pt amb with ace wrap tied to foot and leg to put foot into neutral. Giving pt a sense of normal DF with gait.   therapeutic activities to improve functional performance for 00  minutes, including:  Sit to stand "     Performed:  NMES :10 mins 50pps 53mA 10/10 with strap for L foot Dorsiflexion   Contract relax:with plantarflexion and slow release and trying dorsiflexion.  Pt amb around clinic to assess if any improved DF with B ankles    PATIENT EDUCATION AND HOME EXERCISES     Home Exercises Provided and Patient Education Provided     Education provided:   - Educated pt on the importance of daily stretch to increase the benefit of therapy and positive results.    - Avoid trunk lean as compensation for leg weakness.    Written Home Exercises Provided: Patient instructed to cont prior HEP. Exercises were reviewed and Sean was able to demonstrate them prior to the end of the session.  Sean demonstrated good  understanding of the education provided. See EMR under Patient Instructions for exercises provided during therapy sessions    ASSESSMENT   Pt referred to neuro surgery for significant ant tib weakness without TAWANNA. His gt is less steppage and he has not fallen. 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 His postural and gt awareness is very low. Will cont to address strength and neuro deficits.    Sean Is progressing well towards his goals.   Pt prognosis is Good.     Pt will continue to benefit from skilled outpatient physical therapy to address the deficits listed in the problem list box on initial evaluation, provide pt/family education and to maximize pt's level of independence in the home and community environment.     Pt's spiritual, cultural and educational needs considered and pt agreeable to plan of care and goals.     Anticipated barriers to physical therapy: none    Goals: progressing to all  Short Term Goals: 4 weeks   -Improve lumbar spine AROM extension to 50% for improving mobility.   -ongoing  -Bilateral hip strength grossly 4-/5 or better.  -ongoing  -Pt will cont without falls.   -MET  -Improve FOTO impairment score to 35% for improving QOL.    -MET    Long Term Goals:  8 weeks  -ongoing  -Pt able to sit for 30 mins (with/without infreq standing breaks) with leg pain 3/10 or less.   -MET, NO PAIN  -Bilateral hip strength grossly 4/5 or better.   -ongoing  -Improved/decreased urinary urgency by 25% for improved QOL.   -MET, 40%  -right ankle strength 2/5 or better to dec fall risks.  -Left ankle strength 3-/5 or better to dec fall risks.  -Pt will ambulate without steppage gt.  -Improve FOTO impairment score to 30% for improving QOL    PLAN   Plan of care Certification: 3/13/2023 to 5/12/2023.     Outpatient Physical Therapy 2 times weekly for 8 weeks to include the following interventions: Cervical/Lumbar Traction, Electrical Stimulation IFC, Gait Training, Manual Therapy, Moist Heat/ Ice, Neuromuscular Re-ed, Patient Education, Self Care, Therapeutic Activities, Therapeutic Exercise, Ultrasound, and dry needling .       Cori Garcia, PT

## 2023-05-04 ENCOUNTER — CLINICAL SUPPORT (OUTPATIENT)
Dept: REHABILITATION | Facility: HOSPITAL | Age: 83
End: 2023-05-04
Attending: FAMILY MEDICINE
Payer: MEDICARE

## 2023-05-04 DIAGNOSIS — Z91.81 PERSONAL HISTORY OF FALL: ICD-10-CM

## 2023-05-04 DIAGNOSIS — R29.898 WEAKNESS OF BOTH LOWER EXTREMITIES: Primary | ICD-10-CM

## 2023-05-04 DIAGNOSIS — M79.651 PAIN OF RIGHT THIGH: ICD-10-CM

## 2023-05-04 DIAGNOSIS — R26.9 ABNORMAL GAIT: ICD-10-CM

## 2023-05-04 PROCEDURE — 97112 NEUROMUSCULAR REEDUCATION: CPT | Mod: PO,CQ

## 2023-05-04 NOTE — PROGRESS NOTES
"  [facility]   Physical Therapy Treatment Note     Name: Sean Main  [facility] Number: [id number]    Therapy Diagnosis:   Encounter Diagnoses   Name Primary?    Weakness of both lower extremities Yes    Personal history of fall     Pain of right thigh     Abnormal gait        Physician: Stevenson Garland MD    Visit Date: 5/4/2023  Physician Orders: PT Eval and Treat   Post Surgical? No Eval and Treat Yes Type of Therapy Outpatient Therapy   Medical Diagnosis from Referral: S76.311A (ICD-10-CM) - Hamstring strain, right, initial encounter   Evaluation Date: 3/13/2023  Authorization Period Expiration: 12/31/2023  Plan of Care Expiration: 5/12/2023  Visit # / Visits authorized: 12/ 20     Time In: 11:30 am  Time Out: 1:15 am  Total Billable Time: 45 minutes     Precautions: Standard, Fall, and cancer, He has passed out at home once. He fell prior to his beginning to walk. His last fall was 2 yrs ago.    SUBJECTIVE     Pt reports: he still is unable to dorsiflex his B ankles. No pain in Hamstring area.  Response to previous treatment: well with no increased pain.  Functional change: Less LE pain with sitting    Pain: 0/10  Location: right Hamstring area      OBJECTIVE     Objective Measures updated at progress report unless specified.     Complete FOTO next visit.    TREATMENT     Sean received the treatments listed below:      received therapeutic exercises to develop strength and ROM for 0 minutes including:    Not performed:  Biked 10 mins (NP)  Calf stretch on wedge 3x 30 sec  LONG ARC QUAD 4 x 10 (NP)  Standing hip extension 2 x 10  Standing HSC 2 x 10  HS stretching 3 x 30"  Hook lying piriformis stretch 3 x 30"  Fig 4 piriformis stretch 3 x 30"  Heel raises in sitting 2x10 with self resistance  No money 3 x 10 R TB (NP)  Shoulder extension 3 x 10 (NP)  Thoracic stretch over chair 5 x 20" (NP)  Sitting central and peripheral nerve flossing x 20 (NP)  Scapular retract 3 x " "10    received the following manual therapy techniques: Soft tissue Mobilization were applied to the: n/a for 00 minutes, including:    neuromuscular re-education activities to improve: Coordination, Proprioception, and Posture for 30 minutes. The following activities were included:  -Standing hip Abduction with RTB each using mirror with verbal, visual, and tactile cues 2 x 10 each    NP:  -Gt training with and without ace wrap application into ankle DF and neutral-EV for proprioceptive feedback x 15 mins using mirror/verbal, visual, and tactile cues   -SINGLE LEG STANCE 6 x 30 s using HHA assistance with focus on level pelvis, proper glut med activation, using mirror/verbal, visual, and tactile cues  -Education on recommendations made to Dr. Garland with findings discussed. Encouraged pt to at least go through process for additional testing Neurosurgery may offer. Will cont LE gt training, NM, and strengthening to improve safety and fall prevention. X 15 mins  -Lateral step up 4" 2 x 10 each    NP:  SLR 3 x 10 each encouraging DF of ankle   Bridging with TA 2 x 10 5"  Side lying hip Abduction 2 x 10 ea  Side lying clams 2x10 R TB (NP)  EIS at counter 2x10  Prone lying on elbows x 2 mins   Prone press up 2 x 10  Slouch over correct 2 x10(NP)  Sciatic nerve glides 90/90 x 20 (NP)  Essentric lowering of foot in sitting w/ PTA putting foot into DF x 10 B(twitch response noted only and other m.   Lumbar extension machine 46# 2 x 10   Leg press 2 x 10x  30#  Step ups/down  10x B encouraging DF of ankle   Pt amb with ace wrap tied to foot and leg to put foot into neutral. Giving pt a sense of normal DF with gait.   therapeutic activities to improve functional performance for 00  minutes, including:  Sit to stand     Performed:  NMES :15 mins 50pps 50 mA 10/10 wth electrodes on R LE anterior tibialis(anterior and distal), then moved to distal to peroneals and with strap for foot for Dorsiflexion   Contract relax:with " plantarflexion and slow release and trying dorsiflexion.  Pt amb around clinic to assess if any improved DF with B ankles    PATIENT EDUCATION AND HOME EXERCISES     Home Exercises Provided and Patient Education Provided     Education provided:   - Educated pt on the importance of daily stretch to increase the benefit of therapy and positive results.    - Avoid trunk lean as compensation for leg weakness.    Written Home Exercises Provided: Patient instructed to cont prior HEP. Exercises were reviewed and Sean was able to demonstrate them prior to the end of the session.  Sean demonstrated good  understanding of the education provided. See EMR under Patient Instructions for exercises provided during therapy sessions    ASSESSMENT   Pt received NMES today to L LE to encourage ankle dorsiflexion, but only ankle/foot inversion was demo. His gt is less steppage and he has not fallen. However, he continues with drop foot. He needs increase vc/tactile cueing for correct tech for hip adb due to pt with decrease body awareness and weakness.  Will cont to address strength and neuro deficits.    Sean Is progressing well towards his goals.   Pt prognosis is Good.     Pt will continue to benefit from skilled outpatient physical therapy to address the deficits listed in the problem list box on initial evaluation, provide pt/family education and to maximize pt's level of independence in the home and community environment.     Pt's spiritual, cultural and educational needs considered and pt agreeable to plan of care and goals.     Anticipated barriers to physical therapy: none    Goals: progressing to all  Short Term Goals: 4 weeks   -Improve lumbar spine AROM extension to 50% for improving mobility.   -ongoing  -Bilateral hip strength grossly 4-/5 or better.  -ongoing  -Pt will cont without falls.   -MET  -Improve FOTO impairment score to 35% for improving QOL.    -MET    Long Term Goals: 8 weeks  -ongoing  -Pt able to sit  for 30 mins (with/without infreq standing breaks) with leg pain 3/10 or less.   -MET, NO PAIN  -Bilateral hip strength grossly 4/5 or better.   -ongoing  -Improved/decreased urinary urgency by 25% for improved QOL.   -MET, 40%  -right ankle strength 2/5 or better to dec fall risks.  -Left ankle strength 3-/5 or better to dec fall risks.  -Pt will ambulate without steppage gt.  -Improve FOTO impairment score to 30% for improving QOL    PLAN   Plan of care Certification: 3/13/2023 to 5/12/2023.     Outpatient Physical Therapy 2 times weekly for 8 weeks to include the following interventions: Cervical/Lumbar Traction, Electrical Stimulation IFC, Gait Training, Manual Therapy, Moist Heat/ Ice, Neuromuscular Re-ed, Patient Education, Self Care, Therapeutic Activities, Therapeutic Exercise, Ultrasound, and dry needling .       Nisha Banegas, PTA

## 2023-05-09 ENCOUNTER — CLINICAL SUPPORT (OUTPATIENT)
Dept: REHABILITATION | Facility: HOSPITAL | Age: 83
End: 2023-05-09
Attending: FAMILY MEDICINE
Payer: MEDICARE

## 2023-05-09 DIAGNOSIS — R29.898 WEAKNESS OF BOTH LOWER EXTREMITIES: Primary | ICD-10-CM

## 2023-05-09 DIAGNOSIS — M79.651 PAIN OF RIGHT THIGH: ICD-10-CM

## 2023-05-09 DIAGNOSIS — Z91.81 PERSONAL HISTORY OF FALL: ICD-10-CM

## 2023-05-09 DIAGNOSIS — R26.9 ABNORMAL GAIT: ICD-10-CM

## 2023-05-09 PROCEDURE — 97112 NEUROMUSCULAR REEDUCATION: CPT | Mod: PO | Performed by: PHYSICAL THERAPIST

## 2023-05-09 NOTE — PROGRESS NOTES
"  [facility]   Physical Therapy Treatment Note     Name: Sean Main  [facility] Number: [id number]    Therapy Diagnosis:   Encounter Diagnoses   Name Primary?    Weakness of both lower extremities Yes    Personal history of fall     Pain of right thigh     Abnormal gait        Physician: Stevenson Garland MD    Visit Date: 5/9/2023  Physician Orders: PT Eval and Treat   Post Surgical? No Eval and Treat Yes Type of Therapy Outpatient Therapy   Medical Diagnosis from Referral: S76.311A (ICD-10-CM) - Hamstring strain, right, initial encounter   Evaluation Date: 3/13/2023  Authorization Period Expiration: 12/31/2023  Plan of Care Expiration: 5/12/2023  Visit # / Visits authorized: 14/ 20     Time In: 1200 pm  Time Out: 1:00 pm  Total Billable Time: 35 minutes     Precautions: Standard, Fall, and cancer, He has passed out at home once. He fell prior to his beginning to walk. His last fall was 2 yrs ago.    SUBJECTIVE     Pt reports: he still is unable to dorsiflex his B ankles. No pain in Hamstring area.  Response to previous treatment: well with no increased pain.  Functional change: Less LE pain with sitting    Pain: 0/10  Location: right Hamstring area      OBJECTIVE     Objective Measures updated at progress report unless specified.     Complete FOTO next visit.    TREATMENT     Sean received the treatments listed below:      received therapeutic exercises to develop strength and ROM for 0 minutes including:    Not performed:  Biked 10 mins (NP)  Calf stretch on wedge 3x 30 sec  LONG ARC QUAD 4 x 10 (NP)  Standing hip extension 2 x 10  Standing HSC 2 x 10  HS stretching 3 x 30"  Hook lying piriformis stretch 3 x 30"  Fig 4 piriformis stretch 3 x 30"  Heel raises in sitting 2x10 with self resistance  No money 3 x 10 R TB (NP)  Shoulder extension 3 x 10 (NP)  Thoracic stretch over chair 5 x 20" (NP)  Sitting central and peripheral nerve flossing x 20 (NP)  Scapular retract 3 x " "10    received the following manual therapy techniques: Soft tissue Mobilization were applied to the: n/a for 00 minutes, including:    neuromuscular re-education activities to improve: Coordination, Proprioception, and Posture for 55 minutes. The following activities were included:  Standing hip Abduction with RTB each using mirror with verbal, visual, and tactile cues 2 x 10 each  Side lying clams 2x10 R TB (NP)  Standing heel raises with 5# db 2x10  Standing march 3# ankle wt 2 x 10    Performed:  NMES: 25 mins 50 pps 50 mA 10/10 wth electrodes on R LE posterior tib after ant not getting appropriate response.  Contract relax: with plantarflexion and slow release and trying dorsiflexion/EV.  PT unable to achieve appropriate muscle activation even with movement of electrodes.     NP:  -Gt training with and without ace wrap application into ankle DF and neutral-EV for proprioceptive feedback x 15 mins using mirror/verbal, visual, and tactile cues   -SINGLE LEG STANCE 6 x 30 s using HHA assistance with focus on level pelvis, proper glut med activation, using mirror/verbal, visual, and tactile cues  -Education on recommendations made to Dr. Garland with findings discussed. Encouraged pt to at least go through process for additional testing Neurosurgery may offer. Will cont LE gt training, NM, and strengthening to improve safety and fall prevention. X 15 mins  -Lateral step up 4" 2 x 10 each    NP:  SLR 3 x 10 each encouraging DF of ankle   Bridging with TA 2 x 10 5"  Side lying hip Abduction 2 x 10 ea  Side lying clams 2x10 R TB (NP)  EIS at counter 2x10  Prone lying on elbows x 2 mins   Prone press up 2 x 10  Slouch over correct 2 x10(NP)  Sciatic nerve glides 90/90 x 20 (NP)  Essentric lowering of foot in sitting w/ PTA putting foot into DF x 10 bilateral (twitch response noted only and other m.   Lumbar extension machine 46# 2 x 10   Leg press 2 x 10x  30#  Step ups/down  10x B encouraging DF of ankle   Pt amb " with ace wrap tied to foot and leg to put foot into neutral. Giving pt a sense of normal DF with gait.     therapeutic activities to improve functional performance for 00  minutes, including:  Sit to stand     PATIENT EDUCATION AND HOME EXERCISES     Home Exercises Provided and Patient Education Provided     Education provided:   - Educated pt on the importance of daily stretch to increase the benefit of therapy and positive results.    - Avoid trunk lean as compensation for leg weakness.    Written Home Exercises Provided: Patient instructed to cont prior HEP. Exercises were reviewed and Sean was able to demonstrate them prior to the end of the session.  Sean demonstrated good  understanding of the education provided. See EMR under Patient Instructions for exercises provided during therapy sessions    ASSESSMENT   Pt received NMES today to L LE to encourage ankle dorsiflexion, but only ankle/foot inversion was demo. His gt is less steppage and he has not fallen. However, he continues with drop foot. He needs increase vc/tactile cueing for correct tech for hip abd due to pt with decrease body awareness and weakness.  He has significantly impaired body awareness. He is not aware of what ankle DF or EV looks or feels like. Likely abandon estim. Pt continues meeting 4/11 goals. Will cont to address strength and neuro deficits.    Sean Is progressing well towards his goals.   Pt prognosis is Good.     Pt will continue to benefit from skilled outpatient physical therapy to address the deficits listed in the problem list box on initial evaluation, provide pt/family education and to maximize pt's level of independence in the home and community environment.     Pt's spiritual, cultural and educational needs considered and pt agreeable to plan of care and goals.     Anticipated barriers to physical therapy: none    Goals: progressing to all  Short Term Goals: 4 weeks   -Improve lumbar spine AROM extension to 50% for  improving mobility.   -ongoing  -Bilateral hip strength grossly 4-/5 or better.  -ongoing  -Pt will cont without falls.   -MET  -Improve FOTO impairment score to 35% for improving QOL.    -MET    Long Term Goals: 8 weeks  -ongoing  -Pt able to sit for 30 mins (with/without infreq standing breaks) with leg pain 3/10 or less.   -MET, NO PAIN  -Bilateral hip strength grossly 4/5 or better.   -ongoing  -Improved/decreased urinary urgency by 25% for improved QOL.   -MET, 40%  -right ankle strength 2/5 or better to dec fall risks.  -Left ankle strength 3-/5 or better to dec fall risks.  -Pt will ambulate without steppage gt.  -Improve FOTO impairment score to 30% for improving QOL    PLAN   Plan of care Certification: 3/13/2023 to 5/12/2023.     Outpatient Physical Therapy 2 times weekly for 8 weeks to include the following interventions: Cervical/Lumbar Traction, Electrical Stimulation IFC, Gait Training, Manual Therapy, Moist Heat/ Ice, Neuromuscular Re-ed, Patient Education, Self Care, Therapeutic Activities, Therapeutic Exercise, Ultrasound, and dry needling .       Cori Garcia, PT

## 2023-05-11 NOTE — PLAN OF CARE
[facility]  PT Plan of Care Note     Name: Sean Main  [facility] Number: [id number]    Therapy Diagnosis:   Encounter Diagnoses   Name Primary?    Weakness of both lower extremities Yes    Personal history of fall     Pain of right thigh     Abnormal gait      Physician: Stevenson Garland MD    Visit Date: 5/9/2023    Physician Orders: PT Eval and Treat   Post Surgical? No Eval and Treat Yes Type of Therapy Outpatient Therapy   Medical Diagnosis from Referral: S76.311A (ICD-10-CM) - Hamstring strain, right, initial encounter   Evaluation Date: 3/13/2023  Authorization Period Expiration: 12/31/2023  Plan of Care Expiration: 5/12/2023  Visit # / Visits authorized: 14/ 20    Precautions:  Standard, Fall, and cancer, He has passed out at home once. He fell prior to his beginning to walk. His last fall was 2 yrs ago.  Functional Level Prior to Evaluation:  high steppage gt, post thigh pain right sided.    SUBJECTIVE     Update:   Pt reports: he still is unable to dorsiflex his B ankles. No pain in Hamstring area.  Response to previous treatment: well with no increased pain.  Functional change: Less LE pain with sitting     Pain: 0/10  Location: right Hamstring area      OBJECTIVE     Update:   See goals for little change.  neuromuscular re-education activities to improve: Coordination, Proprioception, and Posture for 55 minutes. The following activities were included:  Standing hip Abduction with RTB each using mirror with verbal, visual, and tactile cues 2 x 10 each  Side lying clams 2x10 R TB (NP)  Standing heel raises with 5# db 2x10  Standing march 3# ankle wt 2 x 10     Performed:  NMES: 25 mins 50 pps 50 mA 10/10 wth electrodes on R LE posterior tib after ant not getting appropriate response.  Contract relax: with plantarflexion and slow release and trying dorsiflexion/EV.  PT unable to achieve appropriate muscle activation even with movement of electrodes.      ASSESSMENT      Update:   Pt received NMES today to L LE to encourage ankle dorsiflexion, but only ankle/foot inversion was demo. His gt is less steppage and he has not fallen. However, he continues with drop foot. He needs increase vc/tactile cueing for correct tech for hip abd due to pt with decrease body awareness and weakness.  He has significantly impaired body awareness. He is not aware of what ankle DF or EV looks or feels like. Likely abandon estim. Pt continues meeting 4/11 goals. Will cont to address strength and neuro deficits.     Sean Is progressing well towards his goals.   Pt prognosis is Good.      Pt will continue to benefit from skilled outpatient physical therapy to address the deficits listed in the problem list box on initial evaluation, provide pt/family education and to maximize pt's level of independence in the home and community environment.      Pt's spiritual, cultural and educational needs considered and pt agreeable to plan of care and goals.     Anticipated barriers to physical therapy: none    Previous Short Term Goals Status:     Short Term Goals: 4 weeks   -Improve lumbar spine AROM extension to 50% for improving mobility.              -ongoing  -Bilateral hip strength grossly 4-/5 or better.  -ongoing  -Pt will cont without falls.              -MET  -Improve FOTO impairment score to 35% for improving QOL.               -MET  New Short Term Goals Status:   Cont  Long Term Goal Status: continue per initial plan of care.  Reasons for Recertification of Therapy:     Long Term Goals: 8 weeks  -ongoing  -Pt able to sit for 30 mins (with/without infreq standing breaks) with leg pain 3/10 or less.              -MET, NO PAIN  -Bilateral hip strength grossly 4/5 or better.              -ongoing  -Improved/decreased urinary urgency by 25% for improved QOL.              -MET, 40%  -right ankle strength 2/5 or better to dec fall risks.  -Left ankle strength 3-/5 or better to dec fall risks.  -Pt will  ambulate without steppage gt.  -Improve FOTO impairment score to 30% for improving QOL    GOALS  Cont above goals.    PLAN     Updated Certification Period: 5/11/2023 to 7/7/2023   Recommended Treatment Plan: 2 times per week for 8 weeks:  Cervical/Lumbar Traction, Electrical Stimulation IFC, NMES, Gait Training, Manual Therapy, Moist Heat/ Ice, Neuromuscular Re-ed, Patient Education, Self Care, Therapeutic Activities, Therapeutic Exercise, Ultrasound, and dry needling.  Other Recommendations: marnie Garcia, PT

## 2023-05-22 ENCOUNTER — TELEPHONE (OUTPATIENT)
Dept: NEUROSURGERY | Facility: CLINIC | Age: 83
End: 2023-05-22
Payer: MEDICARE

## 2023-05-22 NOTE — TELEPHONE ENCOUNTER
Called patient to schedule a NP appt with [facility] per referral from Stevenson Garland MD.  Patient declined an appt and stated he has other things going on at the moment.

## 2023-05-24 ENCOUNTER — DOCUMENTATION ONLY (OUTPATIENT)
Dept: REHABILITATION | Facility: HOSPITAL | Age: 83
End: 2023-05-24
Payer: MEDICARE

## 2023-05-24 NOTE — PROGRESS NOTES
LM d/t NS yesterday and reminding pt of appt tomorrow. LM asking for pt update.    Cori Garcia, PT, DPT, MTC

## 2023-05-31 ENCOUNTER — EXTERNAL CHRONIC CARE MANAGEMENT (OUTPATIENT)
Dept: PRIMARY CARE CLINIC | Facility: CLINIC | Age: 83
End: 2023-05-31
Payer: MEDICARE

## 2023-05-31 PROCEDURE — 99490 CHRNC CARE MGMT STAFF 1ST 20: CPT | Mod: S$PBB,,, | Performed by: FAMILY MEDICINE

## 2023-05-31 PROCEDURE — 99490 PR CHRONIC CARE MGMT, 1ST 20 MIN: ICD-10-PCS | Mod: S$PBB,,, | Performed by: FAMILY MEDICINE

## 2023-05-31 PROCEDURE — 99490 CHRNC CARE MGMT STAFF 1ST 20: CPT | Mod: PBBFAC,PO | Performed by: FAMILY MEDICINE

## 2023-06-26 ENCOUNTER — TELEPHONE (OUTPATIENT)
Dept: FAMILY MEDICINE | Facility: CLINIC | Age: 83
End: 2023-06-26
Payer: MEDICARE

## 2023-06-26 DIAGNOSIS — Z01.00 EYE EXAM, ROUTINE: Primary | ICD-10-CM

## 2023-06-26 NOTE — TELEPHONE ENCOUNTER
Left message on recorder that Dr. Garland put in a referral to Opthalmology, he can call the clinic to schedule.

## 2023-06-30 ENCOUNTER — EXTERNAL CHRONIC CARE MANAGEMENT (OUTPATIENT)
Dept: PRIMARY CARE CLINIC | Facility: CLINIC | Age: 83
End: 2023-06-30
Payer: MEDICARE

## 2023-06-30 PROCEDURE — 99490 CHRNC CARE MGMT STAFF 1ST 20: CPT | Mod: S$PBB,,, | Performed by: FAMILY MEDICINE

## 2023-06-30 PROCEDURE — 99439 PR CHRONIC CARE MGMT, EA ADDTL 20 MIN: ICD-10-PCS | Mod: S$PBB,,, | Performed by: FAMILY MEDICINE

## 2023-06-30 PROCEDURE — 99439 CHRNC CARE MGMT STAF EA ADDL: CPT | Mod: PBBFAC,PO | Performed by: FAMILY MEDICINE

## 2023-06-30 PROCEDURE — 99439 CHRNC CARE MGMT STAF EA ADDL: CPT | Mod: S$PBB,,, | Performed by: FAMILY MEDICINE

## 2023-06-30 PROCEDURE — 99490 PR CHRONIC CARE MGMT, 1ST 20 MIN: ICD-10-PCS | Mod: S$PBB,,, | Performed by: FAMILY MEDICINE

## 2023-06-30 PROCEDURE — 99490 CHRNC CARE MGMT STAFF 1ST 20: CPT | Mod: PBBFAC,PO | Performed by: FAMILY MEDICINE

## 2023-07-31 ENCOUNTER — EXTERNAL CHRONIC CARE MANAGEMENT (OUTPATIENT)
Dept: PRIMARY CARE CLINIC | Facility: CLINIC | Age: 83
End: 2023-07-31
Payer: MEDICARE

## 2023-07-31 PROCEDURE — 99490 CHRNC CARE MGMT STAFF 1ST 20: CPT | Mod: S$PBB,,, | Performed by: FAMILY MEDICINE

## 2023-07-31 PROCEDURE — 99490 CHRNC CARE MGMT STAFF 1ST 20: CPT | Mod: PBBFAC,PO | Performed by: FAMILY MEDICINE

## 2023-07-31 PROCEDURE — 99490 PR CHRONIC CARE MGMT, 1ST 20 MIN: ICD-10-PCS | Mod: S$PBB,,, | Performed by: FAMILY MEDICINE

## 2023-08-15 ENCOUNTER — PES CALL (OUTPATIENT)
Dept: ADMINISTRATIVE | Facility: CLINIC | Age: 83
End: 2023-08-15
Payer: MEDICARE

## 2023-08-31 ENCOUNTER — EXTERNAL CHRONIC CARE MANAGEMENT (OUTPATIENT)
Dept: PRIMARY CARE CLINIC | Facility: CLINIC | Age: 83
End: 2023-08-31
Payer: MEDICARE

## 2023-08-31 PROCEDURE — 99489 CPLX CHRNC CARE EA ADDL 30: CPT | Mod: S$PBB,,, | Performed by: FAMILY MEDICINE

## 2023-08-31 PROCEDURE — 99489 CPLX CHRNC CARE EA ADDL 30: CPT | Mod: PBBFAC,PO | Performed by: FAMILY MEDICINE

## 2023-08-31 PROCEDURE — 99487 PR COMPLX CHRON CARE MGMT, 1ST HR, PER MONTH: ICD-10-PCS | Mod: S$PBB,,, | Performed by: FAMILY MEDICINE

## 2023-08-31 PROCEDURE — 99489 PR COMPLX CHRON CARE MGMT, EA ADDTL 30 MIN, PER MONTH: ICD-10-PCS | Mod: S$PBB,,, | Performed by: FAMILY MEDICINE

## 2023-08-31 PROCEDURE — 99487 CPLX CHRNC CARE 1ST 60 MIN: CPT | Mod: S$PBB,,, | Performed by: FAMILY MEDICINE

## 2023-08-31 PROCEDURE — 99487 CPLX CHRNC CARE 1ST 60 MIN: CPT | Mod: PBBFAC,25,PO | Performed by: FAMILY MEDICINE

## 2023-09-30 ENCOUNTER — EXTERNAL CHRONIC CARE MANAGEMENT (OUTPATIENT)
Dept: PRIMARY CARE CLINIC | Facility: CLINIC | Age: 83
End: 2023-09-30
Payer: MEDICARE

## 2023-09-30 PROCEDURE — 99439 PR CHRONIC CARE MGMT, EA ADDTL 20 MIN: ICD-10-PCS | Mod: S$PBB,,, | Performed by: FAMILY MEDICINE

## 2023-09-30 PROCEDURE — 99490 CHRNC CARE MGMT STAFF 1ST 20: CPT | Mod: S$PBB,,, | Performed by: FAMILY MEDICINE

## 2023-09-30 PROCEDURE — 99439 CHRNC CARE MGMT STAF EA ADDL: CPT | Mod: S$PBB,,, | Performed by: FAMILY MEDICINE

## 2023-09-30 PROCEDURE — 99490 CHRNC CARE MGMT STAFF 1ST 20: CPT | Mod: PBBFAC,PO | Performed by: FAMILY MEDICINE

## 2023-09-30 PROCEDURE — 99490 PR CHRONIC CARE MGMT, 1ST 20 MIN: ICD-10-PCS | Mod: S$PBB,,, | Performed by: FAMILY MEDICINE

## 2023-09-30 PROCEDURE — 99439 CHRNC CARE MGMT STAF EA ADDL: CPT | Mod: PBBFAC,27,PO | Performed by: FAMILY MEDICINE

## 2023-10-10 ENCOUNTER — TELEPHONE (OUTPATIENT)
Dept: FAMILY MEDICINE | Facility: CLINIC | Age: 83
End: 2023-10-10
Payer: MEDICARE

## 2023-10-10 NOTE — TELEPHONE ENCOUNTER
Called to reschedule AWV on 10/13/23- [facility] to return call to [phone]

## 2023-10-11 ENCOUNTER — PATIENT MESSAGE (OUTPATIENT)
Dept: FAMILY MEDICINE | Facility: CLINIC | Age: 83
End: 2023-10-11
Payer: MEDICARE

## 2023-10-13 ENCOUNTER — OFFICE VISIT (OUTPATIENT)
Dept: FAMILY MEDICINE | Facility: CLINIC | Age: 83
End: 2023-10-13
Payer: MEDICARE

## 2023-10-13 ENCOUNTER — LAB VISIT (OUTPATIENT)
Dept: LAB | Facility: HOSPITAL | Age: 83
End: 2023-10-13
Attending: FAMILY MEDICINE
Payer: MEDICARE

## 2023-10-13 VITALS
WEIGHT: 156.94 LBS | HEIGHT: 69 IN | RESPIRATION RATE: 18 BRPM | DIASTOLIC BLOOD PRESSURE: 60 MMHG | TEMPERATURE: 98 F | SYSTOLIC BLOOD PRESSURE: 108 MMHG | HEART RATE: 44 BPM | OXYGEN SATURATION: 97 % | BODY MASS INDEX: 23.25 KG/M2

## 2023-10-13 DIAGNOSIS — K21.9 GASTROESOPHAGEAL REFLUX DISEASE WITHOUT ESOPHAGITIS: ICD-10-CM

## 2023-10-13 DIAGNOSIS — Z12.5 PROSTATE CANCER SCREENING: ICD-10-CM

## 2023-10-13 DIAGNOSIS — M51.36 DDD (DEGENERATIVE DISC DISEASE), LUMBAR: ICD-10-CM

## 2023-10-13 DIAGNOSIS — E78.5 DYSLIPIDEMIA: ICD-10-CM

## 2023-10-13 DIAGNOSIS — E78.5 DYSLIPIDEMIA: Primary | ICD-10-CM

## 2023-10-13 LAB
ALBUMIN SERPL BCP-MCNC: 3.9 G/DL (ref 3.5–5.2)
ALP SERPL-CCNC: 76 U/L (ref 55–135)
ALT SERPL W/O P-5'-P-CCNC: 30 U/L (ref 10–44)
ANION GAP SERPL CALC-SCNC: 8 MMOL/L (ref 8–16)
AST SERPL-CCNC: 33 U/L (ref 10–40)
BASOPHILS # BLD AUTO: 0.02 K/UL (ref 0–0.2)
BASOPHILS NFR BLD: 0.3 % (ref 0–1.9)
BILIRUB SERPL-MCNC: 0.7 MG/DL (ref 0.1–1)
BUN SERPL-MCNC: 12 MG/DL (ref 8–23)
CALCIUM SERPL-MCNC: 9.3 MG/DL (ref 8.7–10.5)
CHLORIDE SERPL-SCNC: 103 MMOL/L (ref 95–110)
CHOLEST SERPL-MCNC: 138 MG/DL (ref 120–199)
CHOLEST/HDLC SERPL: 2.8 {RATIO} (ref 2–5)
CO2 SERPL-SCNC: 26 MMOL/L (ref 23–29)
COMPLEXED PSA SERPL-MCNC: 1.8 NG/ML (ref 0–4)
CREAT SERPL-MCNC: 0.9 MG/DL (ref 0.5–1.4)
DIFFERENTIAL METHOD: NORMAL
EOSINOPHIL # BLD AUTO: 0.1 K/UL (ref 0–0.5)
EOSINOPHIL NFR BLD: 0.7 % (ref 0–8)
ERYTHROCYTE [DISTWIDTH] IN BLOOD BY AUTOMATED COUNT: 12.9 % (ref 11.5–14.5)
EST. GFR  (NO RACE VARIABLE): >60 ML/MIN/1.73 M^2
GLUCOSE SERPL-MCNC: 84 MG/DL (ref 70–110)
HCT VFR BLD AUTO: 46 % (ref 40–54)
HDLC SERPL-MCNC: 49 MG/DL (ref 40–75)
HDLC SERPL: 35.5 % (ref 20–50)
HGB BLD-MCNC: 14.9 G/DL (ref 14–18)
IMM GRANULOCYTES # BLD AUTO: 0.02 K/UL (ref 0–0.04)
IMM GRANULOCYTES NFR BLD AUTO: 0.3 % (ref 0–0.5)
LDLC SERPL CALC-MCNC: 69.4 MG/DL (ref 63–159)
LYMPHOCYTES # BLD AUTO: 2 K/UL (ref 1–4.8)
LYMPHOCYTES NFR BLD: 26.3 % (ref 18–48)
MCH RBC QN AUTO: 30.6 PG (ref 27–31)
MCHC RBC AUTO-ENTMCNC: 32.4 G/DL (ref 32–36)
MCV RBC AUTO: 95 FL (ref 82–98)
MONOCYTES # BLD AUTO: 0.6 K/UL (ref 0.3–1)
MONOCYTES NFR BLD: 8.2 % (ref 4–15)
NEUTROPHILS # BLD AUTO: 4.8 K/UL (ref 1.8–7.7)
NEUTROPHILS NFR BLD: 64.2 % (ref 38–73)
NONHDLC SERPL-MCNC: 89 MG/DL
NRBC BLD-RTO: 0 /100 WBC
PLATELET # BLD AUTO: 183 K/UL (ref 150–450)
PMV BLD AUTO: 12.4 FL (ref 9.2–12.9)
POTASSIUM SERPL-SCNC: 5.1 MMOL/L (ref 3.5–5.1)
PROT SERPL-MCNC: 6.8 G/DL (ref 6–8.4)
RBC # BLD AUTO: 4.87 M/UL (ref 4.6–6.2)
SODIUM SERPL-SCNC: 137 MMOL/L (ref 136–145)
TRIGL SERPL-MCNC: 98 MG/DL (ref 30–150)
WBC # BLD AUTO: 7.42 K/UL (ref 3.9–12.7)

## 2023-10-13 PROCEDURE — 99213 OFFICE O/P EST LOW 20 MIN: CPT | Mod: PBBFAC,PO | Performed by: FAMILY MEDICINE

## 2023-10-13 PROCEDURE — 80053 COMPREHEN METABOLIC PANEL: CPT | Performed by: FAMILY MEDICINE

## 2023-10-13 PROCEDURE — 99999 PR PBB SHADOW E&M-EST. PATIENT-LVL III: CPT | Mod: PBBFAC,,, | Performed by: FAMILY MEDICINE

## 2023-10-13 PROCEDURE — 36415 COLL VENOUS BLD VENIPUNCTURE: CPT | Mod: PO | Performed by: FAMILY MEDICINE

## 2023-10-13 PROCEDURE — 99999 PR PBB SHADOW E&M-EST. PATIENT-LVL III: ICD-10-PCS | Mod: PBBFAC,,, | Performed by: FAMILY MEDICINE

## 2023-10-13 PROCEDURE — 80061 LIPID PANEL: CPT | Performed by: FAMILY MEDICINE

## 2023-10-13 PROCEDURE — 99214 PR OFFICE/OUTPT VISIT, EST, LEVL IV, 30-39 MIN: ICD-10-PCS | Mod: S$PBB,,, | Performed by: FAMILY MEDICINE

## 2023-10-13 PROCEDURE — 99214 OFFICE O/P EST MOD 30 MIN: CPT | Mod: S$PBB,,, | Performed by: FAMILY MEDICINE

## 2023-10-13 PROCEDURE — 84153 ASSAY OF PSA TOTAL: CPT | Performed by: FAMILY MEDICINE

## 2023-10-13 PROCEDURE — 85025 COMPLETE CBC W/AUTO DIFF WBC: CPT | Performed by: FAMILY MEDICINE

## 2023-10-13 NOTE — PROGRESS NOTES
Subjective:       Patient ID: Sean Main is a 82 y.o. male.    Chief Complaint: Preventative Health Care (Physical)    Here for annual exam and f/u on chronic health issues    CAD s/p CABG - following with cardiology; asa 81mg  HTN - tolerating amlodipine 5mg daily and Coreg 3.125mg 1/2 BID  HLD - tolerating Zetia 10, Lipitor 10mg daily  GERD/hiatal hernia - Protonix 40mg  Lumbar ddd - stable; pain controlled with home stretching    Exercises 3 times a week    Past Medical History:    CAD (coronary artery disease)                                 GERD (gastroesophageal reflux disease)                        Hernia of unspecified site of abdominal cavity*                 Comment:hiatal hernia    Diverticulosis                                                HTN (hypertension)                                            HLD (hyperlipidemia)                                          Past Surgical History:    CORONARY ARTERY BYPASS GRAFT                     4/4/2012        Comment:6 vessels    UPPER GASTROINTESTINAL ENDOSCOPY                 2011            Comment:hiatal hernia    COLONOSCOPY                                      2008            Comment:diverticulosis    CHOLECYSTECTOMY                                                LIVER BIOPSY                                                   Allergies:   -- No Known Drug Allergies    -- Pravastatin     --  myalgias    Social History    Marital Status:                         Social History Main Topics    Smoking Status: Never Smoker                      Smokeless Status: Never Used                        Alcohol Use: No              Drug Use: No                Current Outpatient Medications on File Prior to Visit:  amLODIPine (NORVASC) 5 MG tablet, TAKE 1 TABLET BY MOUTH ONCE DAILY IN THE EVENING, Disp: 90 tablet, Rfl: 3  aspirin (ECOTRIN) 81 MG EC tablet, Take 81 mg by mouth once daily., Disp: , Rfl:   atorvastatin (LIPITOR) 10 MG tablet, Take 1/2  (one-half) tablet by mouth once daily, Disp: 45 tablet, Rfl: 3  carvediloL (COREG) 3.125 MG tablet, TAKE 1/2 (ONE-HALF) TABLET BY MOUTH TWICE DAILY WITH FOOD, Disp: 90 tablet, Rfl: 3  ezetimibe (ZETIA) 10 mg tablet, Take 1 tablet by mouth once daily, Disp: 90 tablet, Rfl: 3  multivitamin,tx-iron-minerals Tab, Take 1 tablet by mouth once daily., Disp: , Rfl:   niacin 50 MG Tab, Take 100 mg by mouth every evening., Disp: , Rfl:   pantoprazole (PROTONIX) 40 MG tablet, Take 1 tablet by mouth once daily, Disp: 90 tablet, Rfl: 1  saw palmetto 500 MG capsule, Take 500 mg by mouth 2 (two) times daily., Disp: , Rfl:   fluticasone propionate (FLONASE) 50 mcg/actuation nasal spray, Use 1 spray(s) in each nostril once daily (Patient not taking: Reported on 10/13/2023), Disp: 16 g, Rfl: 11    No current facility-administered medications on file prior to visit.                Review of patient's family history indicates:    Cirrhosis                      Neg Hx                    Liver cancer                   Neg Hx                    Liver disease                  Neg Hx                    Colon cancer                   Neg Hx                      Hypertension  Pertinent negatives include no chest pain, headaches, neck pain, palpitations or shortness of breath.     Review of Systems   Constitutional:  Negative for appetite change, fatigue, fever and unexpected weight change.   HENT:  Negative for congestion, ear discharge, ear pain, nosebleeds, postnasal drip, rhinorrhea, sinus pressure, sore throat and trouble swallowing.    Eyes:  Negative for pain, discharge, redness and visual disturbance.   Respiratory:  Negative for apnea, cough, chest tightness, shortness of breath and wheezing.    Cardiovascular:  Negative for chest pain, palpitations and leg swelling.   Gastrointestinal:  Negative for abdominal distention, abdominal pain, blood in stool, constipation, diarrhea, nausea and vomiting.   Genitourinary:  Negative for  "difficulty urinating, dysuria, flank pain, frequency, hematuria, penile swelling (genital pain or swelling), scrotal swelling, testicular pain and urgency.   Musculoskeletal:  Negative for back pain, gait problem, joint swelling, myalgias and neck pain.   Skin:  Negative for color change, rash and wound.   Neurological:  Negative for dizziness, tremors, seizures, syncope, speech difficulty, weakness, light-headedness, numbness and headaches.   Hematological:  Negative for adenopathy. Does not bruise/bleed easily.   Psychiatric/Behavioral:  Negative for behavioral problems, confusion, hallucinations and suicidal ideas.        Objective:       /60   Pulse (!) 44   Temp 98.4 °F (36.9 °C) (Oral)   Resp 18   Ht 5' 9" (1.753 m)   Wt 71.2 kg (156 lb 15.5 oz)   SpO2 97%   BMI 23.18 kg/m²     Physical Exam  Constitutional:       General: He is not in acute distress.     Appearance: He is well-developed.   HENT:      Head: Normocephalic and atraumatic.      Right Ear: External ear normal.      Left Ear: External ear normal.      Mouth/Throat:      Pharynx: Uvula midline. No oropharyngeal exudate.   Eyes:      General: Lids are normal.      Conjunctiva/sclera: Conjunctivae normal.      Pupils: Pupils are equal, round, and reactive to light.   Neck:      Thyroid: No thyroid mass or thyromegaly.      Trachea: Phonation normal.   Cardiovascular:      Rate and Rhythm: Normal rate and regular rhythm.      Heart sounds: Normal heart sounds. No murmur heard.     No friction rub. No gallop.   Pulmonary:      Effort: Pulmonary effort is normal. No respiratory distress.      Breath sounds: Normal breath sounds. No wheezing or rales.   Abdominal:      General: Bowel sounds are normal. There is no distension.      Palpations: Abdomen is soft. There is no mass.      Tenderness: There is no abdominal tenderness. There is no guarding or rebound.   Musculoskeletal:         General: Normal range of motion.      Cervical back: Full " passive range of motion without pain, normal range of motion and neck supple.   Lymphadenopathy:      Cervical: No cervical adenopathy.   Skin:     General: Skin is warm and dry.   Neurological:      Mental Status: He is alert and oriented to person, place, and time.      Cranial Nerves: No cranial nerve deficit.      Coordination: Coordination normal.   Psychiatric:         Speech: Speech normal.         Behavior: Behavior normal.         Thought Content: Thought content normal.         Judgment: Judgment normal.         Results for orders placed or performed in visit on 03/07/22   Comprehensive Metabolic Panel   Result Value Ref Range    Sodium 143 136 - 145 mmol/L    Potassium 5.4 (H) 3.5 - 5.1 mmol/L    Chloride 106 95 - 110 mmol/L    CO2 28 23 - 29 mmol/L    Glucose 105 70 - 110 mg/dL    BUN 13 8 - 23 mg/dL    Creatinine 1.0 0.5 - 1.4 mg/dL    Calcium 9.8 8.7 - 10.5 mg/dL    Total Protein 7.5 6.0 - 8.4 g/dL    Albumin 4.2 3.5 - 5.2 g/dL    Total Bilirubin 0.7 0.1 - 1.0 mg/dL    Alkaline Phosphatase 80 55 - 135 U/L    AST 49 (H) 10 - 40 U/L    ALT 25 10 - 44 U/L    Anion Gap 9 8 - 16 mmol/L    eGFR if African American >60.0 >60 mL/min/1.73 m^2    eGFR if non African American >60.0 >60 mL/min/1.73 m^2   Lipid Panel   Result Value Ref Range    Cholesterol 141 120 - 199 mg/dL    Triglycerides 152 (H) 30 - 150 mg/dL    HDL 56 40 - 75 mg/dL    LDL Cholesterol 54.6 (L) 63.0 - 159.0 mg/dL    HDL/Cholesterol Ratio 39.7 20.0 - 50.0 %    Total Cholesterol/HDL Ratio 2.5 2.0 - 5.0    Non-HDL Cholesterol 85 mg/dL   PSA, Screening   Result Value Ref Range    PSA, Screen 2.0 0.00 - 4.00 ng/mL     Assessment:       1. Dyslipidemia    2. Prostate cancer screening    3. Gastroesophageal reflux disease without esophagitis    4. DDD (degenerative disc disease), lumbar          Plan:       Dyslipidemia  -     Comprehensive Metabolic Panel; Future; Expected date: 10/13/2023  -     Lipid Panel; Future; Expected date:  10/13/2023    Prostate cancer screening  -     PSA, Screening; Future; Expected date: 10/13/2023    Gastroesophageal reflux disease without esophagitis  -     CBC Auto Differential; Future; Expected date: 10/13/2023    DDD (degenerative disc disease), lumbar            Trial of lowering and eliminating protonix  Labs today  Counseled on regular exercise, maintenance of a healthy weight, balanced diet rich in fruits/vegetables and lean protein, and avoidance of unhealthy habits like smoking and excessive alcohol intake.  Continue present meds  Continue back stretching and lifting precautions  F/u 1 year or as needed

## 2023-10-31 ENCOUNTER — EXTERNAL CHRONIC CARE MANAGEMENT (OUTPATIENT)
Dept: PRIMARY CARE CLINIC | Facility: CLINIC | Age: 83
End: 2023-10-31
Payer: MEDICARE

## 2023-10-31 PROCEDURE — 99490 PR CHRONIC CARE MGMT, 1ST 20 MIN: ICD-10-PCS | Mod: S$PBB,,, | Performed by: FAMILY MEDICINE

## 2023-10-31 PROCEDURE — 99490 CHRNC CARE MGMT STAFF 1ST 20: CPT | Mod: PBBFAC,PO | Performed by: FAMILY MEDICINE

## 2023-10-31 PROCEDURE — 99490 CHRNC CARE MGMT STAFF 1ST 20: CPT | Mod: S$PBB,,, | Performed by: FAMILY MEDICINE

## 2023-11-30 ENCOUNTER — EXTERNAL CHRONIC CARE MANAGEMENT (OUTPATIENT)
Dept: PRIMARY CARE CLINIC | Facility: CLINIC | Age: 83
End: 2023-11-30
Payer: MEDICARE

## 2023-11-30 PROCEDURE — 99490 CHRNC CARE MGMT STAFF 1ST 20: CPT | Mod: S$PBB,,, | Performed by: FAMILY MEDICINE

## 2023-11-30 PROCEDURE — 99490 PR CHRONIC CARE MGMT, 1ST 20 MIN: ICD-10-PCS | Mod: S$PBB,,, | Performed by: FAMILY MEDICINE

## 2023-11-30 PROCEDURE — 99490 CHRNC CARE MGMT STAFF 1ST 20: CPT | Mod: PBBFAC,PO | Performed by: FAMILY MEDICINE

## 2023-12-11 ENCOUNTER — TELEPHONE (OUTPATIENT)
Dept: FAMILY MEDICINE | Facility: CLINIC | Age: 83
End: 2023-12-11
Payer: MEDICARE

## 2023-12-12 ENCOUNTER — TELEPHONE (OUTPATIENT)
Dept: ADMINISTRATIVE | Facility: CLINIC | Age: 83
End: 2023-12-12
Payer: MEDICARE

## 2023-12-14 ENCOUNTER — OFFICE VISIT (OUTPATIENT)
Dept: FAMILY MEDICINE | Facility: CLINIC | Age: 83
End: 2023-12-14
Payer: MEDICARE

## 2023-12-14 VITALS
DIASTOLIC BLOOD PRESSURE: 62 MMHG | HEIGHT: 69 IN | WEIGHT: 157.44 LBS | SYSTOLIC BLOOD PRESSURE: 116 MMHG | HEART RATE: 44 BPM | OXYGEN SATURATION: 98 % | BODY MASS INDEX: 23.32 KG/M2

## 2023-12-14 DIAGNOSIS — I70.0 ATHEROSCLEROSIS OF AORTA: ICD-10-CM

## 2023-12-14 DIAGNOSIS — I25.10 CORONARY ARTERY DISEASE INVOLVING NATIVE CORONARY ARTERY OF NATIVE HEART WITHOUT ANGINA PECTORIS: Chronic | ICD-10-CM

## 2023-12-14 DIAGNOSIS — H61.20 IMPACTED CERUMEN, UNSPECIFIED LATERALITY: ICD-10-CM

## 2023-12-14 DIAGNOSIS — I10 ESSENTIAL HYPERTENSION: Chronic | ICD-10-CM

## 2023-12-14 DIAGNOSIS — D69.2 SENILE PURPURA: ICD-10-CM

## 2023-12-14 DIAGNOSIS — E78.5 DYSLIPIDEMIA: Chronic | ICD-10-CM

## 2023-12-14 DIAGNOSIS — H91.90 DECREASED HEARING, UNSPECIFIED LATERALITY: ICD-10-CM

## 2023-12-14 DIAGNOSIS — Z00.00 ENCOUNTER FOR PREVENTIVE HEALTH EXAMINATION: Primary | ICD-10-CM

## 2023-12-14 PROCEDURE — 99999 PR PBB SHADOW E&M-EST. PATIENT-LVL V: CPT | Mod: PBBFAC,,, | Performed by: NURSE PRACTITIONER

## 2023-12-14 PROCEDURE — 99215 OFFICE O/P EST HI 40 MIN: CPT | Mod: PBBFAC,PO | Performed by: NURSE PRACTITIONER

## 2023-12-14 PROCEDURE — 99999 PR PBB SHADOW E&M-EST. PATIENT-LVL V: ICD-10-PCS | Mod: PBBFAC,,, | Performed by: NURSE PRACTITIONER

## 2023-12-14 PROCEDURE — G0439 PR MEDICARE ANNUAL WELLNESS SUBSEQUENT VISIT: ICD-10-PCS | Mod: ,,, | Performed by: NURSE PRACTITIONER

## 2023-12-14 PROCEDURE — G0439 PPPS, SUBSEQ VISIT: HCPCS | Mod: ,,, | Performed by: NURSE PRACTITIONER

## 2023-12-14 NOTE — PATIENT INSTRUCTIONS
Counseling and Referral of Other Preventative  (Italic type indicates deductible and co-insurance are waived)    Patient Name: Sean Main  Today's Date: 12/14/2023    Health Maintenance       Date Due Completion Date    Shingles Vaccine (1 of 2) Never done ---    RSV Vaccine (Age 60+ and Pregnant patients) (1 - 1-dose 60+ series) Never done ---    COVID-19 Vaccine (4 - 2023-24 season) 09/01/2023 1/6/2022    Aspirin/Antiplatelet Therapy 10/13/2024 10/13/2023    Override on 2/26/2021: Done (81mg daily)    Lipid Panel 10/13/2024 10/13/2023    TETANUS VACCINE 08/08/2026 8/8/2016 (Declined)    Override on 8/8/2016: Declined        No orders of the defined types were placed in this encounter.      The following information is provided to all patients.  This information is to help you find resources for any of the problems found today that may be affecting your health:                Living healthy guide: www.[facility].[place].gov      Understanding Diabetes: www.diabetes.org      Eating healthy: www.cdc.gov/healthyweight      CDC home safety checklist: www.cdc.gov/steadi/patient.html      Agency on Aging: www.goea.[place].gov      Alcoholics anonymous (AA): www.aa.org      Physical Activity: www.romelia.nih.gov/vq9mcvs      Tobacco use: www.quitwithusla.org

## 2023-12-14 NOTE — PROGRESS NOTES
"  Sean Main presented for a  Medicare AWV and comprehensive Health Risk Assessment today. The following components were reviewed and updated:    Medical history  Family History  Social history  Allergies and Current Medications  Health Risk Assessment  Health Maintenance  Care Team     ** See Completed Assessments for Annual Wellness Visit within the encounter summary.**    The following assessments were completed:  Living Situation  CAGE  Depression Screening  Timed Get Up and Go  Whisper Test  Cognitive Function Screening    Nutrition Screening  ADL Screening  PAQ Screening    Vitals:    12/14/23 1100   BP: 116/62   BP Location: Left arm   Patient Position: Sitting   BP Method: Medium (Manual)   Pulse: (!) 44   SpO2: 98%   Weight: 71.4 kg (157 lb 6.5 oz)   Height: 5' 9" (1.753 m)     Body mass index is 23.25 kg/m².  Physical Exam  Vitals reviewed.   Constitutional:       General: He is not in acute distress.  HENT:      Right Ear: There is impacted cerumen.      Left Ear: There is impacted cerumen.   Pulmonary:      Effort: Pulmonary effort is normal. No respiratory distress.   Neurological:      Mental Status: He is alert.   Psychiatric:         Mood and Affect: Mood normal.         Behavior: Behavior normal.         Thought Content: Thought content normal.         Judgment: Judgment normal.         Diagnoses and health risks identified today and associated recommendations/orders:    1. Encounter for preventive health examination  Reviewed and discussed health maintenance.    - Ambulatory referral/consult to Audiology; Future    2. Senile purpura  Stable- continue current treatment and follow up routinely with PCP     3. Atherosclerosis of aorta  Stable- continue current treatment and follow up routinely with PCP   HTN and HLD controlled    4. Essential hypertension  Stable- continue current treatment and follow up routinely with PCP     5. Dyslipidemia  Stable- continue current treatment and follow up " routinely with PCP     6. Coronary artery disease involving native coronary artery of native heart without angina pectoris  Stable- continue current treatment and follow up routinely with PCP     7. Decreased hearing, unspecified laterality  - Ambulatory referral/consult to Audiology; Future  - Ambulatory referral/consult to ENT; Future    8. Impacted cerumen, unspecified laterality  - Ambulatory referral/consult to ENT; Future    Provided Sean with a 5-10 year written screening schedule and personal prevention plan. Recommendations were developed using the USPSTF age appropriate recommendations. Education, counseling, and referrals were provided as needed. After Visit Summary printed and given to patient which includes a list of additional screenings\tests needed.    I offered to discuss end of life issues, including information on how to make advance directives that the patient could use to name someone who would make medical decisions on their behalf if they became too ill to make themselves.  _X_Patient is interested, I provided paper work and offered to discuss.  Fatou Dhaliwal, NP

## 2023-12-31 ENCOUNTER — EXTERNAL CHRONIC CARE MANAGEMENT (OUTPATIENT)
Dept: PRIMARY CARE CLINIC | Facility: CLINIC | Age: 83
End: 2023-12-31
Payer: MEDICARE

## 2023-12-31 PROCEDURE — 99439 CHRNC CARE MGMT STAF EA ADDL: CPT | Mod: PBBFAC,PO | Performed by: FAMILY MEDICINE

## 2023-12-31 PROCEDURE — 99490 CHRNC CARE MGMT STAFF 1ST 20: CPT | Mod: S$PBB,,, | Performed by: FAMILY MEDICINE

## 2023-12-31 PROCEDURE — 99439 CHRNC CARE MGMT STAF EA ADDL: CPT | Mod: S$PBB,,, | Performed by: FAMILY MEDICINE

## 2023-12-31 PROCEDURE — 99490 CHRNC CARE MGMT STAFF 1ST 20: CPT | Mod: PBBFAC,PO | Performed by: FAMILY MEDICINE

## 2024-01-31 ENCOUNTER — EXTERNAL CHRONIC CARE MANAGEMENT (OUTPATIENT)
Dept: PRIMARY CARE CLINIC | Facility: CLINIC | Age: 84
End: 2024-01-31
Payer: MEDICARE

## 2024-01-31 PROCEDURE — 99490 CHRNC CARE MGMT STAFF 1ST 20: CPT | Mod: S$PBB,,, | Performed by: FAMILY MEDICINE

## 2024-01-31 PROCEDURE — 99490 CHRNC CARE MGMT STAFF 1ST 20: CPT | Mod: PBBFAC,PO | Performed by: FAMILY MEDICINE

## 2024-02-29 ENCOUNTER — EXTERNAL CHRONIC CARE MANAGEMENT (OUTPATIENT)
Dept: PRIMARY CARE CLINIC | Facility: CLINIC | Age: 84
End: 2024-02-29
Payer: MEDICARE

## 2024-02-29 PROCEDURE — 99490 CHRNC CARE MGMT STAFF 1ST 20: CPT | Mod: PBBFAC,PO | Performed by: FAMILY MEDICINE

## 2024-02-29 PROCEDURE — 99490 CHRNC CARE MGMT STAFF 1ST 20: CPT | Mod: S$PBB,,, | Performed by: FAMILY MEDICINE

## 2024-03-06 DIAGNOSIS — E78.5 HYPERLIPIDEMIA, UNSPECIFIED HYPERLIPIDEMIA TYPE: ICD-10-CM

## 2024-03-06 RX ORDER — ATORVASTATIN CALCIUM 10 MG/1
TABLET, FILM COATED ORAL
Qty: 45 TABLET | Refills: 2 | Status: SHIPPED | OUTPATIENT
Start: 2024-03-06

## 2024-03-06 NOTE — TELEPHONE ENCOUNTER
No care due was identified.  [facility] Embedded Care Due Messages. Reference number: [id number].   3/06/2024 8:18:01 AM CST

## 2024-03-07 RX ORDER — CARVEDILOL 3.12 MG/1
TABLET ORAL
Qty: 90 TABLET | Refills: 2 | Status: SHIPPED | OUTPATIENT
Start: 2024-03-07

## 2024-03-07 NOTE — TELEPHONE ENCOUNTER
Refill Routing Note   Medication(s) are not appropriate for processing by [facility] for the following reason(s):        Required vitals abnormal    [facility] action(s):  Defer  Approve             Alert overridden per protocol: Yes     Appointments  past 12m or future 3m with PCP    Date Provider   Last Visit   10/13/2023 Stevenson Garland MD   Next Visit   Visit date not found Stevenson Garland MD   ED visits in past 90 days: 0        Note composed:8:45 PM 03/06/2024

## 2024-03-25 ENCOUNTER — CLINICAL SUPPORT (OUTPATIENT)
Dept: AUDIOLOGY | Facility: CLINIC | Age: 84
End: 2024-03-25
Payer: MEDICARE

## 2024-03-25 ENCOUNTER — OFFICE VISIT (OUTPATIENT)
Dept: OTOLARYNGOLOGY | Facility: CLINIC | Age: 84
End: 2024-03-25
Payer: MEDICARE

## 2024-03-25 VITALS — HEIGHT: 69 IN | WEIGHT: 156.94 LBS | BODY MASS INDEX: 23.25 KG/M2

## 2024-03-25 DIAGNOSIS — H93.13 BILATERAL TINNITUS: ICD-10-CM

## 2024-03-25 DIAGNOSIS — H90.3 BILATERAL HIGH FREQUENCY SENSORINEURAL HEARING LOSS: ICD-10-CM

## 2024-03-25 DIAGNOSIS — H91.90 DECREASED HEARING, UNSPECIFIED LATERALITY: ICD-10-CM

## 2024-03-25 DIAGNOSIS — H93.293 IMPAIRMENT OF AUDITORY DISCRIMINATION OF BOTH EARS: ICD-10-CM

## 2024-03-25 DIAGNOSIS — H61.23 BILATERAL IMPACTED CERUMEN: ICD-10-CM

## 2024-03-25 DIAGNOSIS — H90.3 BILATERAL SENSORINEURAL HEARING LOSS: ICD-10-CM

## 2024-03-25 DIAGNOSIS — Z00.00 ENCOUNTER FOR PREVENTIVE HEALTH EXAMINATION: Primary | ICD-10-CM

## 2024-03-25 PROCEDURE — 99999 PR PBB SHADOW E&M-EST. PATIENT-LVL III: CPT | Mod: PBBFAC,,, | Performed by: NURSE PRACTITIONER

## 2024-03-25 PROCEDURE — 99211 OFF/OP EST MAY X REQ PHY/QHP: CPT | Mod: PBBFAC,27,PO | Performed by: AUDIOLOGIST

## 2024-03-25 PROCEDURE — 92557 COMPREHENSIVE HEARING TEST: CPT | Mod: PBBFAC,PO | Performed by: AUDIOLOGIST

## 2024-03-25 PROCEDURE — 92567 TYMPANOMETRY: CPT | Mod: PBBFAC,PO | Performed by: AUDIOLOGIST

## 2024-03-25 PROCEDURE — 99213 OFFICE O/P EST LOW 20 MIN: CPT | Mod: 25,PBBFAC,PO | Performed by: NURSE PRACTITIONER

## 2024-03-25 PROCEDURE — 99999 PR PBB SHADOW E&M-EST. PATIENT-LVL I: CPT | Mod: PBBFAC,,, | Performed by: AUDIOLOGIST

## 2024-03-25 PROCEDURE — G0268 REMOVAL OF IMPACTED WAX MD: HCPCS | Mod: PBBFAC,PO | Performed by: NURSE PRACTITIONER

## 2024-03-25 PROCEDURE — G0268 REMOVAL OF IMPACTED WAX MD: HCPCS | Mod: S$PBB,,, | Performed by: NURSE PRACTITIONER

## 2024-03-25 PROCEDURE — 99213 OFFICE O/P EST LOW 20 MIN: CPT | Mod: 25,S$PBB,, | Performed by: NURSE PRACTITIONER

## 2024-03-25 NOTE — PROGRESS NOTES
Subjective     Patient ID: Sean Main is a 83 y.o. male.    Chief Complaint: Hearing Loss    HPI  Patient is new to ENT, referred by POPPY plata for consultation for hearing loss.  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  Patient lives with his son who tells him the TV volume is excessively loud.  Patient must often ask for repetition.  Patient denies family history of hearing loss.    Review of Systems   Constitutional: Negative.    HENT:  Positive for hearing loss.    Eyes: Negative.    Respiratory: Negative.     Cardiovascular: Negative.    Gastrointestinal: Negative.    Musculoskeletal: Negative.    Integumentary:  Negative.   Neurological: Negative.    Hematological: Negative.    Psychiatric/Behavioral: Negative.            Objective     Physical Exam  Vitals and nursing note reviewed.   Constitutional:       General: He is not in acute distress.     Appearance: He is well-developed. He is not ill-appearing.   HENT:      Head: Normocephalic and atraumatic.      Right Ear: Hearing, tympanic membrane, ear canal and external ear normal. No middle ear effusion. Tympanic membrane is not erythematous.      Left Ear: Hearing, tympanic membrane, ear canal and external ear normal.  No middle ear effusion. Tympanic membrane is not erythematous.      Nose: Nose normal.   Eyes:      General: Lids are normal. No scleral icterus.        Right eye: No discharge.         Left eye: No discharge.   Neck:      Trachea: Trachea normal. No tracheal deviation.   Cardiovascular:      Rate and Rhythm: Normal rate.   Pulmonary:      Effort: Pulmonary effort is normal. No respiratory distress.      Breath sounds: No stridor. No wheezing.   Musculoskeletal:         General: Normal range of motion.      Cervical back: Normal range of motion.   Skin:     General: Skin is warm and dry.   Neurological:      Mental Status: He is alert and oriented to person, place, and time.      Coordination: Coordination is  intact.      Gait: Gait normal.   Psychiatric:         Attention and Perception: Attention normal.         Mood and Affect: Mood normal.         Speech: Speech normal.         Behavior: Behavior normal. Behavior is cooperative.     SEPARATE PROCEDURE IN OFFICE:   Procedure: Removal of impacted cerumen, bilateral   Pre Procedure Diagnosis: Cerumen Impaction   Post Procedure Diagnosis: Cerumen Impaction   Verbal informed consent in regards to risk of trauma to ear canal, ear drum or hearing, discomfort during procedure and/or inability to remove cerumen impaction in one session or unforeseen events or complications.   No anesthesia.     Procedure in detail:   Ear canal visualized bilateral with appropriate size ear speculum utilizing Operating Head Binocular Otomicroscope   Utilizing the following:  Ring curet, delicate alligator forceps, and/or suction cannula was used. The impacted cerumen of the ear canals was removed atraumatically. The TM and EAC were then inspected and found to be clear of wax. See description of TMs/EACs in PE above.   Complications: No   Condition: Improved/Good         Assessment and Plan     1. Bilateral high frequency sensorineural hearing loss  -     Ambulatory referral/consult to ENT    2. Bilateral impacted cerumen  -     Ambulatory referral/consult to ENT      PATIENT IS MEDICALLY CLEARED FOR HEARING AIDS. The patient's hearing loss is not due to a temporary, correctable physical condition. There are no contraindications to hearing aid candidacy. Patient's audiogram reveals the patient is a candidate for amplification. Audiogram is reviewed in detail with the patient. The audiologist's recommendation that the patient have amplification/hearing aids is discussed and questions answered. Patient has been given information by the audiologist on how to schedule a hearing aid consultation. Patient is encouraged to wear ear protection in loud noise and return annually for hearing test. Return  to clinic as needed for further ENT concerns.           No follow-ups on file.

## 2024-03-25 NOTE — PROGRESS NOTES
Sean Main was seen 03/25/2024 for an audiological evaluation.      Pt reported difficulty hearing speech clearly.  He has a past Hx of bilateral SNHL and intermittent tinnitus bilaterally.     Otoscopy revealed clear view of both external ear canals and tympanic membranes.  No obstructive cerumen present in either ear canal.       Audiogram results revealed a mild-to-severe sensorineural hearing loss for the right ear and a mild-to-severe sensorineural hearing loss for the left ear.  Speech Reception Thresholds were 15 dBHL for the right ear and 15 dBHL for the left ear.  Word recognition scores were good for the right ear and fair for the left ear.   Tympanograms were Type A for the right ear and Type A for the left ear.    Audiogram results were reviewed in detail with patient and all questions were answered. Results will be reviewed by ENT at the completion of this note.      Recommend binaural amplification pending medical clearance, hearing protection for all loud sounds and annual audiogram to monitor hearing loss.

## 2024-03-26 ENCOUNTER — TELEPHONE (OUTPATIENT)
Dept: AUDIOLOGY | Facility: CLINIC | Age: 84
End: 2024-03-26
Payer: MEDICARE

## 2024-03-26 NOTE — TELEPHONE ENCOUNTER
MITCHOV asking the patient to call back so I can get him scheduled for a consultation with Joselyn.

## 2024-03-26 NOTE — TELEPHONE ENCOUNTER
----- Message from Maria Del Rosario Bob LPN sent at 3/26/2024 11:10 AM CDT -----  Regarding: FW: advice  Contact: patient  Pt would like a call to discuss getting a hearing aid    ----- Message -----  From: Rosanna George  Sent: 3/26/2024  10:32 AM CDT  To: Gail RAMOS Staff  Subject: advice                                           Type: Needs Medical Advice  Who Called:  patient  Symptoms (please be specific):    How long has patient had these symptoms:    Pharmacy name and phone #:    Best Call Back Number: [phone] (home)     Additional Information: Do we have someone to make hearing aids?  Please call patient to advise.  Thanks!

## 2024-05-31 ENCOUNTER — EXTERNAL CHRONIC CARE MANAGEMENT (OUTPATIENT)
Dept: PRIMARY CARE CLINIC | Facility: CLINIC | Age: 84
End: 2024-05-31
Payer: MEDICARE

## 2024-05-31 PROCEDURE — 99490 CHRNC CARE MGMT STAFF 1ST 20: CPT | Mod: PBBFAC,PO | Performed by: FAMILY MEDICINE

## 2024-05-31 PROCEDURE — 99490 CHRNC CARE MGMT STAFF 1ST 20: CPT | Mod: S$PBB,,, | Performed by: FAMILY MEDICINE

## 2024-06-30 ENCOUNTER — EXTERNAL CHRONIC CARE MANAGEMENT (OUTPATIENT)
Dept: PRIMARY CARE CLINIC | Facility: CLINIC | Age: 84
End: 2024-06-30
Payer: MEDICARE

## 2024-06-30 PROCEDURE — 99439 CHRNC CARE MGMT STAF EA ADDL: CPT | Mod: S$PBB,,, | Performed by: FAMILY MEDICINE

## 2024-06-30 PROCEDURE — 99490 CHRNC CARE MGMT STAFF 1ST 20: CPT | Mod: S$PBB,,, | Performed by: FAMILY MEDICINE

## 2024-06-30 PROCEDURE — 99490 CHRNC CARE MGMT STAFF 1ST 20: CPT | Mod: PBBFAC,PO | Performed by: FAMILY MEDICINE

## 2024-06-30 PROCEDURE — 99439 CHRNC CARE MGMT STAF EA ADDL: CPT | Mod: PBBFAC,PO | Performed by: FAMILY MEDICINE

## 2024-07-31 ENCOUNTER — EXTERNAL CHRONIC CARE MANAGEMENT (OUTPATIENT)
Dept: PRIMARY CARE CLINIC | Facility: CLINIC | Age: 84
End: 2024-07-31
Payer: MEDICARE

## 2024-07-31 PROCEDURE — 99439 CHRNC CARE MGMT STAF EA ADDL: CPT | Mod: PBBFAC,PO | Performed by: FAMILY MEDICINE

## 2024-07-31 PROCEDURE — 99490 CHRNC CARE MGMT STAFF 1ST 20: CPT | Mod: PBBFAC,PO | Performed by: FAMILY MEDICINE

## 2024-07-31 PROCEDURE — 99439 CHRNC CARE MGMT STAF EA ADDL: CPT | Mod: S$PBB,,, | Performed by: FAMILY MEDICINE

## 2024-07-31 PROCEDURE — 99490 CHRNC CARE MGMT STAFF 1ST 20: CPT | Mod: S$PBB,,, | Performed by: FAMILY MEDICINE

## 2024-08-31 ENCOUNTER — EXTERNAL CHRONIC CARE MANAGEMENT (OUTPATIENT)
Dept: PRIMARY CARE CLINIC | Facility: CLINIC | Age: 84
End: 2024-08-31
Payer: MEDICARE

## 2024-08-31 PROCEDURE — 99490 CHRNC CARE MGMT STAFF 1ST 20: CPT | Mod: S$PBB,,, | Performed by: FAMILY MEDICINE

## 2024-08-31 PROCEDURE — 99490 CHRNC CARE MGMT STAFF 1ST 20: CPT | Mod: PBBFAC,PO | Performed by: FAMILY MEDICINE

## 2024-09-08 DIAGNOSIS — E78.5 DYSLIPIDEMIA: ICD-10-CM

## 2024-09-08 NOTE — TELEPHONE ENCOUNTER
Care Due:                  Date            Visit Type   Department     Provider  --------------------------------------------------------------------------------                                EP [facility]  Last Visit: 10-      CARE ([facility])   TORIE RYAN                              EP -                              PRIMARY      [facility]  Next Visit: 10-      CARE ([facility])   TORIE RYAN                                                            Last  Test          Frequency    Reason                     Performed    Due Date  --------------------------------------------------------------------------------    CMP.........  12 months..  atorvastatin, ezetimibe..  10-   10-    Lipid Panel.  12 months..  atorvastatin, ezetimibe..  10-   10-    [facility] Embedded Care Due Messages. Reference number: [id number].   9/08/2024 3:41:26 PM CDT

## 2024-09-09 RX ORDER — AMLODIPINE BESYLATE 5 MG/1
TABLET ORAL
Qty: 90 TABLET | Refills: 0 | Status: SHIPPED | OUTPATIENT
Start: 2024-09-09

## 2024-09-09 RX ORDER — EZETIMIBE 10 MG/1
TABLET ORAL
Qty: 90 TABLET | Refills: 0 | Status: SHIPPED | OUTPATIENT
Start: 2024-09-09

## 2024-09-09 NOTE — TELEPHONE ENCOUNTER
Provider Staff:  Action required for this patient     Please see care gap opportunities below in Care Due Message.    Thanks!  [facility]     Appointments      Date Provider   Last Visit   10/13/2023 Stevenson Garland MD   Next Visit   10/16/2024 Stevenson Garland MD      Refill Decision Note   Sean Main  is requesting a refill authorization.  Brief Assessment and Rationale for Refill:  Approve     Medication Therapy Plan:         Comments:     Note composed:2:27 AM 09/09/2024

## 2024-09-30 ENCOUNTER — EXTERNAL CHRONIC CARE MANAGEMENT (OUTPATIENT)
Dept: PRIMARY CARE CLINIC | Facility: CLINIC | Age: 84
End: 2024-09-30
Payer: MEDICARE

## 2024-09-30 PROCEDURE — 99490 CHRNC CARE MGMT STAFF 1ST 20: CPT | Mod: S$PBB,,, | Performed by: FAMILY MEDICINE

## 2024-09-30 PROCEDURE — 99490 CHRNC CARE MGMT STAFF 1ST 20: CPT | Mod: PBBFAC,PO | Performed by: FAMILY MEDICINE

## 2024-10-31 ENCOUNTER — EXTERNAL CHRONIC CARE MANAGEMENT (OUTPATIENT)
Dept: PRIMARY CARE CLINIC | Facility: CLINIC | Age: 84
End: 2024-10-31
Payer: COMMERCIAL

## 2024-10-31 PROCEDURE — 99490 CHRNC CARE MGMT STAFF 1ST 20: CPT | Mod: S$GLB,,, | Performed by: FAMILY MEDICINE

## 2024-11-30 ENCOUNTER — EXTERNAL CHRONIC CARE MANAGEMENT (OUTPATIENT)
Dept: PRIMARY CARE CLINIC | Facility: CLINIC | Age: 84
End: 2024-11-30
Payer: COMMERCIAL

## 2024-11-30 DIAGNOSIS — E78.5 HYPERLIPIDEMIA, UNSPECIFIED HYPERLIPIDEMIA TYPE: ICD-10-CM

## 2024-11-30 PROCEDURE — 99439 CHRNC CARE MGMT STAF EA ADDL: CPT | Mod: S$GLB,,, | Performed by: FAMILY MEDICINE

## 2024-11-30 PROCEDURE — 99490 CHRNC CARE MGMT STAFF 1ST 20: CPT | Mod: S$GLB,,, | Performed by: FAMILY MEDICINE

## 2024-11-30 NOTE — TELEPHONE ENCOUNTER
Care Due:                  Date            Visit Type   Department     Provider  --------------------------------------------------------------------------------                                EP -                              PRIMARY      [facility] FAM[facility]  Last Visit: 10-      CARE ([facility])   MEDICINE       JESSIE RYAN  Next Visit: None Scheduled  None         None Found                                                            Last  Test          Frequency    Reason                     Performed    Due Date  --------------------------------------------------------------------------------    Office Visit  15 months..  atorvastatin, ezetimibe..  10-   01-    CMP.........  12 months..  atorvastatin, ezetimibe..  10-   10-    Lipid Panel.  12 months..  atorvastatin, ezetimibe..  10-   10-    [facility] Embedded Care Due Messages. Reference number: [id number].   11/30/2024 9:38:08 AM CST

## 2024-12-01 RX ORDER — CARVEDILOL 3.12 MG/1
TABLET ORAL
Qty: 90 TABLET | Refills: 3 | Status: SHIPPED | OUTPATIENT
Start: 2024-12-01

## 2024-12-01 RX ORDER — ATORVASTATIN CALCIUM 10 MG/1
TABLET, FILM COATED ORAL
Qty: 45 TABLET | Refills: 3 | Status: SHIPPED | OUTPATIENT
Start: 2024-12-01

## 2024-12-01 NOTE — TELEPHONE ENCOUNTER
Refill Routing Note   Medication(s) are not appropriate for processing by [facility] for the following reason(s):        Required labs outdated  Required vitals outdated    [facility] action(s):  Defer   Requires appointment : Yes     Requires labs : Yes             Appointments  past 12m or future 3m with PCP    Date Provider   Last Visit   10/13/2023 Stevenson Garland MD   Next Visit   Visit date not found Stevenson Garland MD   ED visits in past 90 days: 0        Note composed:8:31 PM 11/30/2024

## 2024-12-31 ENCOUNTER — EXTERNAL CHRONIC CARE MANAGEMENT (OUTPATIENT)
Dept: PRIMARY CARE CLINIC | Facility: CLINIC | Age: 84
End: 2024-12-31
Payer: COMMERCIAL

## 2024-12-31 PROCEDURE — 99490 CHRNC CARE MGMT STAFF 1ST 20: CPT | Mod: S$GLB,,, | Performed by: FAMILY MEDICINE

## 2025-01-27 ENCOUNTER — TELEPHONE (OUTPATIENT)
Dept: FAMILY MEDICINE | Facility: CLINIC | Age: 85
End: 2025-01-27
Payer: COMMERCIAL

## 2025-01-27 NOTE — TELEPHONE ENCOUNTER
----- Message from Diane sent at 1/27/2025  3:21 PM CST -----  Contact: Pt  Pt is calling to be worked in to see the Dr for having elevated BP on 1/16/2025  - 178//179/1280/ 70's and what prompted him to take it was that he wasn't feeling well per pt / pt took the BP again on the 17th and the numbers were 128/64 and 125/64 / pt is wanting to discuss and see if it may be his machine or is he taking it wrong and can be reached at [phone]//thanks/dbw     Pt states that his numbers having been in range since the 17th

## 2025-01-27 NOTE — TELEPHONE ENCOUNTER
Call placed to patient regarding concerns of high blood pressure, scheduled patient an appointment with Iris Bowling NP on 01/29

## 2025-01-29 ENCOUNTER — OFFICE VISIT (OUTPATIENT)
Dept: FAMILY MEDICINE | Facility: CLINIC | Age: 85
End: 2025-01-29
Payer: MEDICARE

## 2025-01-29 VITALS
HEART RATE: 52 BPM | HEIGHT: 69 IN | TEMPERATURE: 98 F | BODY MASS INDEX: 23.64 KG/M2 | WEIGHT: 159.63 LBS | OXYGEN SATURATION: 99 % | SYSTOLIC BLOOD PRESSURE: 120 MMHG | DIASTOLIC BLOOD PRESSURE: 60 MMHG

## 2025-01-29 DIAGNOSIS — I10 ESSENTIAL HYPERTENSION: Primary | ICD-10-CM

## 2025-01-29 PROCEDURE — 99999 PR PBB SHADOW E&M-EST. PATIENT-LVL III: CPT | Mod: PBBFAC,,, | Performed by: NURSE PRACTITIONER

## 2025-01-29 PROCEDURE — 99213 OFFICE O/P EST LOW 20 MIN: CPT | Mod: PBBFAC,PO | Performed by: NURSE PRACTITIONER

## 2025-01-29 PROCEDURE — 99213 OFFICE O/P EST LOW 20 MIN: CPT | Mod: S$PBB,,, | Performed by: NURSE PRACTITIONER

## 2025-01-29 RX ORDER — MULTIVITAMIN WITH IRON
1 TABLET ORAL DAILY
COMMUNITY

## 2025-01-29 NOTE — PROGRESS NOTES
Subjective:       Patient ID: Sean Main is a 84 y.o. male.    Chief Complaint: Hypertension (Patient states on 01/16/2025 his blood pressure was 174/71 and 180/72 )    HPI    Had elevated BP readings of 174/71 and 180/72 on 1/16/24. Denies feeling bad and has been getting normal readings of 120s/70s-80s ever since. Has not had any more elevated readings. Pt thinks he may have had an erroneous reading.    Past Medical History:   Diagnosis Date    CAD (coronary artery disease)     Compression fracture of T12 vertebra     DDD (degenerative disc disease), lumbar     Diverticulosis     GERD (gastroesophageal reflux disease)     Hemorrhoid     Hernia of unspecified site of abdominal cavity without mention of obstruction or gangrene     hiatal hernia    HLD (hyperlipidemia)     HTN (hypertension)        Past Surgical History:   Procedure Laterality Date    BASAL CELL CARCINOMA EXCISION  08/01/2016    left nasal    CHOLECYSTECTOMY      COLONOSCOPY  2008    diverticulosis    CORONARY ARTERY BYPASS GRAFT  4/4/2012    6 vessels    LIVER BIOPSY      UPPER GASTROINTESTINAL ENDOSCOPY  2011    hiatal hernia       Review of patient's allergies indicates:   Allergen Reactions    Lipitor [atorvastatin]      Myalgia      Pravastatin      myalgias       Social History     Socioeconomic History    Marital status:    Tobacco Use    Smoking status: Never    Smokeless tobacco: Never   Substance and Sexual Activity    Alcohol use: No    Drug use: No     Social Drivers of Health     Financial Resource Strain: Low Risk  (12/14/2023)    Overall Financial Resource Strain (CARDIA)     Difficulty of Paying Living Expenses: Not hard at all   Food Insecurity: No Food Insecurity (12/14/2023)    Hunger Vital Sign     Worried About Running Out of Food in the Last Year: Never true     Ran Out of Food in the Last Year: Never true   Transportation Needs: No Transportation Needs (12/14/2023)    PRAPARE - Transportation     Lack of  Transportation (Medical): No     Lack of Transportation (Non-Medical): No   Physical Activity: Insufficiently Active (12/14/2023)    Exercise Vital Sign     Days of Exercise per Week: 3 days     Minutes of Exercise per Session: 40 min   Stress: No Stress Concern Present (12/14/2023)    [employer] - Occupational Stress Questionnaire     Feeling of Stress : Not at all   Housing Stability: Unknown (12/14/2023)    Housing Stability Vital Sign     Unable to Pay for Housing in the Last Year: No     Number of Places Lived in the Last Year: 1       Current Outpatient Medications on File Prior to Visit   Medication Sig Dispense Refill    amLODIPine (NORVASC) 5 MG tablet TAKE 1 TABLET BY MOUTH ONCE DAILY IN THE EVENING 90 tablet 0    aspirin (ECOTRIN) 81 MG EC tablet Take 81 mg by mouth once daily.      atorvastatin (LIPITOR) 10 MG tablet Take 1/2 (one-half) tablet by mouth once daily 45 tablet 3    carvediloL (COREG) 3.125 MG tablet TAKE 1/2 (ONE-HALF) TABLET BY MOUTH TWICE DAILY WITH FOOD 90 tablet 3    ezetimibe (ZETIA) 10 mg tablet Take 1 tablet by mouth once daily 90 tablet 0    multivitamin,tx-iron-minerals Tab Take 1 tablet by mouth once daily.      pantoprazole (PROTONIX) 40 MG tablet Take 1 tablet by mouth once daily (Patient taking differently: Take 40 mg by mouth every other day. Do not crush, break, chew) 90 tablet 1    saw palmetto 500 MG capsule Take 500 mg by mouth 2 (two) times daily.      omega-3 fatty acids/fish oil (FISH OIL-OMEGA-3 FATTY ACIDS) 300-1,000 mg capsule Take 1 capsule by mouth once daily.      [DISCONTINUED] fluticasone propionate (FLONASE) 50 mcg/actuation nasal spray Use 1 spray(s) in each nostril once daily (Patient not taking: Reported on 10/13/2023) 16 g 11    [DISCONTINUED] niacin 50 MG Tab Take 100 mg by mouth every evening.       No current facility-administered medications on file prior to visit.       Family History   Problem Relation Name Age of Onset     "Cirrhosis Neg Hx      Liver cancer Neg Hx      Liver disease Neg Hx      Colon cancer Neg Hx         Review of Systems   Constitutional: Negative.    HENT: Negative.     Eyes: Negative.    Respiratory: Negative.     Cardiovascular: Negative.    Gastrointestinal: Negative.    Genitourinary: Negative.    Musculoskeletal: Negative.    Skin: Negative.    Neurological: Negative.    Psychiatric/Behavioral: Negative.         Objective:      /60 (Patient Position: Sitting)   Pulse (!) 52   Temp 98 °F (36.7 °C) (Oral)   Ht 5' 9" (1.753 m)   Wt 72.4 kg (159 lb 9.8 oz)   SpO2 99%   BMI 23.57 kg/m²   Physical Exam  Vitals and nursing note reviewed.   Constitutional:       General: He is not in acute distress.     Appearance: Normal appearance. He is well-groomed.   HENT:      Head: Normocephalic.      Right Ear: Tympanic membrane, ear canal and external ear normal.      Left Ear: Tympanic membrane, ear canal and external ear normal.      Nose: Nose normal.      Mouth/Throat:      Lips: Pink.      Mouth: Mucous membranes are moist.      Pharynx: Oropharynx is clear. No oropharyngeal exudate or posterior oropharyngeal erythema.   Eyes:      Extraocular Movements: Extraocular movements intact.      Conjunctiva/sclera: Conjunctivae normal.      Pupils: Pupils are equal, round, and reactive to light.   Cardiovascular:      Rate and Rhythm: Regular rhythm. Bradycardia present.      Heart sounds: Normal heart sounds. No murmur heard.  Pulmonary:      Effort: Pulmonary effort is normal.      Breath sounds: Normal breath sounds.   Chest:      Chest wall: No tenderness.   Abdominal:      General: Bowel sounds are normal.      Palpations: Abdomen is soft.      Tenderness: There is no abdominal tenderness.   Musculoskeletal:         General: No swelling or tenderness. Normal range of motion.      Cervical back: Normal range of motion and neck supple.      Right lower leg: No edema.      Left lower leg: No edema. "   Lymphadenopathy:      Cervical: No cervical adenopathy.   Skin:     General: Skin is warm and dry.   Neurological:      General: No focal deficit present.      Mental Status: He is alert and oriented to person, place, and time. Mental status is at baseline.      Gait: Gait is intact.   Psychiatric:         Mood and Affect: Mood normal.         Behavior: Behavior normal.         Assessment:       1. Essential hypertension        Plan:       Essential hypertension        Checked BP with home monitor and result 152/76 then checked a manual BP and result was 120/60. Pt reports is BP monitor is old and will buy a new monitor. Pt educated on the proper way to check a BP. Declined digital HTN referral for now.     Follow up with PCP as scheduled.

## 2025-01-31 ENCOUNTER — EXTERNAL CHRONIC CARE MANAGEMENT (OUTPATIENT)
Dept: PRIMARY CARE CLINIC | Facility: CLINIC | Age: 85
End: 2025-01-31
Payer: MEDICARE

## 2025-01-31 PROCEDURE — 99439 CHRNC CARE MGMT STAF EA ADDL: CPT | Mod: S$PBB,,, | Performed by: FAMILY MEDICINE

## 2025-01-31 PROCEDURE — 99490 CHRNC CARE MGMT STAFF 1ST 20: CPT | Mod: S$PBB,,, | Performed by: FAMILY MEDICINE

## 2025-02-24 DIAGNOSIS — Z00.00 ENCOUNTER FOR MEDICARE ANNUAL WELLNESS EXAM: ICD-10-CM

## 2025-02-28 ENCOUNTER — EXTERNAL CHRONIC CARE MANAGEMENT (OUTPATIENT)
Dept: PRIMARY CARE CLINIC | Facility: CLINIC | Age: 85
End: 2025-02-28
Payer: MEDICARE

## 2025-02-28 PROCEDURE — 99490 CHRNC CARE MGMT STAFF 1ST 20: CPT | Mod: PBBFAC,PO | Performed by: FAMILY MEDICINE

## 2025-03-02 DIAGNOSIS — E78.5 DYSLIPIDEMIA: ICD-10-CM

## 2025-03-02 NOTE — TELEPHONE ENCOUNTER
Care Due:                  Date            Visit Type   Department     Provider  --------------------------------------------------------------------------------                                EP [facility]  Last Visit: 10-      CARE ([facility])   TORIE RYAN                              EP -                              PRIMARY      [facility]  Next Visit: 10-      CARE ([facility])   TORIE RYAN                                                            Last  Test          Frequency    Reason                     Performed    Due Date  --------------------------------------------------------------------------------    Office Visit  15 months..  atorvastatin, ezetimibe..  10-   01-    CMP.........  12 months..  atorvastatin, ezetimibe..  10-   10-    Lipid Panel.  12 months..  atorvastatin, ezetimibe..  10-   10-    [facility] Embedded Care Due Messages. Reference number: [id number].   3/02/2025 12:44:40 PM CST

## 2025-03-03 RX ORDER — EZETIMIBE 10 MG/1
10 TABLET ORAL DAILY
Qty: 90 TABLET | Refills: 3 | Status: SHIPPED | OUTPATIENT
Start: 2025-03-03

## 2025-03-12 RX ORDER — AMLODIPINE BESYLATE 5 MG/1
5 TABLET ORAL NIGHTLY
Qty: 90 TABLET | Refills: 3 | Status: SHIPPED | OUTPATIENT
Start: 2025-03-12

## 2025-03-12 NOTE — TELEPHONE ENCOUNTER
No care due was identified.  [facility] Embedded Care Due Messages. Reference number: [id number].   3/12/2025 12:03:19 PM CDT

## 2025-03-12 NOTE — TELEPHONE ENCOUNTER
Requested Prescriptions     Pending Prescriptions Disp Refills    amLODIPine (NORVASC) 5 MG tablet [Pharmacy Med Name: amLODIPine Besylate 5 MG Oral Tablet] 90 tablet 0     Sig: Take 1 tablet by mouth in the evening   LOV:10/13/2023  NOV:10/2/2025

## 2025-03-27 ENCOUNTER — TELEPHONE (OUTPATIENT)
Dept: FAMILY MEDICINE | Facility: CLINIC | Age: 85
End: 2025-03-27
Payer: MEDICARE

## 2025-03-28 ENCOUNTER — HOSPITAL ENCOUNTER (OUTPATIENT)
Dept: RADIOLOGY | Facility: HOSPITAL | Age: 85
Discharge: HOME | End: 2025-03-28
Attending: FAMILY MEDICINE
Payer: MEDICARE

## 2025-03-28 ENCOUNTER — OFFICE VISIT (OUTPATIENT)
Dept: FAMILY MEDICINE | Facility: CLINIC | Age: 85
End: 2025-03-28
Payer: MEDICARE

## 2025-03-28 VITALS
HEIGHT: 69 IN | BODY MASS INDEX: 22.92 KG/M2 | SYSTOLIC BLOOD PRESSURE: 128 MMHG | HEART RATE: 43 BPM | WEIGHT: 154.75 LBS | DIASTOLIC BLOOD PRESSURE: 72 MMHG | OXYGEN SATURATION: 99 %

## 2025-03-28 DIAGNOSIS — I70.0 ATHEROSCLEROSIS OF AORTA: ICD-10-CM

## 2025-03-28 DIAGNOSIS — M54.2 POSTERIOR NECK PAIN: ICD-10-CM

## 2025-03-28 DIAGNOSIS — I10 ESSENTIAL HYPERTENSION: ICD-10-CM

## 2025-03-28 DIAGNOSIS — M54.2 POSTERIOR NECK PAIN: Primary | ICD-10-CM

## 2025-03-28 PROBLEM — D69.2 SENILE PURPURA: Status: RESOLVED | Noted: 2023-12-14 | Resolved: 2025-03-28

## 2025-03-28 PROCEDURE — 72050 X-RAY EXAM NECK SPINE 4/5VWS: CPT | Mod: 26,,, | Performed by: RADIOLOGY

## 2025-03-28 PROCEDURE — 99213 OFFICE O/P EST LOW 20 MIN: CPT | Mod: PBBFAC,25,PO | Performed by: FAMILY MEDICINE

## 2025-03-28 PROCEDURE — 72050 X-RAY EXAM NECK SPINE 4/5VWS: CPT | Mod: TC,FY,PO

## 2025-03-28 PROCEDURE — 99999 PR PBB SHADOW E&M-EST. PATIENT-LVL III: CPT | Mod: PBBFAC,,, | Performed by: FAMILY MEDICINE

## 2025-03-28 RX ORDER — TIZANIDINE 4 MG/1
4 TABLET ORAL EVERY 6 HOURS PRN
Qty: 30 TABLET | Refills: 0 | Status: SHIPPED | OUTPATIENT
Start: 2025-03-28 | End: 2025-04-07

## 2025-03-28 NOTE — PROGRESS NOTES
Subjective:       Patient ID: Sean Main is a 84 y.o. male.    Chief Complaint: Hypertension and Neck Pain    Here for f/u on chronic health issues    C/o 2 months of posterior neck pain.  There was no injury.  Pain is constant.  It is worse with rotation.  He has not tried any medication for this.  It was worse 4 days ago.        CAD s/p CABG - following with cardiology; asa 81mg  HTN - tolerating amlodipine 5mg daily and Coreg 3.125mg 1/2 BID  HLD, aortic atherosclerosis - tolerating Zetia 10, Lipitor 10mg daily  GERD/hiatal hernia - Protonix 40mg  Lumbar ddd - stable; pain controlled with home stretching    Exercises 3 times a week    Past Medical History:    CAD (coronary artery disease)                                 GERD (gastroesophageal reflux disease)                        Hernia of unspecified site of abdominal cavity*                 Comment:hiatal hernia    Diverticulosis                                                HTN (hypertension)                                            HLD (hyperlipidemia)                                          Past Surgical History:    CORONARY ARTERY BYPASS GRAFT                     4/4/2012        Comment:6 vessels    UPPER GASTROINTESTINAL ENDOSCOPY                 2011            Comment:hiatal hernia    COLONOSCOPY                                      2008            Comment:diverticulosis    CHOLECYSTECTOMY                                                LIVER BIOPSY                                                   Allergies:   -- No Known Drug Allergies    -- Pravastatin     --  myalgias    Social History    Marital Status:                         Social History Main Topics    Smoking Status: Never Smoker                      Smokeless Status: Never Used                        Alcohol Use: No              Drug Use: No                Current Outpatient Medications on File Prior to Visit:  amLODIPine (NORVASC) 5 MG tablet, TAKE 1 TABLET BY MOUTH ONCE  DAILY IN THE EVENING, Disp: 90 tablet, Rfl: 3  aspirin (ECOTRIN) 81 MG EC tablet, Take 81 mg by mouth once daily., Disp: , Rfl:   atorvastatin (LIPITOR) 10 MG tablet, Take 1/2 (one-half) tablet by mouth once daily, Disp: 45 tablet, Rfl: 3  carvediloL (COREG) 3.125 MG tablet, TAKE 1/2 (ONE-HALF) TABLET BY MOUTH TWICE DAILY WITH FOOD, Disp: 90 tablet, Rfl: 3  ezetimibe (ZETIA) 10 mg tablet, Take 1 tablet by mouth once daily, Disp: 90 tablet, Rfl: 3  multivitamin,tx-iron-minerals Tab, Take 1 tablet by mouth once daily., Disp: , Rfl:   niacin 50 MG Tab, Take 100 mg by mouth every evening., Disp: , Rfl:   pantoprazole (PROTONIX) 40 MG tablet, Take 1 tablet by mouth once daily, Disp: 90 tablet, Rfl: 1  saw palmetto 500 MG capsule, Take 500 mg by mouth 2 (two) times daily., Disp: , Rfl:   fluticasone propionate (FLONASE) 50 mcg/actuation nasal spray, Use 1 spray(s) in each nostril once daily (Patient not taking: Reported on 10/13/2023), Disp: 16 g, Rfl: 11    No current facility-administered medications on file prior to visit.                Review of patient's family history indicates:    Cirrhosis                      Neg Hx                    Liver cancer                   Neg Hx                    Liver disease                  Neg Hx                    Colon cancer                   Neg Hx                      Hypertension  Associated symptoms include neck pain. Pertinent negatives include no chest pain, headaches, palpitations or shortness of breath.   Neck Pain   Pertinent negatives include no chest pain, fever, headaches, numbness, trouble swallowing or weakness.     Review of Systems   Constitutional:  Negative for appetite change, fatigue, fever and unexpected weight change.   HENT:  Negative for congestion, ear discharge, ear pain, nosebleeds, postnasal drip, rhinorrhea, sinus pressure, sore throat and trouble swallowing.    Eyes:  Negative for pain, discharge, redness and visual disturbance.   Respiratory:   "Negative for apnea, cough, chest tightness, shortness of breath and wheezing.    Cardiovascular:  Negative for chest pain, palpitations and leg swelling.   Gastrointestinal:  Negative for abdominal distention, abdominal pain, blood in stool, constipation, diarrhea, nausea and vomiting.   Genitourinary:  Negative for difficulty urinating, dysuria, flank pain, frequency, hematuria, penile swelling (genital pain or swelling), scrotal swelling, testicular pain and urgency.   Musculoskeletal:  Positive for neck pain. Negative for back pain, gait problem, joint swelling and myalgias.   Skin:  Negative for color change, rash and wound.   Neurological:  Negative for dizziness, tremors, seizures, syncope, speech difficulty, weakness, light-headedness, numbness and headaches.   Hematological:  Negative for adenopathy. Does not bruise/bleed easily.   Psychiatric/Behavioral:  Negative for behavioral problems, confusion, hallucinations and suicidal ideas.        Objective:       /72   Pulse (!) 43   Ht 5' 9" (1.753 m)   Wt 70.2 kg (154 lb 12.2 oz)   SpO2 99%   BMI 22.85 kg/m²     Physical Exam  Constitutional:       General: He is not in acute distress.     Appearance: He is well-developed.   HENT:      Head: Normocephalic and atraumatic.      Right Ear: External ear normal.      Left Ear: External ear normal.      Mouth/Throat:      Pharynx: Uvula midline. No oropharyngeal exudate.   Eyes:      General: Lids are normal.      Conjunctiva/sclera: Conjunctivae normal.      Pupils: Pupils are equal, round, and reactive to light.   Neck:      Thyroid: No thyroid mass or thyromegaly.      Trachea: Phonation normal.   Cardiovascular:      Rate and Rhythm: Normal rate and regular rhythm.      Heart sounds: Normal heart sounds. No murmur heard.     No friction rub. No gallop.   Pulmonary:      Effort: Pulmonary effort is normal. No respiratory distress.      Breath sounds: Normal breath sounds. No wheezing or rales. "   Abdominal:      General: Bowel sounds are normal. There is no distension.      Palpations: Abdomen is soft. There is no mass.      Tenderness: There is no abdominal tenderness. There is no guarding or rebound.   Musculoskeletal:      Cervical back: Neck supple. Spasms and crepitus present. Pain with movement present. Decreased range of motion.   Lymphadenopathy:      Cervical: No cervical adenopathy.   Skin:     General: Skin is warm and dry.   Neurological:      Mental Status: He is alert and oriented to person, place, and time.      Cranial Nerves: No cranial nerve deficit.      Coordination: Coordination normal.   Psychiatric:         Speech: Speech normal.         Behavior: Behavior normal.         Thought Content: Thought content normal.         Judgment: Judgment normal.         Results for orders placed or performed in visit on 10/13/23   CBC Auto Differential    Collection Time: 10/13/23  2:23 PM   Result Value Ref Range    WBC 7.42 3.90 - 12.70 K/uL    RBC 4.87 4.60 - 6.20 M/uL    Hemoglobin 14.9 14.0 - 18.0 g/dL    Hematocrit 46.0 40.0 - 54.0 %    MCV 95 82 - 98 fL    MCH 30.6 27.0 - 31.0 pg    MCHC 32.4 32.0 - 36.0 g/dL    RDW 12.9 11.5 - 14.5 %    Platelets 183 150 - 450 K/uL    MPV 12.4 9.2 - 12.9 fL    Immature Granulocytes 0.3 0.0 - 0.5 %    Gran # (ANC) 4.8 1.8 - 7.7 K/uL    Immature Grans (Abs) 0.02 0.00 - 0.04 K/uL    Lymph # 2.0 1.0 - 4.8 K/uL    Mono # 0.6 0.3 - 1.0 K/uL    Eos # 0.1 0.0 - 0.5 K/uL    Baso # 0.02 0.00 - 0.20 K/uL    nRBC 0 0 /100 WBC    Gran % 64.2 38.0 - 73.0 %    Lymph % 26.3 18.0 - 48.0 %    Mono % 8.2 4.0 - 15.0 %    Eosinophil % 0.7 0.0 - 8.0 %    Basophil % 0.3 0.0 - 1.9 %    Differential Method Automated    Comprehensive Metabolic Panel    Collection Time: 10/13/23  2:23 PM   Result Value Ref Range    Sodium 137 136 - 145 mmol/L    Potassium 5.1 3.5 - 5.1 mmol/L    Chloride 103 95 - 110 mmol/L    CO2 26 23 - 29 mmol/L    Glucose 84 70 - 110 mg/dL    BUN 12 8 - 23 mg/dL     Creatinine 0.9 0.5 - 1.4 mg/dL    Calcium 9.3 8.7 - 10.5 mg/dL    Total Protein 6.8 6.0 - 8.4 g/dL    Albumin 3.9 3.5 - 5.2 g/dL    Total Bilirubin 0.7 0.1 - 1.0 mg/dL    Alkaline Phosphatase 76 55 - 135 U/L    AST 33 10 - 40 U/L    ALT 30 10 - 44 U/L    eGFR >60.0 >60 mL/min/1.73 m^2    Anion Gap 8 8 - 16 mmol/L   Lipid Panel    Collection Time: 10/13/23  2:23 PM   Result Value Ref Range    Cholesterol 138 120 - 199 mg/dL    Triglycerides 98 30 - 150 mg/dL    HDL 49 40 - 75 mg/dL    LDL Cholesterol 69.4 63.0 - 159.0 mg/dL    HDL/Cholesterol Ratio 35.5 20.0 - 50.0 %    Total Cholesterol/HDL Ratio 2.8 2.0 - 5.0    Non-HDL Cholesterol 89 mg/dL   PSA, Screening    Collection Time: 10/13/23  2:23 PM   Result Value Ref Range    PSA, Screen 1.8 0.00 - 4.00 ng/mL     Assessment:       1. Posterior neck pain    2. Essential hypertension    3. Atherosclerosis of aorta            Plan:       Posterior neck pain  -     X-Ray Cervical Spine Complete 5 view; Future; Expected date: 03/28/2025  -     tiZANidine (ZANAFLEX) 4 MG tablet; Take 1 tablet (4 mg total) by mouth every 6 (six) hours as needed (neck pain).  Dispense: 30 tablet; Refill: 0  -     Ambulatory Referral/Consult to Physical Therapy; Future; Expected date: 04/04/2025    Essential hypertension    Atherosclerosis of aorta            Cspine xrays today  Suspect combination of cervical spondylosis and muscle tension  Home stretching  PT referral  Tizanidine PRN  Counseled on regular exercise, maintenance of a healthy weight, balanced diet rich in fruits/vegetables and lean protein, and avoidance of unhealthy habits like smoking and excessive alcohol intake.  Continue present meds  Continue back stretching and lifting precautions  F/u as needed

## 2025-03-31 ENCOUNTER — EXTERNAL CHRONIC CARE MANAGEMENT (OUTPATIENT)
Dept: PRIMARY CARE CLINIC | Facility: CLINIC | Age: 85
End: 2025-03-31
Payer: MEDICARE

## 2025-03-31 ENCOUNTER — CLINICAL SUPPORT (OUTPATIENT)
Dept: REHABILITATION | Facility: HOSPITAL | Age: 85
End: 2025-03-31
Payer: MEDICARE

## 2025-03-31 DIAGNOSIS — R29.898 DECREASED ROM OF NECK: ICD-10-CM

## 2025-03-31 DIAGNOSIS — M54.2 NECK PAIN: Primary | ICD-10-CM

## 2025-03-31 DIAGNOSIS — M54.2 POSTERIOR NECK PAIN: ICD-10-CM

## 2025-03-31 PROBLEM — M79.651 PAIN OF RIGHT THIGH: Status: RESOLVED | Noted: 2023-03-14 | Resolved: 2025-03-31

## 2025-03-31 PROBLEM — Z91.81 PERSONAL HISTORY OF FALL: Status: RESOLVED | Noted: 2023-03-14 | Resolved: 2025-03-31

## 2025-03-31 PROBLEM — R26.9 ABNORMAL GAIT: Status: RESOLVED | Noted: 2023-03-14 | Resolved: 2025-03-31

## 2025-03-31 PROCEDURE — 99439 CHRNC CARE MGMT STAF EA ADDL: CPT | Mod: S$PBB,,, | Performed by: FAMILY MEDICINE

## 2025-03-31 PROCEDURE — 97161 PT EVAL LOW COMPLEX 20 MIN: CPT | Mod: PO | Performed by: PHYSICAL THERAPIST

## 2025-03-31 PROCEDURE — 99490 CHRNC CARE MGMT STAFF 1ST 20: CPT | Mod: PBBFAC,PO | Performed by: FAMILY MEDICINE

## 2025-03-31 PROCEDURE — 99439 CHRNC CARE MGMT STAF EA ADDL: CPT | Mod: PBBFAC,PO | Performed by: FAMILY MEDICINE

## 2025-03-31 PROCEDURE — 97530 THERAPEUTIC ACTIVITIES: CPT | Mod: PO | Performed by: PHYSICAL THERAPIST

## 2025-03-31 PROCEDURE — 97140 MANUAL THERAPY 1/> REGIONS: CPT | Mod: PO | Performed by: PHYSICAL THERAPIST

## 2025-03-31 PROCEDURE — 99490 CHRNC CARE MGMT STAFF 1ST 20: CPT | Mod: S$PBB,,, | Performed by: FAMILY MEDICINE

## 2025-03-31 PROCEDURE — 97112 NEUROMUSCULAR REEDUCATION: CPT | Mod: PO | Performed by: PHYSICAL THERAPIST

## 2025-03-31 NOTE — PROGRESS NOTES
Outpatient Rehab    Physical Therapy Evaluation    Patient Name: Sean Main  MRN: [id number]  YOB: 1940  Encounter Date: 3/31/2025    Therapy Diagnosis:   Encounter Diagnoses   Name Primary?    Posterior neck pain     Neck pain Yes    Decreased ROM of neck      Physician: Stevenson Garland MD    Physician Orders: Eval and Treat  Medical Diagnosis: Posterior neck pain  Posterior neck pain    Visit # / Visits Authorized:  1 / 20  Insurance Authorization Period: 3/28/2025 to 12/31/2025  Date of Evaluation: 3/31/2025  Plan of Care Certification: 3/31/2025 to 6/23/25     Time In: 1000   Time Out: 1110  Total Time: 70   Total Billable Time:  70 min    Intake Outcome Measure for FOTO Survey    Therapist reviewed FOTO scores for Sean Main on 3/31/2025.   FOTO report - see Media section or FOTO account episode details.     Intake Score: 28%         Subjective   History of Present Illness  Sean is a 84 y.o. male who reports to physical therapy with a chief concern of Pt c/o bilateral cervical pain that can radiate to top of head.     The patient reports a medical diagnosis of Diagnosis  M54.2 (ICD-10-CM) - Posterior neck pain.    Diagnostic tests related to this condition: MRI studies.   MRI Studies Details: MRI cerv 3/28/25:  Vertebral body heights are preserved. No fractures or bony destructive changes. Alignment is within normal limits. Severe disc height loss at C3-C4, C4-C5, and C5-C6 as well as C7-T1 and moderate disc height loss at C6-C7 with multilevel shallow endplate centered osteophytes. No abnormal prevertebral soft tissue thickening. Partially imaged median sternotomy changes.    History of Present Condition/Illness: Pt reports onset of posterior cervical pain approximately 2 months ago. He reports pain and limitation of movement with turning his head and with looking down. He states it got so bad that when he would swallow, he would get pain to the top of his head. He  normally sleeps on his stomach, but is trying to sleep more on his back. He has modified to sleeping on his side, but then he awakens with shoulder pain. He has pain with movement that resolves when he brings his head back to midline. Of note, he recently started making jigsaw puzzles for leisure and is aware of prolonged periods of looking down since he has been doing that. He denies any prior neck pain before a few months ago.         Past Medical History/Physical Systems Review:   Sean Main  has a past medical history of CAD (coronary artery disease), Compression fracture of T12 vertebra, DDD (degenerative disc disease), lumbar, Diverticulosis, GERD (gastroesophageal reflux disease), Hemorrhoid, Hernia of unspecified site of abdominal cavity without mention of obstruction or gangrene, HLD (hyperlipidemia), and HTN (hypertension).    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    Sean has a current medication list which includes the following prescription(s): amlodipine, aspirin, atorvastatin, carvedilol, ezetimibe, multivitamin,tx-iron-minerals, fish oil-omega-3 fatty acids, pantoprazole, saw palmetto, and tizanidine.    Review of patient's allergies indicates:   Allergen Reactions    Lipitor [atorvastatin]      Myalgia      Pravastatin      myalgias        Prior Therapy: not for neckn  Prior Treatment: no  Social History:   lives with their son  Occupation: retired, supervisor with [employer]  Leisure: walk 3x/week, jigsSyapse puzzles - in last month    Prior Level of Function: able to turn head and look down comfortably  Current Level of Function: difficulty and limited with turning head and looking down  DME owned/used: no  Gym Membership: no    Pain:  Current 0/10, worst 7/10, best 0/10   Location:  bilateral posterior cervical to top of head  Description: Aching  Aggravating Factors: turning head, looking down  Easing Factors: keep head in alignment  Disturbed Sleep: no    Pattern of pain questions:  1.  Where is your pain the worst? Posterior cervical spine  2.  Is your pain constant or intermittent? intermittent  3.  Does bending forward make your typical pain worse? yes  4.  Since the start of your back pain, has there been a change in your bowel or bladder? no  5.  What can't you do now that you use to be able to do? Turn head or look down comfortably    Pts goals: to see if therapy will help, to resolve pain    Red Flag Screening:   Cough/Sneeze Strain: (--)  Bladder/Bowel: (--)  Falls: (+) 2 weeks ago tripped walking around people on [place]  Night pain: (--)  Unexplained weight loss: (+) over a few years  General health: HTN        Objective        Postural examination/scapula alignment: Rounded shoulder, Head forward, and Increased kyphosis  Joint integrity: Firm end feeling throughout cervical spine  Skin integrity:WNL   Edema: None  Sitting: flexed, forward head  Standing: as above  Correction of posture: better with lumbar roll    Range of Motion - MOVEMENT LOSS    ROM Loss   Flexion minimal loss, pain neck to top of head   Extension moderate loss, decreased pain   Side bending Right moderate loss   Side bending Left moderate loss   Rotation Right major loss, more limited than left with cervical pain to top of head   Rotation Left major loss, cervical pain to top of head   Protraction minimal loss   Retraction  moderate loss     Upper Extremity Strength  (R) UE  (L) UE    Shoulder flexion: 4/5 Shoulder flexion: 4/5   Shoulder Abduction: 4/5 Shoulder abduction: 4/5   Elbow flexion: 4+/5 Elbow flexion: 4+/5   Elbow extension: 4/5 Elbow extension: 4/5   Wrist flexion: 5/5 Wrist flexion: 5/5   Wrist extension: 5/5 Wrist extension: 5/5    5/5 : 5/5     NEUROLOGICAL SCREEN:    Sensory Deficits:  no    Special Tests:   Test Name  Testing Result   Compression (--)   Distraction (--)   Neural Tension Test (--)   Saddle Sensation (--)     Reflexes:    Left Right   Biceps  2+ 2+   Brachioradialis  2+ 2+   Clonus (--) (--)   Babinski (--) (--)     REPEATED TEST MOVEMENTS:   Repeated Protraction in Sitting worse   Repeated Flexion in Sitting worse   Repeated Retraction in Sitting  better   Repeated Retraction Extension in Sitting better         Treatment:  Manual Therapy  MT 1: manual mob/stretching into sidebending, rotation and levator scap stretch positions  Balance/Neuromuscular Re-Education  NMR 1: cervical retraction 5 sec x10  NMR 2: cervical retraction with extension x10  NMR 3: thoracic extension over chair x10  NMR 4: scapular retraction x10  Therapeutic Activity  TA 1: instruct in improved sitting posture, use of lumbar support: instruct in improved sleep position with use of cervical vangie: instruct in activity modification with reading, making jigsaw puzzles and with watching television    Time Entry(in minutes):  PT Evaluation (Low) Time Entry: 20  Manual Therapy Time Entry: 15  Neuromuscular Re-Education Time Entry: 15  Therapeutic Activity Time Entry: 20    Assessment & Plan   Assessment  Sean presents with a condition of Low complexity.   Presentation of Symptoms: Evolving  Will Comorbidities Impact Care: No       Functional Limitations: Functional mobility, Pain with ADLs/IADLs, Range of motion  Impairments: Abnormal or restricted range of motion, Activity intolerance, Pain with functional activity    Prognosis: Excellent  Assessment Details: Pt presents with medical diagnosis of posterior neck pain. Pain is reproduced with cervical rotation and flexion/protraction movements and reduced with retraction and retraction/extension indicating directional preference of extension.   Upon physical assessment, pt demonstrates issues with posture including increased thoracic kyphosis and forward head  posture. Mobility issues include significant loss of cervical ROM, mild loss of shoulder ROM. Strength issues including decreased scapulo-thoracic strength.  All of the above noted supports potential  classification as a pattern 1 REP with recurrent/ or consistent symptoms.Pt would benefit from improved cardiovascular and muscular endurance, neuromuscular re-education for posture, coordination, and muscular recruitment and education on positional offloading techniques to decrease the intensity and frequency of flare-ups.    Plan  From a physical therapy perspective, the patient would benefit from: Skilled Rehab Services    Planned therapy interventions include: Therapeutic exercise, Therapeutic activities, Neuromuscular re-education, Manual therapy, and Other (Comment). Dry needling          Visit Frequency: 2 times Per Week for 12 Weeks.  Other/tapered frequency details: Decrease frequency as sx improve.    This plan was discussed with Patient.   Discussion participants: Agreed Upon Plan of Care             Patient's spiritual, cultural, and educational needs considered and patient agreeable to plan of care and goals.     Education  Education was done with Patient.           Instructed in HEP, frequent stretching throughout day, correction of posture, sleep position and activity modification with reading, making puzzles and watching television.       Goals:   Active       A. Short term goals       - Pt will demonstratte increased cervical ROM by 5 degrees which results in improved  ROM of neck for ease with ADLs and driving. Appropriate and Ongoing        Start:  03/31/25    Expected End:  05/12/25            - Pt will demonstrate independence with reducing or controlling symptoms with ther ex, movement, or position independently, able to reduce pain 1-2 points on pain scale using strategies taught in therapy.  Appropriate and Ongoing        Start:  03/31/25    Expected End:  05/12/25            - Pt will  demonstrate increased strength of scapulo-thoracic musculature by 1/3 grade resulting in improved ability to perform bending, lifting, and carrying activities safely and confidently. Appropriate and Ongoing        Start:  03/31/25    Expected End:  05/12/25               B. Long term goals       - Pt will demonstratte increased cervical ROM by 10 degrees from initial test which results in functional ROM of neck for ease with ADLs and driving.  Appropriate and Ongoing        Start:  03/31/25    Expected End:  06/23/25            - Pt will demonstrate reduced pain and improved functional outcomes as reported on the FOTO by reaching an improved intake score by >/= 6% functional ability in order to demonstrate subjective improvement in patient's condition.        Start:  03/31/25    Expected End:  06/23/25            - Pt will demonstrate independence with reducing or controlling symptoms with ther ex, movement, or position independently, able to reduce pain 2-4 points on pain scale using strategies taught in therapy. Appropriate and Ongoing        Start:  03/31/25    Expected End:  06/23/25            - Pt will demonstrate independence with the HEP at discharge. Appropriate and Ongoing        Start:  03/31/25    Expected End:  06/23/25                Chel Cook PT

## 2025-03-31 NOTE — PATIENT INSTRUCTIONS
Cervical Retractions (with or without overpressure)    In sitting, move head backwards from neutral position without tucking chin. If instructed, push on chin with your fingers to increase the movement. Return to neutral and repeat. Hold 5 seconds. Repeat 10x, 2-3x/day      Cervical Retraction/Extension    In sitting, begin with head in neutral position. Move head backwards without tucking chin. Keeping the retracted position, slowly extend neck as far back as possible. Keeping retraction, return from extension and relax. Repeat 10x, 2-3x/day      Thoracic Extension over Chair    Sit up nice and tall on a hard-backed chair.  Put your hands behind your neck and clasp behind.  Using your thoracic spine (mid-upper back) extend over the chair.  Repeat 10x, 2-3x/day      SCAPULAR RETRACTIONS    Move your shoulder blades back and down. Hold, relax and repeat. Hold 3 seconds, repeat 10x, 2-3x/day

## 2025-04-01 ENCOUNTER — RESULTS FOLLOW-UP (OUTPATIENT)
Dept: FAMILY MEDICINE | Facility: CLINIC | Age: 85
End: 2025-04-01

## 2025-04-01 ENCOUNTER — TELEPHONE (OUTPATIENT)
Dept: FAMILY MEDICINE | Facility: CLINIC | Age: 85
End: 2025-04-01
Payer: MEDICARE

## 2025-04-01 NOTE — TELEPHONE ENCOUNTER
----- Message from David sent at 4/1/2025  3:04 PM CDT -----  Type:  Patient Returning CallWho Called:ptWho Left Message for Patient:Jarrell the patient know what this is regarding?: resultsWould the patient rather a call back or a response via [facility]? Call [facility] Call Back Number:[phone] Additional Information: please call to discuss  
Spoke with patient regarding xray's.  
Yes

## 2025-04-01 NOTE — TELEPHONE ENCOUNTER
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 He should proceed with physical therapy as planned.

## 2025-04-03 ENCOUNTER — CLINICAL SUPPORT (OUTPATIENT)
Dept: REHABILITATION | Facility: HOSPITAL | Age: 85
End: 2025-04-03
Payer: MEDICARE

## 2025-04-03 DIAGNOSIS — R29.898 DECREASED ROM OF NECK: ICD-10-CM

## 2025-04-03 DIAGNOSIS — M54.2 NECK PAIN: Primary | ICD-10-CM

## 2025-04-03 PROCEDURE — 97112 NEUROMUSCULAR REEDUCATION: CPT | Mod: PO | Performed by: PHYSICAL THERAPIST

## 2025-04-03 PROCEDURE — 97110 THERAPEUTIC EXERCISES: CPT | Mod: PO | Performed by: PHYSICAL THERAPIST

## 2025-04-03 PROCEDURE — 97140 MANUAL THERAPY 1/> REGIONS: CPT | Mod: PO | Performed by: PHYSICAL THERAPIST

## 2025-04-03 NOTE — PROGRESS NOTES
Outpatient Rehab    Physical Therapy Visit    Patient Name: Sean Main  MRN: [id number]  YOB: 1940  Encounter Date: 4/3/2025    Therapy Diagnosis:   Encounter Diagnoses   Name Primary?    Neck pain Yes    Decreased ROM of neck      Physician: Stevenson Garland MD    Physician Orders: Eval and Treat  Medical Diagnosis: Posterior neck pain    Visit # / Visits Authorized:  2 / 10  Insurance Authorization Period: 3/28/2025 to 12/31/2025  Date of Evaluation: 3/31/25  Plan of Care Certification: 3/31/25 to 6/23/25     PT/PTA:     Number of PTA visits since last PT visit:   Time In: 1050   Time Out: 1145  Total Time: 55   Total Billable Time:  55    FOTO:  Intake Score: 28%  Survey Score 1:  %  Survey Score 2:  %         Subjective   Pt reports continued pain with turning his head, but feels he is able to turn further. No pain in midline. 5/10 at end range rotation..  Pain reported as 5/10.      Objective            Treatment:  Therapeutic Exercise  TE 1: UBE retro 8 min  TE 2: cervical sidebending stretch with light overpressure 3x 10 sec ea  TE 3: levator scapulae stretch with light overpressure 3x 10 sec ea  TE 4: cervical rotation with light overpressure 3x 10 sec ea  Manual Therapy  MT 1: manual mob/stretching into sidebending, rotation and levator scap stretch positions  Balance/Neuromuscular Re-Education  NMR 1: cervical retraction 5 sec x10  NMR 2: cervical retraction with extension x10  NMR 3: thoracic extension over chair x10  NMR 4: scapular retraction x10  NMR 5: open book x10 ea  NMR 6: Breugger's RTB x20    Time Entry(in minutes):  Manual Therapy Time Entry: 15  Neuromuscular Re-Education Time Entry: 25  Therapeutic Exercise Time Entry: 15    Assessment & Plan   Assessment: Discussed sleep position with pt and posture. He does have rigidity of movement of cervical spine. Encouraged consistency with stretching to gain some added mobility and decrease pain. Progressed HEP and given  handout for reference.       Patient will continue to benefit from skilled outpatient physical therapy to address the deficits listed in the problem list box on initial evaluation, provide pt/family education and to maximize pt's level of independence in the home and community environment.     Patient's spiritual, cultural, and educational needs considered and patient agreeable to plan of care and goals.           Plan: Cont per POC progressing toward established goals.    Goals:   Active       A. Short term goals       - Pt will demonstratte increased cervical ROM by 5 degrees which results in improved  ROM of neck for ease with ADLs and driving. Appropriate and Ongoing        Start:  03/31/25    Expected End:  05/12/25            - Pt will demonstrate independence with reducing or controlling symptoms with ther ex, movement, or position independently, able to reduce pain 1-2 points on pain scale using strategies taught in therapy.  Appropriate and Ongoing        Start:  03/31/25    Expected End:  05/12/25            - Pt will demonstrate increased strength of scapulo-thoracic musculature by 1/3 grade resulting in improved ability to perform bending, lifting, and carrying activities safely and confidently. Appropriate and Ongoing        Start:  03/31/25    Expected End:  05/12/25               B. Long term goals       - Pt will demonstratte increased cervical ROM by 10 degrees from initial test which results in functional ROM of neck for ease with ADLs and driving.  Appropriate and Ongoing        Start:  03/31/25    Expected End:  06/23/25            - Pt will demonstrate reduced pain and improved functional outcomes as reported on the FOTO by reaching an improved intake score by >/= 6% functional ability in order to demonstrate subjective improvement in patient's condition.        Start:  03/31/25    Expected End:  06/23/25            - Pt will demonstrate independence with reducing or controlling symptoms with  ther ex, movement, or position independently, able to reduce pain 2-4 points on pain scale using strategies taught in therapy. Appropriate and Ongoing        Start:  03/31/25    Expected End:  06/23/25            - Pt will demonstrate independence with the HEP at discharge. Appropriate and Ongoing        Start:  03/31/25    Expected End:  06/23/25                Chel Cook PT

## 2025-04-03 NOTE — PATIENT INSTRUCTIONS
UPPER TRAP STRETCH - HAND BEHIND BACK AND TOP OF HEAD    Begin by retracting your head back into a chin tuck position. Next, place one hand behind your back and gently pull your head towards the opposite side with the help of your other arm. Hold 10 seconds. Repeat 3x each side. Perform 2-3x/day.        LEVATOR SCAPULAE STRETCH - HAND BEHIND BACK AND TOP OF HEAD    Place your arm on the affected side behind your back and use your other hand to pull your head downward and towards the opposite side.     You should be looking towards your opposite pocket of the target side.Hold 10 seconds. Repeat 3x each side. Perform 2-3x/day.        CERVICAL ROTATION    Turn your head towards the side, then return back to looking straight ahead. Give light overpressure with hand.Hold 10 seconds. Repeat 3x each side. Perform 2-3x/day.    Open Book    Lie on your side with arms straight out and hands pressed together in front of you. Knees are pulled up and bent towards the chest. From here, turn your torso so your back is lying flat on the ground, and your arms have opened up like a book. Hold this position before returning to start. 10x each side.      Hitch Hiker/Breugger's/Bilateral External Rotation    Hold a piece of band in both hands. Position your elbows at 90 degrees and keep your arms by your sides. Engaging the shoulder blade muscles and keeping your arms by your side, rotate your arms outward and then return to the starting position.  Perform 2 sets of 10

## 2025-04-09 ENCOUNTER — CLINICAL SUPPORT (OUTPATIENT)
Dept: REHABILITATION | Facility: HOSPITAL | Age: 85
End: 2025-04-09
Payer: MEDICARE

## 2025-04-09 DIAGNOSIS — M54.2 NECK PAIN: Primary | ICD-10-CM

## 2025-04-09 DIAGNOSIS — R29.898 DECREASED ROM OF NECK: ICD-10-CM

## 2025-04-09 PROCEDURE — 97112 NEUROMUSCULAR REEDUCATION: CPT | Mod: PO | Performed by: PHYSICAL THERAPIST

## 2025-04-09 PROCEDURE — 97110 THERAPEUTIC EXERCISES: CPT | Mod: PO | Performed by: PHYSICAL THERAPIST

## 2025-04-09 PROCEDURE — 97140 MANUAL THERAPY 1/> REGIONS: CPT | Mod: PO | Performed by: PHYSICAL THERAPIST

## 2025-04-09 NOTE — PROGRESS NOTES
Outpatient Rehab    Physical Therapy Visit    Patient Name: Sean Main  MRN: [id number]  YOB: 1940  Encounter Date: 4/9/2025    Therapy Diagnosis:   Encounter Diagnoses   Name Primary?    Neck pain Yes    Decreased ROM of neck        Physician: Stevenson Garland MD    Physician Orders: Eval and Treat  Medical Diagnosis: Posterior neck pain    Visit # / Visits Authorized:  3 / 10  Insurance Authorization Period: 3/28/2025 to 12/31/2025  Date of Evaluation: 3/31/25  Plan of Care Certification: 3/31/25 to 6/23/25     PT/PTA:     Number of PTA visits since last PT visit:   Time In: 0955   Time Out: 1050  Total Time: 55   Total Billable Time: 5555    FOTO:  Intake Score: 28%  Survey Score 1:  %  Survey Score 2:  %         Subjective   Pt reports decreased cervical pain with stretching. He is having most difficulty with sleep and awakening on sides with shoulder pain. He is trying to sleep on back, but winds up on sides..  Pain reported as 5/10. 5/10 with cervical rotation to end range. 0/10 pain in midline.    Objective            Treatment:  Therapeutic Exercise  TE 1: UBE retro 10 min  TE 2: cervical sidebending stretch with light overpressure 3x 10 sec ea  TE 3: levator scapulae stretch with light overpressure 3x 10 sec ea  TE 4: cervical rotation with light overpressure 3x 10 sec ea  TE 5: wand ex flexion x10  TE 6: wand ex ER x10  Manual Therapy  MT 1: manual mob/stretching into sidebending, rotation and levator scap stretch positions  MT 2: mob bilateral shoulders  Balance/Neuromuscular Re-Education  NMR 1: cervical retraction 5 sec x10  NMR 2: cervical retraction with extension x10  NMR 3: thoracic extension over chair x10  NMR 4: scapular retraction x10  NMR 5: open book x10 ea  NMR 6: Breugger's RTB x20    Time Entry(in minutes):  Manual Therapy Time Entry: 15  Neuromuscular Re-Education Time Entry: 25  Therapeutic Exercise Time Entry: 15    Assessment & Plan   Assessment: Pt aware of  improvement with decreased cervical pain. Sleep is greatest issue, awakening with pain and stiffness. He feels cervical roll has helped in pillow, but he has shoulder pain on awakening. STretching in morning helps. Added shoulder stretches to HEP. Pt tolerated well. Right cervical rotation and left sidebending most limited with firm end feel.       Patient will continue to benefit from skilled outpatient physical therapy to address the deficits listed in the problem list box on initial evaluation, provide pt/family education and to maximize pt's level of independence in the home and community environment.     Patient's spiritual, cultural, and educational needs considered and patient agreeable to plan of care and goals.     Education  Education was done with Patient. The patient's learning style includes Demonstration and Listening. The patient Demonstrates understanding.         Patient  was instructed in home exercise program  to address the deficits listed above and to address overall condition and quality of life.  - Patient was educated on all the above exercise prior/during/after for proper posture, positioning, and execution for safe performance with home exercise program.       Plan: Cont per POC progressing toward established goals.    Goals:   Active       A. Short term goals       - Pt will demonstratte increased cervical ROM by 5 degrees which results in improved  ROM of neck for ease with ADLs and driving. Appropriate and Ongoing        Start:  03/31/25    Expected End:  05/12/25            - Pt will demonstrate independence with reducing or controlling symptoms with ther ex, movement, or position independently, able to reduce pain 1-2 points on pain scale using strategies taught in therapy.  Appropriate and Ongoing        Start:  03/31/25    Expected End:  05/12/25            - Pt will demonstrate increased strength of scapulo-thoracic musculature by 1/3 grade resulting in improved ability to perform  bending, lifting, and carrying activities safely and confidently. Appropriate and Ongoing        Start:  03/31/25    Expected End:  05/12/25               B. Long term goals       - Pt will demonstratte increased cervical ROM by 10 degrees from initial test which results in functional ROM of neck for ease with ADLs and driving.  Appropriate and Ongoing        Start:  03/31/25    Expected End:  06/23/25            - Pt will demonstrate reduced pain and improved functional outcomes as reported on the FOTO by reaching an improved intake score by >/= 6% functional ability in order to demonstrate subjective improvement in patient's condition.        Start:  03/31/25    Expected End:  06/23/25            - Pt will demonstrate independence with reducing or controlling symptoms with ther ex, movement, or position independently, able to reduce pain 2-4 points on pain scale using strategies taught in therapy. Appropriate and Ongoing        Start:  03/31/25    Expected End:  06/23/25            - Pt will demonstrate independence with the HEP at discharge. Appropriate and Ongoing        Start:  03/31/25    Expected End:  06/23/25                Chel Cook, PT

## 2025-04-09 NOTE — PATIENT INSTRUCTIONS
WAND FLEXION  - SUPINE    Lying on  your back and holding a wand or cane, slowly raise the wand towards overhead. Use your unaffected arm to assist with the movement.  10x each, 2x/day        WAND EXTERNAL ROTATION STRETCH  90-90 - ER    Lying on your back holding a wand with your elbows out to the side and rested down, roll your arms back towards over head until a stretch is felt. 10x, 2x/day

## 2025-04-11 ENCOUNTER — CLINICAL SUPPORT (OUTPATIENT)
Dept: REHABILITATION | Facility: HOSPITAL | Age: 85
End: 2025-04-11
Payer: MEDICARE

## 2025-04-11 DIAGNOSIS — M54.2 NECK PAIN: Primary | ICD-10-CM

## 2025-04-11 DIAGNOSIS — R29.898 DECREASED ROM OF NECK: ICD-10-CM

## 2025-04-11 PROCEDURE — 97140 MANUAL THERAPY 1/> REGIONS: CPT | Mod: PO | Performed by: PHYSICAL THERAPIST

## 2025-04-11 PROCEDURE — 97110 THERAPEUTIC EXERCISES: CPT | Mod: PO | Performed by: PHYSICAL THERAPIST

## 2025-04-11 PROCEDURE — 97112 NEUROMUSCULAR REEDUCATION: CPT | Mod: PO | Performed by: PHYSICAL THERAPIST

## 2025-04-11 NOTE — PROGRESS NOTES
Outpatient Rehab    Physical Therapy Visit    Patient Name: Sean Main  MRN: [id number]  YOB: 1940  Encounter Date: 4/11/2025    Therapy Diagnosis:   Encounter Diagnoses   Name Primary?    Neck pain Yes    Decreased ROM of neck        Physician: Stevenson Garland MD    Physician Orders: Eval and Treat  Medical Diagnosis: Posterior neck pain    Visit # / Visits Authorized:  4 / 10  Insurance Authorization Period: 3/28/2025 to 12/31/2025  Date of Evaluation: 3/31/25  Plan of Care Certification: 3/31/25 to 6/23/25     PT/PTA:     Number of PTA visits since last PT visit:   Time In: 0955   Time Out: 1055  Total Time: 60   Total Billable Time: 6055    FOTO:  Intake Score: 28%  Survey Score 1:  %  Survey Score 2:  %         Subjective   Pt states pain is about the same at end range of motion, but does feel better after he stretches. He is aware of improved ROM. No pain in midline. 5/10 at end range especially with right rotation..  Pain reported as 5/10. 5/10 with cervical rotation to end range. 0/10 pain in midline.    Objective            Treatment:  Therapeutic Exercise  TE 1: UBE retro 10 min  TE 2: cervical sidebending stretch with light overpressure 3x 10 sec ea  TE 3: levator scapulae stretch with light overpressure 3x 10 sec ea  TE 4: cervical rotation with light overpressure 3x 10 sec ea  TE 5: wand ex flexion x10  TE 6: wand ex ER x10  Manual Therapy  MT 1: manual mob/stretching into sidebending, rotation and levator scap stretch positions  MT 2: mob bilateral shoulders  MT 3: STM neck  Balance/Neuromuscular Re-Education  NMR 1: cervical retraction 5 sec x10  NMR 2: cervical retraction with extension x10  NMR 3: thoracic extension over chair x10  NMR 4: scapular retraction x10  NMR 5: open book x10 ea  NMR 6: Breugger's RTB x20    Time Entry(in minutes):  Manual Therapy Time Entry: 15  Neuromuscular Re-Education Time Entry: 25  Therapeutic Exercise Time Entry: 20    Assessment & Plan    Assessment: Improving cervical rotation, but still significantly limited with pain at end range of right rotation. Worst on awakening, especially in shoulders and improves with movement. He is able to reduce pain sooner by stretching. He questions if pain medication is helping. Encouraged him to stretch first upon awakening before taking pain med to see if he can reduce pain to same level just by improving mobility. Able to improve rotation following soft tissue mobilization.       Patient will continue to benefit from skilled outpatient physical therapy to address the deficits listed in the problem list box on initial evaluation, provide pt/family education and to maximize pt's level of independence in the home and community environment.     Patient's spiritual, cultural, and educational needs considered and patient agreeable to plan of care and goals.     Education  Education was done with Patient.           Patient  was instructed in home exercise program  to address the deficits listed above and to address overall condition and quality of life.  - Patient was educated on all the above exercise prior/during/after for proper posture, positioning, and execution for safe performance with home exercise program.         Plan: Cont per POC progressing toward established goals.    Goals:   Active       A. Short term goals       - Pt will demonstratte increased cervical ROM by 5 degrees which results in improved  ROM of neck for ease with ADLs and driving. Appropriate and Ongoing        Start:  03/31/25    Expected End:  05/12/25            - Pt will demonstrate independence with reducing or controlling symptoms with ther ex, movement, or position independently, able to reduce pain 1-2 points on pain scale using strategies taught in therapy.  Appropriate and Ongoing        Start:  03/31/25    Expected End:  05/12/25            - Pt will demonstrate increased strength of scapulo-thoracic musculature by 1/3 grade  resulting in improved ability to perform bending, lifting, and carrying activities safely and confidently. Appropriate and Ongoing        Start:  03/31/25    Expected End:  05/12/25               B. Long term goals       - Pt will demonstratte increased cervical ROM by 10 degrees from initial test which results in functional ROM of neck for ease with ADLs and driving.  Appropriate and Ongoing        Start:  03/31/25    Expected End:  06/23/25            - Pt will demonstrate reduced pain and improved functional outcomes as reported on the FOTO by reaching an improved intake score by >/= 6% functional ability in order to demonstrate subjective improvement in patient's condition.        Start:  03/31/25    Expected End:  06/23/25            - Pt will demonstrate independence with reducing or controlling symptoms with ther ex, movement, or position independently, able to reduce pain 2-4 points on pain scale using strategies taught in therapy. Appropriate and Ongoing        Start:  03/31/25    Expected End:  06/23/25            - Pt will demonstrate independence with the HEP at discharge. Appropriate and Ongoing        Start:  03/31/25    Expected End:  06/23/25                Chel Cook PT

## 2025-04-14 ENCOUNTER — CLINICAL SUPPORT (OUTPATIENT)
Dept: REHABILITATION | Facility: HOSPITAL | Age: 85
End: 2025-04-14
Payer: MEDICARE

## 2025-04-14 DIAGNOSIS — M54.2 NECK PAIN: Primary | ICD-10-CM

## 2025-04-14 DIAGNOSIS — R29.898 DECREASED ROM OF NECK: ICD-10-CM

## 2025-04-14 PROCEDURE — 97112 NEUROMUSCULAR REEDUCATION: CPT | Mod: PO | Performed by: PHYSICAL THERAPIST

## 2025-04-14 PROCEDURE — 97110 THERAPEUTIC EXERCISES: CPT | Mod: PO | Performed by: PHYSICAL THERAPIST

## 2025-04-14 NOTE — PATIENT INSTRUCTIONS
ELASTIC BAND ROWS   Holding elastic band with both hands, draw back the band as you bend your elbows. Keep your elbows near the side of your body. 20x          ELASTIC BAND SHOULDER EXTERNAL ROTATION - ER  While holding an elastic band at your side with your elbow bent, start with your hand near your stomach and then pull the band away. Keep your elbow at your side entire time. 20x      Shoulder extension with elastic band    Hook elastic band behind door or heavy object.   Begin with arms bent in front of you holding band, and pull back your arms until elbows straighten and shoulders are back. 20x

## 2025-04-14 NOTE — PROGRESS NOTES
Outpatient Rehab    Physical Therapy Visit    Patient Name: Sean Main  MRN: [id number]  YOB: 1940  Encounter Date: 4/14/2025    Therapy Diagnosis:   Encounter Diagnoses   Name Primary?    Neck pain Yes    Decreased ROM of neck          Physician: Stevenson Garland MD    Physician Orders: Eval and Treat  Medical Diagnosis: Posterior neck pain    Visit # / Visits Authorized:  5 / 10  Insurance Authorization Period: 3/28/2025 to 12/31/2025  Date of Evaluation: 3/31/25  Plan of Care Certification: 3/31/25 to 6/23/25     PT/PTA:     Number of PTA visits since last PT visit:   Time In: 0955   Time Out: 1055  Total Time: 60   Total Billable Time: 3855    FOTO:  Intake Score: 28%  Survey Score 1:  %  Survey Score 2:  %         Subjective   Shoulder pain is much worse this morning. Neck pain is improving. He awakened with severe shoulder pain this morning..  Pain reported as 10/10. shoulder pain- bilateral    Objective            Treatment:  Therapeutic Exercise  TE 1: UBE retro 10 min  TE 2: cervical sidebending stretch with light overpressure 3x 10 sec ea  TE 3: levator scapulae stretch with light overpressure 3x 10 sec ea  TE 4: cervical rotation with light overpressure 3x 10 sec ea  TE 5: wand ex flexion x10  TE 6: wand ex ER x10  TE 7: pulley stretch flexion 3 min  Manual Therapy  MT 1: manual mob/stretching into sidebending, rotation and levator scap stretch positions  Balance/Neuromuscular Re-Education  NMR 1: cervical retraction 5 sec x10  NMR 2: cervical retraction with extension x10  NMR 3: thoracic extension over chair x10  NMR 4: scapular retraction x10  NMR 5: open book x10 ea  NMR 6: Breugger's RTB x20  NMR 7: GTB rows x20  NMR 8: GTB shoulder extension x20  NMR 9: GTB ER x20 ea    Time Entry(in minutes):  Neuromuscular Re-Education Time Entry: 25  Therapeutic Exercise Time Entry: 13    Assessment & Plan   Assessment: Pt reporting severe bilateral shoulder pain on awakening this  morning. He is still in severe pain bilateral shoulders even at rest. Pain significantly improved following exercise and activities above to increase mobility of shoulders. Discussed importance of daily stretching of his neck and shoulders. hE consistently feels better after each session. Pt given handout for additional home exercises for his shoulders.       Patient will continue to benefit from skilled outpatient physical therapy to address the deficits listed in the problem list box on initial evaluation, provide pt/family education and to maximize pt's level of independence in the home and community environment.     Patient's spiritual, cultural, and educational needs considered and patient agreeable to plan of care and goals.     Education             Patient  was instructed in home exercise program  to address the deficits listed above and to address overall condition and quality of life.  - Patient was educated on all the above exercise prior/during/after for proper posture, positioning, and execution for safe performance with home exercise program.           Plan: Cont per POC progressing toward established goals.    Goals:   Active       A. Short term goals       - Pt will demonstratte increased cervical ROM by 5 degrees which results in improved  ROM of neck for ease with ADLs and driving. Appropriate and Ongoing        Start:  03/31/25    Expected End:  05/12/25            - Pt will demonstrate independence with reducing or controlling symptoms with ther ex, movement, or position independently, able to reduce pain 1-2 points on pain scale using strategies taught in therapy.  Appropriate and Ongoing        Start:  03/31/25    Expected End:  05/12/25            - Pt will demonstrate increased strength of scapulo-thoracic musculature by 1/3 grade resulting in improved ability to perform bending, lifting, and carrying activities safely and confidently. Appropriate and Ongoing        Start:  03/31/25     Expected End:  05/12/25               B. Long term goals       - Pt will demonstratte increased cervical ROM by 10 degrees from initial test which results in functional ROM of neck for ease with ADLs and driving.  Appropriate and Ongoing        Start:  03/31/25    Expected End:  06/23/25            - Pt will demonstrate reduced pain and improved functional outcomes as reported on the FOTO by reaching an improved intake score by >/= 6% functional ability in order to demonstrate subjective improvement in patient's condition.        Start:  03/31/25    Expected End:  06/23/25            - Pt will demonstrate independence with reducing or controlling symptoms with ther ex, movement, or position independently, able to reduce pain 2-4 points on pain scale using strategies taught in therapy. Appropriate and Ongoing        Start:  03/31/25    Expected End:  06/23/25            - Pt will demonstrate independence with the HEP at discharge. Appropriate and Ongoing        Start:  03/31/25    Expected End:  06/23/25                Chel Cook PT

## 2025-04-16 ENCOUNTER — CLINICAL SUPPORT (OUTPATIENT)
Dept: REHABILITATION | Facility: HOSPITAL | Age: 85
End: 2025-04-16
Payer: MEDICARE

## 2025-04-16 DIAGNOSIS — M54.2 NECK PAIN: Primary | ICD-10-CM

## 2025-04-16 DIAGNOSIS — R29.898 DECREASED ROM OF NECK: ICD-10-CM

## 2025-04-16 PROCEDURE — 97110 THERAPEUTIC EXERCISES: CPT | Mod: PO | Performed by: PHYSICAL THERAPIST

## 2025-04-16 PROCEDURE — 97140 MANUAL THERAPY 1/> REGIONS: CPT | Mod: PO | Performed by: PHYSICAL THERAPIST

## 2025-04-16 PROCEDURE — 97112 NEUROMUSCULAR REEDUCATION: CPT | Mod: PO | Performed by: PHYSICAL THERAPIST

## 2025-04-16 NOTE — PROGRESS NOTES
Outpatient Rehab    Physical Therapy Visit    Patient Name: Sean Main  MRN: [id number]  YOB: 1940  Encounter Date: 4/16/2025    Therapy Diagnosis:   Encounter Diagnoses   Name Primary?    Neck pain Yes    Decreased ROM of neck            Physician: Stevenson Garland MD    Physician Orders: Eval and Treat  Medical Diagnosis: Posterior neck pain    Visit # / Visits Authorized:  6 / 10  Insurance Authorization Period: 3/28/2025 to 12/31/2025  Date of Evaluation: 3/31/25  Plan of Care Certification: 3/31/25 to 6/23/25     PT/PTA:     Number of PTA visits since last PT visit:   Time In: 0955   Time Out: 1055  Total Time: 60   Total Billable Time: 6055    FOTO:  Intake Score: 28%  Survey Score 1: 12%  Survey Score 2:  %         Subjective   Shoulder pain is better than last visit, rating it as 6/10. Neck pain is significantly better- 0/10 unless at end range of rotation 4/10..  Pain reported as 4/10. shoulder pain- bilateral, cervical pain    Objective        Range of Motion - MOVEMENT LOSS     ROM Loss   Flexion WNL   Extension minimal loss, decreased pain   Side bending Right moderate loss   Side bending Left moderate loss   Rotation Right moderate loss, pain at end range   Rotation Left moderate loss, pain at end range   Protraction minimal loss   Retraction  Minimal loss         Active Range of Motion:   Shoulder Left Right   Flexion 160 160   Abduction 160 160   ER at 90 70 70   IR 65 70     Upper Extremity Strength   (L) UE (R) UE   Shoulder flexion: 4+/5 4+/5   Shoulder Abduction: 4/5 4+/5   Shoulder ER 4/5 4+/5   Shoulder IR 5/5 5/5   Lower Trap 4/5 4/5   Middle Trap 4/5 4/5   Rhomboids 4/5 4/5               Treatment:  Therapeutic Exercise  TE 1: UBE retro 10 min  TE 2: cervical sidebending stretch with light overpressure 3x 10 sec ea  TE 3: levator scapulae stretch with light overpressure 3x 10 sec ea  TE 4: cervical rotation with light overpressure 3x 10 sec ea  TE 5: wand ex flexion  x10  TE 6: wand ex ER x10  TE 7: pulley stretch flexion 3 min  Manual Therapy  MT 1: manual mob/stretching into sidebending, rotation and levator scap stretch positions  Balance/Neuromuscular Re-Education  NMR 1: cervical retraction 5 sec x10  NMR 2: cervical retraction with extension x10  NMR 3: thoracic extension over chair x10  NMR 4: scapular retraction x10  NMR 5: open book x10 ea  NMR 6: Breugger's RTB x20  NMR 7: GTB rows x20  NMR 8: GTB shoulder extension x20  NMR 9: GTB ER x20 ea    Time Entry(in minutes):  Manual Therapy Time Entry: 15  Neuromuscular Re-Education Time Entry: 30  Therapeutic Exercise Time Entry: 15    Assessment & Plan   Assessment: Pt reports significant improvement in cervical pain. He still has pain at end ranges of movement, especially right rotation. He has significant rigidity of movement likely secondary to arthritic changes. He reports decreased pain following stretching at home as instructed. He continues with bilateral shoulder pain that improves with movement as well. He has some limitation of ROM of his shoulders that improves with exercise as well. His greatest pain is in morning when he wakes up and it improves after stretching. 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 Reephasized importance of daily stretching and maintaining his mobility. Pt plans to discuss with his physician before continuing with PT at this time.       Patient will continue to benefit from skilled outpatient physical therapy to address the deficits listed in the problem list box on initial evaluation, provide pt/family education and to maximize pt's level of independence in the home and community environment.     Patient's spiritual, cultural, and educational needs considered and patient agreeable to plan of care and goals.     Education             Patient  was instructed in home exercise program  to address the deficits listed above and to address overall  condition and quality of life.  - Patient was educated on all the above exercise prior/during/after for proper posture, positioning, and execution for safe performance with home exercise program.             Plan: Cont per POC progressing toward established goals.    Goals:   Active       A. Short term goals       - Pt will demonstratte increased cervical ROM by 5 degrees which results in improved  ROM of neck for ease with ADLs and driving. Appropriate and Ongoing        Start:  03/31/25    Expected End:  05/12/25            - Pt will demonstrate independence with reducing or controlling symptoms with ther ex, movement, or position independently, able to reduce pain 1-2 points on pain scale using strategies taught in therapy.  Appropriate and Ongoing        Start:  03/31/25    Expected End:  05/12/25            - Pt will demonstrate increased strength of scapulo-thoracic musculature by 1/3 grade resulting in improved ability to perform bending, lifting, and carrying activities safely and confidently. Appropriate and Ongoing        Start:  03/31/25    Expected End:  05/12/25               B. Long term goals       - Pt will demonstratte increased cervical ROM by 10 degrees from initial test which results in functional ROM of neck for ease with ADLs and driving.  Appropriate and Ongoing        Start:  03/31/25    Expected End:  06/23/25            - Pt will demonstrate reduced pain and improved functional outcomes as reported on the FOTO by reaching an improved intake score by >/= 6% functional ability in order to demonstrate subjective improvement in patient's condition.        Start:  03/31/25    Expected End:  06/23/25            - Pt will demonstrate independence with reducing or controlling symptoms with ther ex, movement, or position independently, able to reduce pain 2-4 points on pain scale using strategies taught in therapy. Appropriate and Ongoing        Start:  03/31/25    Expected End:  06/23/25             - Pt will demonstrate independence with the HEP at discharge. Appropriate and Ongoing        Start:  03/31/25    Expected End:  06/23/25                Chel Cook PT

## 2025-04-17 ENCOUNTER — RESULTS FOLLOW-UP (OUTPATIENT)
Dept: FAMILY MEDICINE | Facility: CLINIC | Age: 85
End: 2025-04-17

## 2025-04-17 ENCOUNTER — HOSPITAL ENCOUNTER (OUTPATIENT)
Dept: RADIOLOGY | Facility: HOSPITAL | Age: 85
Discharge: HOME | End: 2025-04-17
Attending: NURSE PRACTITIONER
Payer: MEDICARE

## 2025-04-17 ENCOUNTER — OFFICE VISIT (OUTPATIENT)
Dept: FAMILY MEDICINE | Facility: CLINIC | Age: 85
End: 2025-04-17
Payer: MEDICARE

## 2025-04-17 ENCOUNTER — TELEPHONE (OUTPATIENT)
Dept: FAMILY MEDICINE | Facility: CLINIC | Age: 85
End: 2025-04-17

## 2025-04-17 VITALS
OXYGEN SATURATION: 98 % | TEMPERATURE: 98 F | HEIGHT: 69 IN | DIASTOLIC BLOOD PRESSURE: 80 MMHG | HEART RATE: 50 BPM | BODY MASS INDEX: 22.79 KG/M2 | SYSTOLIC BLOOD PRESSURE: 122 MMHG | WEIGHT: 153.88 LBS

## 2025-04-17 DIAGNOSIS — M25.512 CHRONIC PAIN OF BOTH SHOULDERS: ICD-10-CM

## 2025-04-17 DIAGNOSIS — M25.50 ARTHRALGIA, UNSPECIFIED JOINT: ICD-10-CM

## 2025-04-17 DIAGNOSIS — M54.16 LUMBAR RADICULOPATHY: ICD-10-CM

## 2025-04-17 DIAGNOSIS — M51.362 DEGENERATION OF INTERVERTEBRAL DISC OF LUMBAR REGION WITH DISCOGENIC BACK PAIN AND LOWER EXTREMITY PAIN: ICD-10-CM

## 2025-04-17 DIAGNOSIS — M25.511 CHRONIC PAIN OF BOTH SHOULDERS: Primary | ICD-10-CM

## 2025-04-17 DIAGNOSIS — M25.511 CHRONIC PAIN OF BOTH SHOULDERS: ICD-10-CM

## 2025-04-17 DIAGNOSIS — M25.512 CHRONIC PAIN OF BOTH SHOULDERS: Primary | ICD-10-CM

## 2025-04-17 DIAGNOSIS — G89.29 CHRONIC PAIN OF BOTH SHOULDERS: ICD-10-CM

## 2025-04-17 DIAGNOSIS — G89.29 CHRONIC PAIN OF BOTH SHOULDERS: Primary | ICD-10-CM

## 2025-04-17 PROCEDURE — 73030 X-RAY EXAM OF SHOULDER: CPT | Mod: TC,50,FY,PO

## 2025-04-17 PROCEDURE — 73030 X-RAY EXAM OF SHOULDER: CPT | Mod: 26,50,, | Performed by: RADIOLOGY

## 2025-04-17 PROCEDURE — 99213 OFFICE O/P EST LOW 20 MIN: CPT | Mod: S$PBB,,, | Performed by: NURSE PRACTITIONER

## 2025-04-17 PROCEDURE — 99214 OFFICE O/P EST MOD 30 MIN: CPT | Mod: PBBFAC,25,PO | Performed by: NURSE PRACTITIONER

## 2025-04-17 PROCEDURE — 99999 PR PBB SHADOW E&M-EST. PATIENT-LVL IV: CPT | Mod: PBBFAC,,, | Performed by: NURSE PRACTITIONER

## 2025-04-17 RX ORDER — TIZANIDINE HYDROCHLORIDE 4 MG/1
CAPSULE, GELATIN COATED ORAL
COMMUNITY

## 2025-04-17 NOTE — TELEPHONE ENCOUNTER
----- Message from Iris Bowling NP sent at 4/17/2025  4:00 PM CDT -----  Shoulder xray shows multiple degenerative changes to both shoulders. Follow up with orthopedics as scheduled on 4/22/25.  ----- Message -----  From: Interface, Rad Results In  Sent: 4/17/2025   2:42 PM CDT  To: Iris Bowling NP    
Spoke with pt and informed them of information per Iris Bowling NP.  
Single

## 2025-04-17 NOTE — PROGRESS NOTES
Subjective:       Patient ID: Sean Main is a 84 y.o. male.    Chief Complaint: Follow-up, Shoulder Pain (Patient stated he's been a extremely amount of pain in his neck and shoulders ), Leg Pain, and Muscle Pain    HPI follow up for neck and shoulder pain.  Still having what feels like muscle pain to neck, both shoulders and legs. Has completed 6 sessions of PT for neck. Reports neck pain has improved but is still having bilateral shoulder pain rated 9/10. Shoulder pain is worse in the morning and improves throughout the day. Pt states he has to sleep on his side to help the neck but it worsens the shoulder pain. Reports history of shoulder injury in his 20s. Pt states he can hear popping in the shoulder. Takes tizanidine once daily which is somewhat helpful. Pt states he has always had intermittent pain that would rotate from shoulder to shoulder but pain has been progressively worse over past 6 months and limiting activity. Also reports muscle weakness to legs has been progressively worse and no longer able to go on his daily walks. MRI L spine in 2023 showed degenerative changes and nerve impingement.  Past Medical History:   Diagnosis Date    CAD (coronary artery disease)     Compression fracture of T12 vertebra     DDD (degenerative disc disease), lumbar     Diverticulosis     GERD (gastroesophageal reflux disease)     Hemorrhoid     Hernia of unspecified site of abdominal cavity without mention of obstruction or gangrene     hiatal hernia    HLD (hyperlipidemia)     HTN (hypertension)        Past Surgical History:   Procedure Laterality Date    BASAL CELL CARCINOMA EXCISION  08/01/2016    left nasal    CHOLECYSTECTOMY      COLONOSCOPY  2008    diverticulosis    CORONARY ARTERY BYPASS GRAFT  4/4/2012    6 vessels    LIVER BIOPSY      UPPER GASTROINTESTINAL ENDOSCOPY  2011    hiatal hernia       Review of patient's allergies indicates:   Allergen Reactions    Lipitor [atorvastatin]      Myalgia       "Pravastatin      myalgias       Social History[1]    Medications Ordered Prior to Encounter[2]    Family History   Problem Relation Name Age of Onset    Cirrhosis Neg Hx      Liver cancer Neg Hx      Liver disease Neg Hx      Colon cancer Neg Hx         Review of Systems   Musculoskeletal:  Positive for arthralgias, back pain, gait problem, myalgias and neck pain.   Neurological:  Positive for weakness.       Objective:      /80 (Patient Position: Sitting)   Pulse (!) 50   Temp 97.6 °F (36.4 °C) (Oral)   Ht 5' 9" (1.753 m)   Wt 69.8 kg (153 lb 14.1 oz)   SpO2 98%   BMI 22.72 kg/m²   Physical Exam  Vitals and nursing note reviewed.   Constitutional:       General: He is not in acute distress.     Appearance: Normal appearance. He is well-groomed.   HENT:      Head: Normocephalic.      Right Ear: Tympanic membrane, ear canal and external ear normal.      Left Ear: Tympanic membrane, ear canal and external ear normal.      Nose: Nose normal.      Mouth/Throat:      Lips: Pink.      Mouth: Mucous membranes are moist.      Pharynx: Oropharynx is clear.   Eyes:      Extraocular Movements: Extraocular movements intact.      Conjunctiva/sclera: Conjunctivae normal.      Pupils: Pupils are equal, round, and reactive to light.   Neck:      Comments: Limited cervical rotation  Cardiovascular:      Rate and Rhythm: Normal rate and regular rhythm.      Heart sounds: Normal heart sounds. No murmur heard.  Pulmonary:      Effort: Pulmonary effort is normal.      Breath sounds: Normal breath sounds.   Chest:      Chest wall: No tenderness.   Abdominal:      General: Bowel sounds are normal.      Palpations: Abdomen is soft.      Tenderness: There is no abdominal tenderness.   Musculoskeletal:         General: No swelling or tenderness. Normal range of motion.      Cervical back: Normal range of motion and neck supple.      Right lower leg: No edema.      Left lower leg: No edema.      Comments: Limited rotation, " flexion, and abduction of bilateral shoulders   Lymphadenopathy:      Cervical: No cervical adenopathy.   Skin:     General: Skin is warm and dry.   Neurological:      General: No focal deficit present.      Mental Status: He is alert and oriented to person, place, and time. Mental status is at baseline.      Gait: Gait abnormal (antalgic gait).   Psychiatric:         Mood and Affect: Mood normal.         Behavior: Behavior normal.         Assessment:       1. Chronic pain of both shoulders    2. Arthralgia, unspecified joint    3. Lumbar radiculopathy    4. Degeneration of intervertebral disc of lumbar region with discogenic back pain and lower extremity pain        Plan:       Chronic pain of both shoulders  -     X-Ray Shoulder Complete Bilateral; Future; Expected date: 04/17/2025  -     Ambulatory referral/consult to Orthopedics; Future; Expected date: 04/24/2025    Arthralgia, unspecified joint  -     C-Reactive Protein; Future; Expected date: 04/17/2025  -     Sedimentation rate; Future; Expected date: 04/17/2025  -     JAMSE IFA Screen With Reflex To Titer and Pattern; Future; Expected date: 04/17/2025    Lumbar radiculopathy    Degeneration of intervertebral disc of lumbar region with discogenic back pain and lower extremity pain          JAMES, sed rate, CRP    Xray shoulders ordered.     Refer to ortho    Tylenol arthritis prn pain.    Counseled on regular exercise, maintenance of a healthy weight, balanced diet rich in fruits/vegetables and lean protein, and avoidance of unhealthy habits like smoking and excessive alcohol intake.         [1]   Social History  Socioeconomic History    Marital status:    Tobacco Use    Smoking status: Never    Smokeless tobacco: Never   Substance and Sexual Activity    Alcohol use: No    Drug use: No     Social Drivers of Health     Financial Resource Strain: Low Risk  (12/14/2023)    Overall Financial Resource Strain (CARDIA)     Difficulty of Paying Living Expenses:  Not hard at all   Food Insecurity: No Food Insecurity (12/14/2023)    Hunger Vital Sign     Worried About Running Out of Food in the Last Year: Never true     Ran Out of Food in the Last Year: Never true   Transportation Needs: No Transportation Needs (12/14/2023)    PRAPARE - Transportation     Lack of Transportation (Medical): No     Lack of Transportation (Non-Medical): No   Physical Activity: Insufficiently Active (12/14/2023)    Exercise Vital Sign     Days of Exercise per Week: 3 days     Minutes of Exercise per Session: 40 min   Stress: No Stress Concern Present (12/14/2023)    [employer] [place] [employer] - Occupational Stress Questionnaire     Feeling of Stress : Not at all   Housing Stability: Unknown (12/14/2023)    Housing Stability Vital Sign     Unable to Pay for Housing in the Last Year: No     Number of Places Lived in the Last Year: 1   [2]   Current Outpatient Medications on File Prior to Visit   Medication Sig Dispense Refill    amLODIPine (NORVASC) 5 MG tablet Take 1 tablet by mouth in the evening 90 tablet 3    aspirin (ECOTRIN) 81 MG EC tablet Take 81 mg by mouth once daily.      atorvastatin (LIPITOR) 10 MG tablet Take 1/2 (one-half) tablet by mouth once daily 45 tablet 3    carvediloL (COREG) 3.125 MG tablet TAKE 1/2 (ONE-HALF) TABLET BY MOUTH TWICE DAILY WITH FOOD 90 tablet 3    ezetimibe (ZETIA) 10 mg tablet Take 1 tablet (10 mg total) by mouth once daily. 90 tablet 3    multivitamin,tx-iron-minerals Tab Take 1 tablet by mouth once daily.      omega-3 fatty acids/fish oil (FISH OIL-OMEGA-3 FATTY ACIDS) 300-1,000 mg capsule Take 1 capsule by mouth once daily.      pantoprazole (PROTONIX) 40 MG tablet Take 1 tablet by mouth once daily 90 tablet 1    saw palmetto 500 MG capsule Take 500 mg by mouth 2 (two) times daily.      tiZANidine 4 mg Cap Take by mouth.       No current facility-administered medications on file prior to visit.

## 2025-04-22 ENCOUNTER — OFFICE VISIT (OUTPATIENT)
Dept: ORTHOPEDICS | Facility: CLINIC | Age: 85
End: 2025-04-22
Payer: MEDICARE

## 2025-04-22 DIAGNOSIS — M19.011 PRIMARY OSTEOARTHRITIS OF BOTH SHOULDERS: Primary | ICD-10-CM

## 2025-04-22 DIAGNOSIS — M19.012 PRIMARY OSTEOARTHRITIS OF BOTH SHOULDERS: Primary | ICD-10-CM

## 2025-04-22 DIAGNOSIS — M25.511 CHRONIC PAIN OF BOTH SHOULDERS: ICD-10-CM

## 2025-04-22 DIAGNOSIS — M25.512 CHRONIC PAIN OF BOTH SHOULDERS: ICD-10-CM

## 2025-04-22 DIAGNOSIS — G89.29 CHRONIC PAIN OF BOTH SHOULDERS: ICD-10-CM

## 2025-04-22 PROCEDURE — 99213 OFFICE O/P EST LOW 20 MIN: CPT | Mod: PBBFAC,PO,25 | Performed by: PHYSICIAN ASSISTANT

## 2025-04-22 PROCEDURE — 99999 PR PBB SHADOW E&M-EST. PATIENT-LVL III: CPT | Mod: PBBFAC,,, | Performed by: PHYSICIAN ASSISTANT

## 2025-04-22 PROCEDURE — 99999PBSHW PR PBB SHADOW TECHNICAL ONLY FILED TO HB: Mod: PBBFAC,,,

## 2025-04-22 PROCEDURE — 20610 DRAIN/INJ JOINT/BURSA W/O US: CPT | Mod: 50,PBBFAC,PO | Performed by: PHYSICIAN ASSISTANT

## 2025-04-22 RX ORDER — LIDOCAINE HYDROCHLORIDE 10 MG/ML
2 INJECTION, SOLUTION INFILTRATION; PERINEURAL
Status: DISCONTINUED | OUTPATIENT
Start: 2025-04-22 | End: 2025-04-22 | Stop reason: HOSPADM

## 2025-04-22 RX ORDER — TRIAMCINOLONE ACETONIDE 40 MG/ML
40 INJECTION, SUSPENSION INTRA-ARTICULAR; INTRAMUSCULAR
Status: DISCONTINUED | OUTPATIENT
Start: 2025-04-22 | End: 2025-04-22 | Stop reason: HOSPADM

## 2025-04-22 RX ADMIN — LIDOCAINE HYDROCHLORIDE 2 ML: 10 INJECTION, SOLUTION INFILTRATION; PERINEURAL at 09:04

## 2025-04-22 RX ADMIN — TRIAMCINOLONE ACETONIDE 40 MG: 40 INJECTION, SUSPENSION INTRA-ARTICULAR; INTRAMUSCULAR at 09:04

## 2025-04-22 NOTE — PROGRESS NOTES
SUBJECTIVE:     Chief Complaint & History of Present Illness:  Sean Main is a 84 y.o. year old male who presents today with constant bilateral shoulder pain for the past 6 months.  He is Right hand dominant.  There was not a recent injury.  The pain is located in the  lateral and  upper arm aspect of the shoulder.  The pain is described as achy.  It is aggravated by activity.  His symptoms have progressively worsened over the past 3-4 months..  Associated symptoms include weakness.  Previous treatments include rest and zanaflex.  He has tried PT for his neck and shoulders without much improvement.  There is not a history of previous injury or surgery to the shoulder.      Review of patient's allergies indicates:   Allergen Reactions    Lipitor [atorvastatin]      Myalgia      Pravastatin      myalgias         Current Medications[1]    Past Medical History:   Diagnosis Date    CAD (coronary artery disease)     Compression fracture of T12 vertebra     DDD (degenerative disc disease), lumbar     Diverticulosis     GERD (gastroesophageal reflux disease)     Hemorrhoid     Hernia of unspecified site of abdominal cavity without mention of obstruction or gangrene     hiatal hernia    HLD (hyperlipidemia)     HTN (hypertension)        Past Surgical History:   Procedure Laterality Date    BASAL CELL CARCINOMA EXCISION  08/01/2016    left nasal    CHOLECYSTECTOMY      COLONOSCOPY  2008    diverticulosis    CORONARY ARTERY BYPASS GRAFT  4/4/2012    6 vessels    LIVER BIOPSY      UPPER GASTROINTESTINAL ENDOSCOPY  2011    hiatal hernia       OBJECTIVE:     PHYSICAL EXAM:    General:   There is no height or weight on file to calculate BMI.  Patient is alert, awake and oriented to time, place and person. Mood and affect are appropriate.  Patient does not appear to be in any distress, denies any constitutional symptoms and appears stated age.   HEENT: Pupils are equal and round, sclera are not injected. External  examination of ears and nose reveals no abnormalities. Cranial nerves II-X are grossly intact  Skin: no rashes, abrasions or open wounds on the affected extremity   Resp: No respiratory distress or audible wheezing   Psych:  normal mood and behavior  CV: 2+  pulses, all extremities warm and well perfused   Left and Right Shoulder     Tenderness: deltoid muscle  Range of motion is painful   ROM: forward flexion 150, external rotation 40, internal rotation SI joint  Shoulder Strength: biceps 5/5, triceps 5/5, abduction 5/5, adduction 5/5  positive for impingement sign, sensory exam normal, and motor exam normal  Stability tests: normal  Special Tests:    Crossbody test: positive    Neer's positive  Hawkin's positive    Camila's positive  Drop arm negative    IMAGING:  X-rays of the bilateral shoulder, personally reviewed by me, demonstrate mild degenerative changes with subtle osteophyte formation right shoulder.  Left shoulder has mild degenerative changes.  No fracture or dislocation.     ASSESSMENT     1. Primary osteoarthritis of both shoulders    2. Chronic pain of both shoulders        PLAN:     Discussed with the patient at length all the different treatment options available for his bilateral shoulder including anti-inflammatories, acetaminophen, rest, ice, Physical therapy, occasional cortisone injections for temporary relief    - Clinical and x-ray findings disucssed  - Bilateral shoulder CSI performed today  - He may return for repeat injections if helpful  - Ok to take tylenol as needed  - Follow up if symptoms worsen or fail to improve         [1]   Current Outpatient Medications   Medication Sig Dispense Refill    amLODIPine (NORVASC) 5 MG tablet Take 1 tablet by mouth in the evening 90 tablet 3    aspirin (ECOTRIN) 81 MG EC tablet Take 81 mg by mouth once daily.      atorvastatin (LIPITOR) 10 MG tablet Take 1/2 (one-half) tablet by mouth once daily 45 tablet 3    carvediloL (COREG) 3.125 MG tablet TAKE  1/2 (ONE-HALF) TABLET BY MOUTH TWICE DAILY WITH FOOD 90 tablet 3    ezetimibe (ZETIA) 10 mg tablet Take 1 tablet (10 mg total) by mouth once daily. 90 tablet 3    multivitamin,tx-iron-minerals Tab Take 1 tablet by mouth once daily.      omega-3 fatty acids/fish oil (FISH OIL-OMEGA-3 FATTY ACIDS) 300-1,000 mg capsule Take 1 capsule by mouth once daily.      pantoprazole (PROTONIX) 40 MG tablet Take 1 tablet by mouth once daily 90 tablet 1    saw palmetto 500 MG capsule Take 500 mg by mouth 2 (two) times daily.      tiZANidine 4 mg Cap Take by mouth.       No current facility-administered medications for this visit.

## 2025-04-22 NOTE — PROCEDURES
Large Joint Aspiration/Injection: bilateral subacromial bursa    Date/Time: 4/22/2025 9:00 AM    Performed by: Poornima Ferrera PA-C  Authorized by: Poornima Ferrera PA-C    Consent Done?:  Yes (Verbal)  Indications:  Pain  Timeout: prior to procedure the correct patient, procedure, and site was verified    Prep: patient was prepped and draped in usual sterile fashion    Local anesthetic:  Topical anesthetic    Details:  Needle Size:  22 G  Approach:  Posterior  Location:  Shoulder  Laterality:  Bilateral  Site:  Bilateral subacromial bursa  Medications (Right):  40 mg triamcinolone acetonide 40 mg/mL; 2 mL LIDOcaine HCL 10 mg/ml (1%) 10 mg/mL (1 %)  Medications (Left):  40 mg triamcinolone acetonide 40 mg/mL; 2 mL LIDOcaine HCL 10 mg/ml (1%) 10 mg/mL (1 %)  Patient tolerance:  Patient tolerated the procedure well with no immediate complications

## 2025-04-30 ENCOUNTER — EXTERNAL CHRONIC CARE MANAGEMENT (OUTPATIENT)
Dept: PRIMARY CARE CLINIC | Facility: CLINIC | Age: 85
End: 2025-04-30
Payer: MEDICARE

## 2025-04-30 PROCEDURE — 99490 CHRNC CARE MGMT STAFF 1ST 20: CPT | Mod: PBBFAC,PO | Performed by: FAMILY MEDICINE

## 2025-04-30 PROCEDURE — 99439 CHRNC CARE MGMT STAF EA ADDL: CPT | Mod: PBBFAC,PO | Performed by: FAMILY MEDICINE

## 2025-05-31 ENCOUNTER — EXTERNAL CHRONIC CARE MANAGEMENT (OUTPATIENT)
Dept: PRIMARY CARE CLINIC | Facility: CLINIC | Age: 85
End: 2025-05-31
Payer: MEDICARE

## 2025-05-31 PROCEDURE — 99490 CHRNC CARE MGMT STAFF 1ST 20: CPT | Mod: S$PBB,,, | Performed by: FAMILY MEDICINE

## 2025-05-31 PROCEDURE — 99490 CHRNC CARE MGMT STAFF 1ST 20: CPT | Mod: PBBFAC,PO | Performed by: FAMILY MEDICINE

## 2025-06-12 ENCOUNTER — OFFICE VISIT (OUTPATIENT)
Dept: ORTHOPEDICS | Facility: CLINIC | Age: 85
End: 2025-06-12
Payer: MEDICARE

## 2025-06-12 ENCOUNTER — HOSPITAL ENCOUNTER (OUTPATIENT)
Dept: RADIOLOGY | Facility: HOSPITAL | Age: 85
Discharge: HOME | End: 2025-06-12
Attending: PHYSICIAN ASSISTANT
Payer: MEDICARE

## 2025-06-12 DIAGNOSIS — M19.012 PRIMARY OSTEOARTHRITIS OF BOTH SHOULDERS: Primary | ICD-10-CM

## 2025-06-12 DIAGNOSIS — M25.569 KNEE PAIN, UNSPECIFIED CHRONICITY, UNSPECIFIED LATERALITY: Primary | ICD-10-CM

## 2025-06-12 DIAGNOSIS — M25.569 KNEE PAIN, UNSPECIFIED CHRONICITY, UNSPECIFIED LATERALITY: ICD-10-CM

## 2025-06-12 DIAGNOSIS — G89.29 CHRONIC PAIN OF BOTH SHOULDERS: ICD-10-CM

## 2025-06-12 DIAGNOSIS — M25.511 CHRONIC PAIN OF BOTH SHOULDERS: ICD-10-CM

## 2025-06-12 DIAGNOSIS — M47.12 CERVICAL SPONDYLOSIS WITH MYELOPATHY: ICD-10-CM

## 2025-06-12 DIAGNOSIS — M19.011 PRIMARY OSTEOARTHRITIS OF BOTH SHOULDERS: Primary | ICD-10-CM

## 2025-06-12 DIAGNOSIS — M48.062 SPINAL STENOSIS OF LUMBAR REGION WITH NEUROGENIC CLAUDICATION: ICD-10-CM

## 2025-06-12 DIAGNOSIS — M21.372 FOOT DROP, BILATERAL: ICD-10-CM

## 2025-06-12 DIAGNOSIS — M25.512 CHRONIC PAIN OF BOTH SHOULDERS: ICD-10-CM

## 2025-06-12 DIAGNOSIS — M21.371 FOOT DROP, BILATERAL: ICD-10-CM

## 2025-06-12 PROCEDURE — 99999PBSHW PR PBB SHADOW TECHNICAL ONLY FILED TO HB: Mod: PBBFAC,,,

## 2025-06-12 PROCEDURE — 20610 DRAIN/INJ JOINT/BURSA W/O US: CPT | Mod: 50,PBBFAC,PO | Performed by: PHYSICIAN ASSISTANT

## 2025-06-12 PROCEDURE — 99999 PR PBB SHADOW E&M-EST. PATIENT-LVL III: CPT | Mod: PBBFAC,,, | Performed by: PHYSICIAN ASSISTANT

## 2025-06-12 PROCEDURE — 73562 X-RAY EXAM OF KNEE 3: CPT | Mod: TC,50,PO

## 2025-06-12 PROCEDURE — 99213 OFFICE O/P EST LOW 20 MIN: CPT | Mod: PBBFAC,25,PO | Performed by: PHYSICIAN ASSISTANT

## 2025-06-12 PROCEDURE — 73562 X-RAY EXAM OF KNEE 3: CPT | Mod: 26,50,, | Performed by: RADIOLOGY

## 2025-06-12 RX ORDER — LIDOCAINE HYDROCHLORIDE 10 MG/ML
2 INJECTION, SOLUTION INFILTRATION; PERINEURAL
Status: DISCONTINUED | OUTPATIENT
Start: 2025-06-12 | End: 2025-06-12 | Stop reason: HOSPADM

## 2025-06-12 RX ORDER — TRIAMCINOLONE ACETONIDE 40 MG/ML
40 INJECTION, SUSPENSION INTRA-ARTICULAR; INTRAMUSCULAR
Status: DISCONTINUED | OUTPATIENT
Start: 2025-06-12 | End: 2025-06-12 | Stop reason: HOSPADM

## 2025-06-12 RX ADMIN — TRIAMCINOLONE ACETONIDE 40 MG: 40 INJECTION, SUSPENSION INTRA-ARTICULAR; INTRAMUSCULAR at 11:06

## 2025-06-12 RX ADMIN — LIDOCAINE HYDROCHLORIDE 2 ML: 10 INJECTION, SOLUTION INFILTRATION; PERINEURAL at 11:06

## 2025-06-12 NOTE — PROCEDURES
Large Joint Aspiration/Injection: bilateral subacromial bursa    Date/Time: 6/12/2025 11:20 AM    Performed by: Poornima Ferrera PA-C  Authorized by: Poornima Ferrera PA-C    Consent Done?:  Yes (Verbal)  Indications:  Pain  Timeout: prior to procedure the correct patient, procedure, and site was verified    Prep: patient was prepped and draped in usual sterile fashion    Local anesthetic:  Topical anesthetic    Details:  Needle Size:  22 G  Approach:  Posterior  Location:  Shoulder  Laterality:  Bilateral  Site:  Bilateral subacromial bursa  Medications (Right):  2 mL LIDOcaine HCL 10 mg/ml (1%) 10 mg/mL (1 %); 40 mg triamcinolone acetonide 40 mg/mL  Medications (Left):  2 mL LIDOcaine HCL 10 mg/ml (1%) 10 mg/mL (1 %); 40 mg triamcinolone acetonide 40 mg/mL  Patient tolerance:  Patient tolerated the procedure well with no immediate complications

## 2025-06-12 NOTE — PROGRESS NOTES
SUBJECTIVE:     Chief Complaint & History of Present Illness:  Sean Main is a 84 y.o. year old male who presents today with constant bilateral shoulder pain for the past 6 months.  He is Right hand dominant.  There was not a recent injury.  The pain is located in the  lateral and  upper arm aspect of the shoulder.  The pain is described as achy.  It is aggravated by activity.  His symptoms have progressively worsened over the past 3-4 months..  Associated symptoms include weakness.  Previous treatments include rest and zanaflex.  He has tried PT for his neck and shoulders without much improvement.  There is not a history of previous injury or surgery to the shoulder.      6/12/2025  Here today for follow up of bilateral shoulder pain.  States the injections gave good relief but pain significantly worsened over past couple of weeks.   Also mentions worsening leg pain.  States he has increased difficulty walking due to pain in his legs and overall balance problems.  States he is not able to raise his feet when he walks, which has been present for at least the past couple of years.  He does endorse myelopathic symptoms such as difficulty with buttons/coins/keys. He has concerns for muscle wasting, ongoing weakness in his legs.  Endorses significant pain in shoulders, arms, legs.  He did mention to pcp a couple of months ago and had normal CRP, ESR.      Review of patient's allergies indicates:   Allergen Reactions    Lipitor [atorvastatin]      Myalgia      Pravastatin      myalgias         Current Medications[1]    Past Medical History:   Diagnosis Date    CAD (coronary artery disease)     Compression fracture of T12 vertebra     DDD (degenerative disc disease), lumbar     Diverticulosis     GERD (gastroesophageal reflux disease)     Hemorrhoid     Hernia of unspecified site of abdominal cavity without mention of obstruction or gangrene     hiatal hernia    HLD (hyperlipidemia)     HTN (hypertension)         Past Surgical History:   Procedure Laterality Date    BASAL CELL CARCINOMA EXCISION  08/01/2016    left nasal    CHOLECYSTECTOMY      COLONOSCOPY  2008    diverticulosis    CORONARY ARTERY BYPASS GRAFT  4/4/2012    6 vessels    LIVER BIOPSY      UPPER GASTROINTESTINAL ENDOSCOPY  2011    hiatal hernia       OBJECTIVE:     PHYSICAL EXAM:    General:   There is no height or weight on file to calculate BMI.  Patient is alert, awake and oriented to time, place and person. Mood and affect are appropriate.  Patient does not appear to be in any distress, denies any constitutional symptoms and appears stated age.   HEENT: Pupils are equal and round, sclera are not injected. External examination of ears and nose reveals no abnormalities. Cranial nerves II-X are grossly intact  Skin: no rashes, abrasions or open wounds on the affected extremity   Resp: No respiratory distress or audible wheezing   Psych:  normal mood and behavior  CV: 2+  pulses, all extremities warm and well perfused   Left and Right Shoulder     Tenderness: deltoid muscle  Range of motion is painful   ROM: forward flexion 150, external rotation 40, internal rotation SI joint  Shoulder Strength: biceps 5/5, triceps 5/5, abduction 5/5, adduction 5/5  positive for impingement sign, sensory exam normal, and motor exam normal  Stability tests: normal  Special Tests:    Crossbody test: positive    Neer's positive  Hawkin's positive    Camila's positive  Drop arm negative    Bilateral LE exam:  3+ bilateral LE reflexes  Sensation intact symmetrically  4/5 quad, 5/5 hamstring  No active dorsiflexion of ankle   FROM in knees, hips    IMAGING:  X-rays of the bilateral shoulder, personally reviewed by me, demonstrate mild degenerative changes with subtle osteophyte formation right shoulder.  Left shoulder has mild degenerative changes.  No fracture or dislocation.     X-rays of the bilateral knee, personally reviewed by me, demonstrate moderate degenerative changes  with joint space narrowing , osteophyte formation and subchondral sclerosis.  No fracture or dislocation.     ASSESSMENT     1. Primary osteoarthritis of both shoulders    2. Foot drop, bilateral    3. Cervical spondylosis with myelopathy    4. Spinal stenosis of lumbar region with neurogenic claudication    5. Chronic pain of both shoulders        PLAN:     - Clinical and x-ray findings disucssed  - Repeat bilateral shoulder CSI performed today.  Recommend wait 3 months for repeat injections if helpful again.  - Concerns regarding bilateral foot drop- unknown cause.  He was prescribed AFO braces- sent to [place].    - MRI cervical/lumbar/thoracic with plans for possible referral to neurosurgery.  Will call regarding results  - Ok to take tylenol as needed  - Follow up if symptoms worsen or fail to improve           [1]   Current Outpatient Medications   Medication Sig Dispense Refill    amLODIPine (NORVASC) 5 MG tablet Take 1 tablet by mouth in the evening 90 tablet 3    aspirin (ECOTRIN) 81 MG EC tablet Take 81 mg by mouth once daily.      atorvastatin (LIPITOR) 10 MG tablet Take 1/2 (one-half) tablet by mouth once daily 45 tablet 3    carvediloL (COREG) 3.125 MG tablet TAKE 1/2 (ONE-HALF) TABLET BY MOUTH TWICE DAILY WITH FOOD 90 tablet 3    ezetimibe (ZETIA) 10 mg tablet Take 1 tablet (10 mg total) by mouth once daily. 90 tablet 3    multivitamin,tx-iron-minerals Tab Take 1 tablet by mouth once daily.      omega-3 fatty acids/fish oil (FISH OIL-OMEGA-3 FATTY ACIDS) 300-1,000 mg capsule Take 1 capsule by mouth once daily.      pantoprazole (PROTONIX) 40 MG tablet Take 1 tablet by mouth once daily 90 tablet 1    saw palmetto 500 MG capsule Take 500 mg by mouth 2 (two) times daily.      tiZANidine 4 mg Cap Take by mouth.       No current facility-administered medications for this visit.

## 2025-06-26 ENCOUNTER — TELEPHONE (OUTPATIENT)
Dept: ORTHOPEDICS | Facility: CLINIC | Age: 85
End: 2025-06-26
Payer: MEDICARE

## 2025-06-26 ENCOUNTER — TELEPHONE (OUTPATIENT)
Dept: NEUROSURGERY | Facility: CLINIC | Age: 85
End: 2025-06-26
Payer: MEDICARE

## 2025-06-26 DIAGNOSIS — M48.062 SPINAL STENOSIS OF LUMBAR REGION WITH NEUROGENIC CLAUDICATION: Primary | ICD-10-CM

## 2025-06-26 NOTE — TELEPHONE ENCOUNTER
Called to schedule NP Neurosx appointment per referral.  No answer.  LVM.  Patient does have current imaging in chart.

## 2025-06-30 ENCOUNTER — EXTERNAL CHRONIC CARE MANAGEMENT (OUTPATIENT)
Dept: PRIMARY CARE CLINIC | Facility: CLINIC | Age: 85
End: 2025-06-30
Payer: MEDICARE

## 2025-06-30 PROCEDURE — 99490 CHRNC CARE MGMT STAFF 1ST 20: CPT | Mod: PBBFAC,PO | Performed by: FAMILY MEDICINE

## 2025-06-30 PROCEDURE — 99490 CHRNC CARE MGMT STAFF 1ST 20: CPT | Mod: S$PBB,,, | Performed by: FAMILY MEDICINE

## 2025-07-29 PROBLEM — M54.2 NECK PAIN: Status: RESOLVED | Noted: 2025-03-31 | Resolved: 2025-04-16

## 2025-07-29 PROBLEM — R29.898 DECREASED ROM OF NECK: Status: RESOLVED | Noted: 2025-03-31 | Resolved: 2025-04-16

## 2025-07-31 ENCOUNTER — EXTERNAL CHRONIC CARE MANAGEMENT (OUTPATIENT)
Dept: PRIMARY CARE CLINIC | Facility: CLINIC | Age: 85
End: 2025-07-31
Payer: MEDICARE

## 2025-07-31 PROCEDURE — 99490 CHRNC CARE MGMT STAFF 1ST 20: CPT | Mod: S$PBB,,, | Performed by: FAMILY MEDICINE

## 2025-07-31 PROCEDURE — 99439 CHRNC CARE MGMT STAF EA ADDL: CPT | Mod: PBBFAC,PO | Performed by: FAMILY MEDICINE

## 2025-07-31 PROCEDURE — 99439 CHRNC CARE MGMT STAF EA ADDL: CPT | Mod: S$PBB,,, | Performed by: FAMILY MEDICINE

## 2025-07-31 PROCEDURE — 99490 CHRNC CARE MGMT STAFF 1ST 20: CPT | Mod: PBBFAC,PO | Performed by: FAMILY MEDICINE
